# Patient Record
Sex: MALE | Race: AMERICAN INDIAN OR ALASKA NATIVE
[De-identification: names, ages, dates, MRNs, and addresses within clinical notes are randomized per-mention and may not be internally consistent; named-entity substitution may affect disease eponyms.]

---

## 2017-03-01 ENCOUNTER — HOSPITAL ENCOUNTER (INPATIENT)
Dept: HOSPITAL 5 - ED | Age: 57
LOS: 9 days | Discharge: HOME HEALTH | DRG: 286 | End: 2017-03-10
Attending: HOSPITALIST | Admitting: INTERNAL MEDICINE
Payer: MEDICARE

## 2017-03-01 DIAGNOSIS — Z96.653: ICD-10-CM

## 2017-03-01 DIAGNOSIS — R06.6: ICD-10-CM

## 2017-03-01 DIAGNOSIS — I47.2: Primary | ICD-10-CM

## 2017-03-01 DIAGNOSIS — I48.4: ICD-10-CM

## 2017-03-01 DIAGNOSIS — Z86.39: ICD-10-CM

## 2017-03-01 DIAGNOSIS — I25.10: ICD-10-CM

## 2017-03-01 DIAGNOSIS — I48.92: ICD-10-CM

## 2017-03-01 DIAGNOSIS — F17.210: ICD-10-CM

## 2017-03-01 DIAGNOSIS — R13.10: ICD-10-CM

## 2017-03-01 DIAGNOSIS — I42.0: ICD-10-CM

## 2017-03-01 DIAGNOSIS — I42.9: ICD-10-CM

## 2017-03-01 DIAGNOSIS — K21.9: ICD-10-CM

## 2017-03-01 DIAGNOSIS — G40.909: ICD-10-CM

## 2017-03-01 DIAGNOSIS — I10: ICD-10-CM

## 2017-03-01 DIAGNOSIS — I48.0: ICD-10-CM

## 2017-03-01 DIAGNOSIS — J45.909: ICD-10-CM

## 2017-03-01 DIAGNOSIS — I34.0: ICD-10-CM

## 2017-03-01 DIAGNOSIS — R57.0: ICD-10-CM

## 2017-03-01 LAB
ALBUMIN SERPL-MCNC: 4.2 G/DL (ref 3.9–5)
ALBUMIN/GLOB SERPL: 1.4 %
ALP SERPL-CCNC: 93 UNITS/L (ref 35–129)
ALT SERPL-CCNC: 22 UNITS/L (ref 7–56)
ANION GAP SERPL CALC-SCNC: 26 MMOL/L
APTT BLD: 28.9 SEC. (ref 24.2–36.6)
BASOPHILS NFR BLD AUTO: 0.7 % (ref 0–1.8)
BILIRUB SERPL-MCNC: 0.9 MG/DL (ref 0.1–1.2)
BUN SERPL-MCNC: 14 MG/DL (ref 9–20)
BUN/CREAT SERPL: 11.66 %
CALCIUM SERPL-MCNC: 8.6 MG/DL (ref 8.4–10.2)
CHLORIDE SERPL-SCNC: 100.1 MMOL/L (ref 98–107)
CK SERPL-CCNC: 393 UNITS/L (ref 55–170)
CO2 SERPL-SCNC: 21 MMOL/L (ref 22–30)
EOSINOPHIL NFR BLD AUTO: 0.4 % (ref 0–4.3)
GLUCOSE SERPL-MCNC: 92 MG/DL (ref 75–100)
HCT VFR BLD CALC: 38.2 % (ref 35.5–45.6)
HGB BLD-MCNC: 12.5 GM/DL (ref 11.8–15.2)
INR PPP: 1.04 (ref 0.87–1.13)
MCH RBC QN AUTO: 29 PG (ref 28–32)
MCHC RBC AUTO-ENTMCNC: 33 % (ref 32–34)
MCV RBC AUTO: 88 FL (ref 84–94)
PLATELET # BLD: 211 K/MM3 (ref 140–440)
POTASSIUM SERPL-SCNC: 4.1 MMOL/L (ref 3.6–5)
PROT SERPL-MCNC: 7.2 G/DL (ref 6.3–8.2)
RBC # BLD AUTO: 4.35 M/MM3 (ref 3.65–5.03)
SODIUM SERPL-SCNC: 143 MMOL/L (ref 137–145)
WBC # BLD AUTO: 7.4 K/MM3 (ref 4.5–11)

## 2017-03-01 PROCEDURE — 85027 COMPLETE CBC AUTOMATED: CPT

## 2017-03-01 PROCEDURE — 71010: CPT

## 2017-03-01 PROCEDURE — 93005 ELECTROCARDIOGRAM TRACING: CPT

## 2017-03-01 PROCEDURE — 96361 HYDRATE IV INFUSION ADD-ON: CPT

## 2017-03-01 PROCEDURE — 84484 ASSAY OF TROPONIN QUANT: CPT

## 2017-03-01 PROCEDURE — 83735 ASSAY OF MAGNESIUM: CPT

## 2017-03-01 PROCEDURE — 82553 CREATINE MB FRACTION: CPT

## 2017-03-01 PROCEDURE — C1892 INTRO/SHEATH,FIXED,PEEL-AWAY: HCPCS

## 2017-03-01 PROCEDURE — 36415 COLL VENOUS BLD VENIPUNCTURE: CPT

## 2017-03-01 PROCEDURE — 83880 ASSAY OF NATRIURETIC PEPTIDE: CPT

## 2017-03-01 PROCEDURE — 85025 COMPLETE CBC W/AUTO DIFF WBC: CPT

## 2017-03-01 PROCEDURE — 93306 TTE W/DOPPLER COMPLETE: CPT

## 2017-03-01 PROCEDURE — 80048 BASIC METABOLIC PNL TOTAL CA: CPT

## 2017-03-01 PROCEDURE — 93458 L HRT ARTERY/VENTRICLE ANGIO: CPT

## 2017-03-01 PROCEDURE — 93880 EXTRACRANIAL BILAT STUDY: CPT

## 2017-03-01 PROCEDURE — 88305 TISSUE EXAM BY PATHOLOGIST: CPT

## 2017-03-01 PROCEDURE — C1895 LEAD, AICD, ENDO DUAL COIL: HCPCS

## 2017-03-01 PROCEDURE — 33249 INSJ/RPLCMT DEFIB W/LEAD(S): CPT

## 2017-03-01 PROCEDURE — 85730 THROMBOPLASTIN TIME PARTIAL: CPT

## 2017-03-01 PROCEDURE — C1726 CATH, BAL DIL, NON-VASCULAR: HCPCS

## 2017-03-01 PROCEDURE — C1722 AICD, SINGLE CHAMBER: HCPCS

## 2017-03-01 PROCEDURE — 93010 ELECTROCARDIOGRAM REPORT: CPT

## 2017-03-01 PROCEDURE — C1894 INTRO/SHEATH, NON-LASER: HCPCS

## 2017-03-01 PROCEDURE — 83036 HEMOGLOBIN GLYCOSYLATED A1C: CPT

## 2017-03-01 PROCEDURE — 80053 COMPREHEN METABOLIC PANEL: CPT

## 2017-03-01 PROCEDURE — 85610 PROTHROMBIN TIME: CPT

## 2017-03-01 PROCEDURE — 96374 THER/PROPH/DIAG INJ IV PUSH: CPT

## 2017-03-01 PROCEDURE — 96375 TX/PRO/DX INJ NEW DRUG ADDON: CPT

## 2017-03-01 PROCEDURE — 82550 ASSAY OF CK (CPK): CPT

## 2017-03-01 RX ADMIN — APIXABAN SCH MG: 5 TABLET, FILM COATED ORAL at 23:00

## 2017-03-01 NOTE — ADMIT CRITERIA FORM
Admission Criteria Documentation: 





                            CARDIOLOGY GRG





Clinical Indications for Admission to Inpatient Care


    


                                                                               (

Place 'X' for any and all applicable criteria): 





Hospital admission is needed for appropriate care of the patient because of ANY 

ONE of the following (1): 





[ ] I.    Hemodynamic instability as indicated by ALL of the following (1)(2)(3)

(4)(5)


         [ ]a)   Vital signs or other findings not as expected for chronic 

patient condition or baseline


         [ ]b)   Instability indicated by ANY ONE of the following:


                  [ ]i)     Hypotension


                  [ ]ii)    Symptomatic Tachycardia unresponsive to treatment (

e.g., analgesia, fluids, sedation as indicated)


                  [ ]iii)   Inadequate perfusion indicated by ANY ONE of the 

following:


                            [ ] 1)   Lactic acidosis (> 2 mmol/L)


                            [ ] 2)   New abnormal capillary refill (> 3 seconds)


                            [ ] 3)   Reduced urine output


                            [ ] 4)   New altered mental status


                  [ ]iv)   Orthostatic vital sign changes unresponsive to 

treatment (e.g., fluids)              


                  [ ]v)    IV inotropic or vasopressor medication required to 

maintain adequate blood pressure or perfusion


[ ] II.  Severe heart failure as indicated by ANY ONE of the following(17)(18)


         [ ]a)  Respiratory distress        


         [ ]b)  Hypotension


         [ ]c)  Anasarca (refractory to outpatient therapy) 


         [ ]d)  Cardiac arrhythmias of immediate concern 


         [ ]e)  Myocardial ischemia


[ ] III. Cardiac arrhythmias or findings of immediate concern indicated by ANY 

ONE of the following (19)(20):


         [ ] a)  Heart rhythms that are inherently dangerous or unstable 

indicated by ANY ONE of the following (21)(22)(23):


                 [ ] i)    Resuscitated ventricular fibrillation or cardiac 

arrest


                 [ ] ii)   Ventricular escape rhythm


                 [ ] iii)  Sustained ventricular tachycardia (30 seconds or 

more of ventricular rhythm at greater than 100 beats per minute)


                 [ ] iv)  Nonsustained ventricular tachycardia and ANY ONE of 

the following:


                          [ ] 1)    Suspected cardiac ischemia as cause or 

consequence of ventricular tachycardia    


                          [ ] 2)    In setting of acute myocarditis         


         [ ] b)  Unstable cardiac conduction defects indicated by ANY ONE of 

the following(23)(24)(25)


                  [ ] i)    Type II second-degree atrioventricular block    


                  [ ]ii)    Third-degree atrioventricular block                


                  [ ]iii)    New-onset left bundle branch block with suspected 

myocardial ischemia


         [ ]c)   Any heart rhythm and ANY ONE of the following (21)(22)(26)(27) 

(28)


                  [ ] i)    Continuous long-term ECG monitoring needed (e.g., 

initiation of drug requiring monitoring for more than 24 hours)


                  [ ] ii)   Patient has automatic implanted cardioverter 

defibrillator that is repeatedly firing, malfunctioning, or in need of 

immediate 


                            adjustment of settings beyond the scope of 

ambulatory or observation care


        [ ]d)    Heart rhythms of concern due to ANY ONE of the following:


                  [ ] i)    Hypotension


                  [ ] ii)    Respiratory distress


                  [ ] iii)   Association with other significant symptoms (e.g., 

bradycardia with syncope or ongoing dizziness, supraventricular 


                            tachycardia with chest pain (14)(15)(17)


[ ] IV.   Monitoring for cardiac contusion beyond the scope of observation care 

needed [A](30)(31)(32)


[ ] V.    Surgical or device complication (e.g., valve replacement complication

, pacemaker dysfunction) (35)(41)(44)(45)(46)


[ ] VI.   Inpatient palliative care needed. [B](49) Also use Inpatient 

Palliative Care Criteria


[ ] VII.  Nonbacterial thrombotic (marantic) endocarditis (36)(43)(47)(48)


[ X] VIII. Cardiology condition, symptom, or finding for which emergency and 

observation care has failed or are not considered appropriate.


[ ] IX.   Acute valvular disease requiring inpatient as indicated by ANY ONE of 

the following (41)


          [ ]a)   Acute valvular regurgitation (42)


          [ ]b)   Noninfectious valvulitis (43)


          [ ]c)   Obstructive valve thrombosis 


          [ ]d)   Paravalvular leak


          [ ]e)   Other significant valvular disorder remaining after emergency 

or observation level of care (as appropriate)


[ ]X.    Pericardial disease requiring inpatient treatment as indicated by ANY 

ONE of the following (33)(34)(35)(36)(37)           


          [ ]a)   Suspected tamponade (38)(39)(40)


          [ ]b)   Hemopericardium


          [ ]c)   Other significant pericardial disorder remaining after 

emergency or observation level of care (as appropriate)


[ ] XI.  Cardiac ischemia beyond scope of emergency and observation care. 


[ ] XII. Hypertension requiring inpatient treatment as indicated by ANY ONE of 

the following (6)(7)(8)


         [ ]a)    SBP greater than 220 mm Hg or DBP greater than 120 mmHg 

despite treatment


         [ ]b)    SBP greater than 140 mm Hg or DBP greater than 100 mm Hg with 

evidence of acute end organ damage as indicated


                   by ANY ONE of the following


                   [ ] i)      Altered mental status


                   [ ] ii)     Acute renal failure as indicated by new onset of 

ANY ONE of the following (9)(10)(11)(12)(13)


                               [ ]1)  3-fold rise in serum creatinine from 

baseline


                               [ ]2)  Serum creatinine greater than 4 mg/dL (

354 micromoles/L) with acute rise greater than 0.5 mg/dL (44.2 micromoles/L)


                               [ ]3)  Reduction of more than 75% in estimated 

glomerular filtration rate from baseline 


                               [ ]4)  Estimated glomerular filtration rate less 

than 35 mL/min/1.73m2 (0.59 mL/sec/1.73m2) in child up to 18 years of age


                               [ ]5)  Cessation of urine output indicated by 

ALL of the following


                                       [ ]A.   Adequate volume status


                                       [ ]B.   Inadequate urine output as 

indicated by ANY ONE of the following       


                                                [ ]a.   Urine output less than 

0.3 mL/kg/hr for 24 hours


                                                [ ]b.   Anuria (urine output 

less than 0.1 mL/kg/hr) for 12 hours 


                  [ ] iii)      Aortic dissection


                  [ ] iv)      Myocardial Ischemia


                  [ ] v)       Left ventricular heart failure 


                  [ ]vi)       Retinal Hemorrhage


                  [ ]vii)      Other significant finding


          [ ]c)   Hypertension in child requiring inpatient treatment as 

indicated by ALL of the following(14)(15)(16)


                  [ ] i)        Outpatient treatment not effective, not 

available, or not appropriate               


                  [ ]ii)        SBP or DBP greater than 95th percentile for age


                  [ ]iii)       Evidence of acute end organ damage as indicated 

by ANY ONE of the following 


                               [ ]1)     Altered mental status


                               [ ]2)    Acute renal failure as indicated by new 

onset of ANY ONE of the following(9)(10)(11)(12)(13)


                                         [ ]A.    3-fold rise in serum 

creatinine from baseline


                                         [ ]B.    Serum creatinine greater than 

4 mg/dL (354 micromoles/L) with acute rise greater than 0.5 mg/dL (44.2 

micromoles/L)


                                         [ ]C.    Reduction of more than 75% in 

estimated glomerular filtration rate from baseline


                                         [ ]D.    Estimated glomerular 

filtration rate less than 35 mL/min/1.73m2 (0.59 mL/sec/1.73m2) in child up to 

18 years of age 


                                         [ ]E.    Cessation of urine output 

indicated by ALL of the following 


                                                   [ ]a.  Adequate volume status


                                                   [ ]b.  Inadequate urine 

output as indicated by ANY ONE of the following 


                                                           [ ]i)    Urine 

output less than 0.3 mL/kg/hr for 24 hours


                                                           [ ]ii)   Anuria (

urine output less than 0.1 mL/kg/hr) for 12 hours                              

  


                               [ ]3)  Severe headache


                               [ ]4)  Visual disturbance 


                               [ ]5)  Retinal hemorrhage


                               [ ]6)  Other significant finding


[ ]XIII.   Complications of transplanted heart indicated by ANY ONE of the 

following(61):


           [ ]a)  Acute graft rejection requiring inpatient management (eg, 

intravenous immunosuppression)(62)(63)


           [ ]b)  Acute graft heart failure indicated by ANY ONE of the 

following(64):


                   [ ]i)   Hemodynamic instability


                   [ ]ii)  Cardiac arrhythmias of immediate concern


                   [ ]iii) Pulmonary edema that is very severe (eg, mechanical 

ventilation needed,


                          imminent or likely, need for 100% oxygen to keep 

oxygen saturation above 90%)


                   [ ]iv) Pulmonary edema that is persistent as indicated by ALL

 of the following:


                          [ ]1)   New need for oxygen therapy to keep oxygen 

saturation above 90% (or increased FiO2 need from baseline)


                          [ ]2)   Has not improved sufficiently with emergency 

department or observation


                                   care IV diuretics or other heart failure 

treatments[E]


                   [ ]v)   Altered mental status that is severe or persistent


                   [ ]vi)   Increased creatinine (new on laboratory test) with 

reduction of more than 50% in


                            estimated glomerular filtration rate from baseline


                   [ ]vii)  Progressively (ongoing) rising creatinine (known 

from past laboratory test) with


                            reduction of more than 25% in estimated glomerular 

filtration rate from baseline


                   [ ]viii) Acute renal failure


                   [ ]ix)  Acute peripheral ischemia (eg, examination shows 

pulseless, cool, mottled, or cyanotic extremity)


                   [ ]x)   Pulmonary artery catheter monitoring needed


                   [ ]xi)  Other sign or symptom of heart failure requiring 

inpatient treatment (ie, too severe or


                            not responsive to outpatient and observation care 

treatment)


        [ ]c)    Infection requiring inpatient management (eg, Hemodynamic 

instability, need for intravenous antimicrobial treatment)(66)(67)(68)(69)(70)


        [ ]d)   Cardiac allograft vasculopathy requiring inpatient management (

eg evidence of cardiac ischemia)(71)


        [ ]e)   Other complication of transplanted heart (eg, stroke, severe 

pulmonary hypertension, severe valvular 


                 dysfunction) requiring inpatient management(72)








The original [facility] [employer] content created by [facility] [employer] has been revised. 


The portions of the content which have been revised are identified through the 

use of italic text or in bold, and [facility] 


has neither reviewed nor approved the modified material. All other unmodified 

content is copyright  [facility] [employer].





Please see references footnoted in the original [facility] [employer] edition 

2016





Admission Criteria Met: Yes

## 2017-03-01 NOTE — EMERGENCY DEPARTMENT REPORT
HPI





- General


Chief Complaint: Chest Pain


Time Seen by Provider: 03/01/17 17:49





- HPI


HPI: 





The patient is a 56-year-old male with a history of SVT, presents for 

evaluation of chest pain.  The patient reports similar onset of chest pain past 

one hour prior to arrival.  He states that his chest pain and midsternal in 

location, throbbing in quality, 10/10 in severity, constant since onset.  He 

shares a experienced an episode of syncope followed by palpitations and 

diaphoresis just prior to onset of chest pain.  The patient denies fever, cough

, dyspnea, hemoptysis, unilateral leg swelling, recent immobilization, history 

of DVT or PE, recent cancer.





ED Past Medical Hx





- Past Medical History


Previous Medical History?: Yes


Hx Hypertension: Yes


Hx Congestive Heart Failure: No


Hx Diabetes: No


Hx Asthma: No


Hx COPD: No





- Surgical History


Past Surgical History?: Yes


Additional Surgical History: JAW, KNEE, HAND





- Social History


Smoking Status: Current Every Day Smoker


Substance Use Type: Alcohol





- Medications


Home Medications: 


 Home Medications











 Medication  Instructions  Recorded  Confirmed  Last Taken  Type


 


Ibuprofen [Motrin 800 MG tab] 800 mg PO Q8HR PRN #30 tablet 05/12/16 08/14/16 2 

Days Ago Rx


 


Metoprolol [Lopressor] 0 mg PO BID 08/15/16 08/15/16 Unknown History


 


Apixaban [Eliquis] 5 mg PO Q12HR #14 tablet 08/19/16  Unknown Rx


 


Lisinopril [Zestril TAB] 5 mg PO QDAY #30 tablet 08/19/16  Unknown Rx


 


Metoprolol [Lopressor TAB] 75 mg PO Q8H #30 tablet 08/19/16  Unknown Rx


 


Pantoprazole [Protonix TAB] 0 mg PO QDAY #30 tablet. 08/19/16  Unknown Rx














ED Review of Systems


ROS: 


Stated complaint: CHEST PAIN


Other details as noted in HPI








Constitutional: denies: fever


ENT: denies: throat or neck pain


Respiratory: denies: cough, shortness of breath


Cardiovascular: reports chest pain


Endocrine: denies unexplained weight loss or gain


Gastrointestinal: denies: abdominal pain, nausea


Genitourinary: denies: dysuria


Musculoskeletal: denies: leg swelling


Skin: denies: rash


Neurological: denies: headache


Hematological/Lymphatic: denies: easy bleeding or easy bruising  


Psych: denies sadness or hopelessness








Physical Exam





- Physical Exam


Vital Signs: 


 Vital Signs











  03/01/17 03/01/17 03/01/17





  17:30 17:36 17:37


 


Pulse Rate 216 H 217 H 


 


Respiratory 22 24 17





Rate   


 


Blood Pressure   


 


O2 Sat by Pulse 100 100 100





Oximetry   














  03/01/17 03/01/17





  17:41 17:51


 


Pulse Rate 91 H 92 H


 


Respiratory 14 





Rate  


 


Blood Pressure 130/94 


 


O2 Sat by Pulse 100 





Oximetry  











Physical Exam: 








General: well-nourished, well-developed, no acute distress


Head: Normocephalic, atraumatic


Eyes: normal sclera


ENT: Mucous membranes are pale and dry


Neck: trachea midline, neck supple, No neck stiffness, no cervical adenopathy


Respiratory: Breath sounds equal bilaterally, no wheezing, rales, or rhonchi


Cardio: S1 and S2 present, no murmurs, rubs, gallops, capillary refill is 

delayed 


Abdomen: Normoactive bowel sounds, soft abdomen, no rigidity, no guarding or 

rebound tenderness


Musc: No pitting edema


Skin: patient diaphoretic


Neuro: Alert and oriented 3, no facial drooping, normal speech


Psych: Normal affect








ED Course


 Vital Signs











  03/01/17 03/01/17 03/01/17





  17:30 17:36 17:37


 


Pulse Rate 216 H 217 H 


 


Respiratory 22 24 17





Rate   


 


Blood Pressure   


 


O2 Sat by Pulse 100 100 100





Oximetry   














  03/01/17 03/01/17





  17:41 17:51


 


Pulse Rate 91 H 92 H


 


Respiratory 14 





Rate  


 


Blood Pressure 130/94 


 


O2 Sat by Pulse 100 





Oximetry  














ED Medical Decision Making





- Lab Data


Result diagrams: 


 03/01/17 17:30





 03/01/17 17:30





- Medical Decision Making





The patient was seen and examined by myself.  The patient is placed on a 

cardiac monitor and continuous pulse ox. On initial evaluation, the patient was 

found to be in no distress. Evaluation orders were placed.  Initial EKG 

exhibits wide complex QRS tachycardia, HR in 200s, consistent with polymorphic 

ventricular tachycardia.  The patient's blood pressure is unable to be obtained 

and the patient meets criteria for unstable ventricular tachycardia.  The 

patient is given IV Versed. The patient is cardioverted with 200 J.  A repeat 

EKG is obtained and now exhibits normal sinus rhythm.  Chest x-ray is 

unremarkable.  Lab results are grossly unrevealing. The on-call hospitalist 

service was contacted.  They agreed to admit the patient for further treatment 

and close monitoring.  The ED admit order was placed.  The patient was admitted 

in guarded condition.





Critical care attestation.: 


If time is entered above; I have spent that time in minutes in the direct care 

of this critically ill patient, excluding procedure time.








ED Disposition


Clinical Impression: 


 Ventricular tachycardia, monomorphic, Cardiogenic shock, Chest pain in adult





Disposition: OP ADMITTED AS IP TO THIS HOSP


Is pt being admited?: Yes


Does the pt Need Aspirin: Yes


Condition: Serious


Instructions:  Chest Pain (ED)


Time of Disposition: 17:57

## 2017-03-02 LAB
ALBUMIN SERPL-MCNC: 3.8 G/DL (ref 3.9–5)
ALBUMIN/GLOB SERPL: 1.7 %
ALP SERPL-CCNC: 78 UNITS/L (ref 35–129)
ALT SERPL-CCNC: 18 UNITS/L (ref 7–56)
ANION GAP SERPL CALC-SCNC: 20 MMOL/L
BASOPHILS NFR BLD AUTO: 0.9 % (ref 0–1.8)
BILIRUB SERPL-MCNC: 0.8 MG/DL (ref 0.1–1.2)
BUN SERPL-MCNC: 14 MG/DL (ref 9–20)
BUN/CREAT SERPL: 14 %
CALCIUM SERPL-MCNC: 8 MG/DL (ref 8.4–10.2)
CHLORIDE SERPL-SCNC: 102.9 MMOL/L (ref 98–107)
CK SERPL-CCNC: 343 UNITS/L (ref 55–170)
CO2 SERPL-SCNC: 26 MMOL/L (ref 22–30)
EOSINOPHIL NFR BLD AUTO: 1 % (ref 0–4.3)
GLUCOSE SERPL-MCNC: 88 MG/DL (ref 75–100)
HCT VFR BLD CALC: 34.7 % (ref 35.5–45.6)
HGB BLD-MCNC: 11.2 GM/DL (ref 11.8–15.2)
MCH RBC QN AUTO: 29 PG (ref 28–32)
MCHC RBC AUTO-ENTMCNC: 32 % (ref 32–34)
MCV RBC AUTO: 90 FL (ref 84–94)
PLATELET # BLD: 156 K/MM3 (ref 140–440)
POTASSIUM SERPL-SCNC: 4.5 MMOL/L (ref 3.6–5)
PROT SERPL-MCNC: 6.1 G/DL (ref 6.3–8.2)
RBC # BLD AUTO: 3.83 M/MM3 (ref 3.65–5.03)
SODIUM SERPL-SCNC: 144 MMOL/L (ref 137–145)
WBC # BLD AUTO: 4.8 K/MM3 (ref 4.5–11)

## 2017-03-02 PROCEDURE — 5A2204Z RESTORATION OF CARDIAC RHYTHM, SINGLE: ICD-10-PCS | Performed by: INTERNAL MEDICINE

## 2017-03-02 RX ADMIN — METOPROLOL TARTRATE SCH MG: 50 TABLET, FILM COATED ORAL at 00:00

## 2017-03-02 RX ADMIN — ACETAMINOPHEN PRN MG: 325 TABLET ORAL at 00:45

## 2017-03-02 RX ADMIN — ZOLPIDEM TARTRATE PRN MG: 5 TABLET ORAL at 21:04

## 2017-03-02 RX ADMIN — DEXTROSE AND SODIUM CHLORIDE SCH MLS/HR: 5; .9 INJECTION, SOLUTION INTRAVENOUS at 17:18

## 2017-03-02 RX ADMIN — HYDROMORPHONE HYDROCHLORIDE PRN MG: 1 INJECTION, SOLUTION INTRAMUSCULAR; INTRAVENOUS; SUBCUTANEOUS at 21:04

## 2017-03-02 RX ADMIN — OXYCODONE AND ACETAMINOPHEN PRN TAB: 5; 325 TABLET ORAL at 23:35

## 2017-03-02 RX ADMIN — METOPROLOL TARTRATE SCH MG: 50 TABLET, FILM COATED ORAL at 23:35

## 2017-03-02 RX ADMIN — PANTOPRAZOLE SODIUM SCH MG: 40 TABLET, DELAYED RELEASE ORAL at 10:12

## 2017-03-02 RX ADMIN — DEXTROSE AND SODIUM CHLORIDE SCH MLS/HR: 5; .9 INJECTION, SOLUTION INTRAVENOUS at 02:03

## 2017-03-02 RX ADMIN — IBUPROFEN PRN MG: 800 TABLET, FILM COATED ORAL at 17:17

## 2017-03-02 RX ADMIN — OXYCODONE AND ACETAMINOPHEN PRN TAB: 5; 325 TABLET ORAL at 08:12

## 2017-03-02 RX ADMIN — LISINOPRIL SCH MG: 5 TABLET ORAL at 10:12

## 2017-03-02 RX ADMIN — APIXABAN SCH MG: 5 TABLET, FILM COATED ORAL at 10:12

## 2017-03-02 RX ADMIN — METOPROLOL TARTRATE SCH MG: 50 TABLET, FILM COATED ORAL at 08:12

## 2017-03-02 RX ADMIN — AMIODARONE HYDROCHLORIDE SCH MG: 200 TABLET ORAL at 21:03

## 2017-03-02 RX ADMIN — METOPROLOL TARTRATE SCH MG: 50 TABLET, FILM COATED ORAL at 17:17

## 2017-03-02 RX ADMIN — ZOLPIDEM TARTRATE PRN MG: 5 TABLET ORAL at 01:30

## 2017-03-02 RX ADMIN — ONDANSETRON PRN MG: 2 INJECTION INTRAMUSCULAR; INTRAVENOUS at 21:04

## 2017-03-02 NOTE — PROGRESS NOTE
Assessment and Plan


Assessment and plan: 


1. Wide complex tachycardia- PAF with dilated CMP -cardioverted with 200J;  

cardioversion; cotn eliquis; no ischemia by MPI 8/2016; EF 15-20% on echo 8/2016

; for ECHO; cardiology consult appreciated; for Lt heart cath in the a.m; EP 

consult with Dr. Kiara arrieta 


2. Hypertension- cotn metoprolol


3. DVT prophylaxis- on eliquis





History


Interval history: 


F/u wide complex tachycardia' syncope chest pain


PAtient seen at the bedside; was cardioverted with 200J in the ER; c/p chest 

pain   





Hospitalist Physical





- Constitutional


Vitals: 


 











Temp Pulse Resp BP Pulse Ox


 


 98.1 F   76   18   147/87   95 


 


 03/02/17 09:45  03/02/17 09:45  03/02/17 09:45  03/02/17 09:45  03/02/17 09:45











General appearance: Present: no acute distress, well-nourished





- EENT


Eyes: Present: PERRL.  Absent: scleral icterus, conjunctival injection


ENT: hearing intact, clear oral mucosa





- Neck


Neck: Present: supple, normal ROM.  Absent: enlarged thyroid, masses or JVD





- Respiratory


Respiratory effort: normal


Respiratory: negative: diminished, rales, rhonchi, wheezing





- Cardiovascular


Rhythm: regular


Heart Sounds: Present: S1 & S2.  Absent: gallop





- Extremities


Extremities: no ischemia, pulses intact, pulses symmetrical, No edema


Peripheral Pulses: within normal limits





- Abdominal


General gastrointestinal: soft, non-tender, non-distended





- Integumentary


Integumentary: Present: clear





- Psychiatric


Psychiatric: appropriate mood/affect, intact judgment & insight





- Neurologic


Neurologic: CNII-XII intact





Results





- Labs


CBC & Chem 7: 


 03/02/17 05:21





 03/02/17 05:21


Labs: 


 Laboratory Last Values











WBC  4.8 K/mm3 (4.5-11.0)   03/02/17  05:21    


 


RBC  3.83 M/mm3 (3.65-5.03)   03/02/17  05:21    


 


Hgb  11.2 gm/dl (11.8-15.2)  L  03/02/17  05:21    


 


Hct  34.7 % (35.5-45.6)  L  03/02/17  05:21    


 


MCV  90 fl (84-94)   03/02/17  05:21    


 


MCH  29 pg (28-32)   03/02/17  05:21    


 


MCHC  32 % (32-34)   03/02/17  05:21    


 


RDW  16.6 % (13.2-15.2)  H  03/02/17  05:21    


 


Plt Count  156 K/mm3 (140-440)   03/02/17  05:21    


 


Lymph % (Auto)  22.8 % (13.4-35.0)   03/02/17  05:21    


 


Mono % (Auto)  8.4 % (0.0-7.3)  H  03/02/17  05:21    


 


Eos % (Auto)  1.0 % (0.0-4.3)   03/02/17  05:21    


 


Baso % (Auto)  0.9 % (0.0-1.8)   03/02/17  05:21    


 


Lymph #  1.1 K/mm3 (1.2-5.4)  L  03/02/17  05:21    


 


Mono #  0.4 K/mm3 (0.0-0.8)   03/02/17  05:21    


 


Eos #  0.0 K/mm3 (0.0-0.4)   03/02/17  05:21    


 


Baso #  0.0 K/mm3 (0.0-0.1)   03/02/17  05:21    


 


Seg Neutrophils %  66.9 % (40.0-70.0)   03/02/17  05:21    


 


Seg Neutrophils #  3.2 K/mm3 (1.8-7.7)   03/02/17  05:21    


 


PT  13.5 Sec. (12.2-14.9)   03/01/17  17:30    


 


INR  1.04  (0.87-1.13)   03/01/17  17:30    


 


APTT  28.9 Sec. (24.2-36.6)   03/01/17  17:30    


 


Sodium  144 mmol/L (137-145)   03/02/17  05:21    


 


Potassium  4.5 mmol/L (3.6-5.0)   03/02/17  05:21    


 


Chloride  102.9 mmol/L ()   03/02/17  05:21    


 


Carbon Dioxide  26 mmol/L (22-30)   03/02/17  05:21    


 


Anion Gap  20 mmol/L  03/02/17  05:21    


 


BUN  14 mg/dL (9-20)   03/02/17  05:21    


 


Creatinine  1.0 mg/dL (0.8-1.5)   03/02/17  05:21    


 


Estimated GFR  > 60 ml/min  03/02/17  05:21    


 


BUN/Creatinine Ratio  14.00 %  03/02/17  05:21    


 


Glucose  88 mg/dL ()   03/02/17  05:21    


 


Hemoglobin A1c  5.6 % (4-6)   03/01/17  22:35    


 


Calcium  8.0 mg/dL (8.4-10.2)  L  03/02/17  05:21    


 


Magnesium  1.7 mg/dL (1.7-2.3)   03/01/17  17:30    


 


Total Bilirubin  0.8 mg/dL (0.1-1.2)   03/02/17  05:21    


 


AST  31 units/L (5-40)   03/02/17  05:21    


 


ALT  18 units/L (7-56)   03/02/17  05:21    


 


Alkaline Phosphatase  78 units/L ()   03/02/17  05:21    


 


Total Creatine Kinase  343 units/L ()  H  03/02/17  05:21    


 


CK-MB (CK-2)  4.7 ng/mL (0.0-4.0)  H  03/02/17  05:21    


 


CK-MB (CK-2) Rel Index  1.3  (0-4)   03/02/17  05:21    


 


Troponin T  < 0.010 ng/mL (0.00-0.029)   03/02/17  05:21    


 


NT-Pro-B Natriuret Pep  308.9 pg/mL (0-900)   03/01/17  17:30    


 


Total Protein  6.1 g/dL (6.3-8.2)  L  03/02/17  05:21    


 


Albumin  3.8 g/dL (3.9-5)  L  03/02/17  05:21    


 


Albumin/Globulin Ratio  1.7 %  03/02/17  05:21

## 2017-03-02 NOTE — CONSULTATION
<ANNA GOMEZ - Last Filed: 03/02/17 14:17>





History of Present Illness


Consult date: 03/02/17


Consult reason: other (Ventricular tachycardia)


History of present illness: 





This is a 56yr old male who reports helping a neighbor when he developed chest 

pain, palpitations, diaphoresis and dizziness. EMS was called and noted a wide 

complex tachycardia on initial ECG. He was given 12mg of adenosine and brought 

to this hospital. Upon arrival patient remained in a wide complex tachycardia 

with heart rate in the 200s. Patient was considered unstable and was 

cardioverted with 200J. An ECG post cardioversion shows a normal sinus rhythm.





Patient reports a cardiac history of dilated cardiomyopathy and paroxysmal 

atrial flutter. He reports taking eliquis for anticoagulation. Patient unable 

to articulate if he has a history of coronary disease but does recall 

undergoing a cardiac cath several years ago at a hospital in [place]. His latest 

cardiac workup was done at this hospital within the last year. He had a 

persantine thallium stress test that demonstrated a normal myocardial 

perfusion. No ischemia. An echocardiogram reported an ejection fraction 15-20%. 

Patient reports he is followed by a cardiologist [facility]. 





Medications and Allergies


 Allergies











Allergy/AdvReac Type Severity Reaction Status Date / Time


 


No Known Allergies Allergy   Unverified 05/12/16 18:30











 Home Medications











 Medication  Instructions  Recorded  Confirmed  Last Taken  Type


 


Ibuprofen [Motrin 800 MG tab] 800 mg PO Q8HR PRN #30 tablet 05/12/16 08/14/16 2 

Days Ago Rx


 


Metoprolol [Lopressor] 0 mg PO BID 08/15/16 08/15/16 Unknown History


 


Apixaban [Eliquis] 5 mg PO Q12HR #14 tablet 08/19/16  Unknown Rx


 


Lisinopril [Zestril TAB] 5 mg PO QDAY #30 tablet 08/19/16  Unknown Rx


 


Metoprolol [Lopressor TAB] 75 mg PO Q8H #30 tablet 08/19/16  Unknown Rx


 


Pantoprazole [Protonix TAB] 0 mg PO QDAY #30 tablet.dr 08/19/16  Unknown Rx











Active Meds: 


Active Medications





Acetaminophen (Tylenol)  650 mg PO Q4H PRN


   PRN Reason: Pain MILD(1-3)/Fever >100.5/HA


   Last Admin: 03/02/17 00:45 Dose:  650 mg


Apixaban (Eliquis)  5 mg PO Q12HR [facility]


   Last Admin: 03/02/17 10:12 Dose:  5 mg


Bisacodyl (Dulcolax)  10 mg SD QDAY PRN


   PRN Reason: Constipation unrelieved by MOM


Enoxaparin Sodium (Lovenox)  40 mg SUB-Q QDAY [facility]


   Last Admin: 03/02/17 10:13 Dose:  40 mg


Hydromorphone HCl (Dilaudid)  0.5 mg IV Q3H PRN


   PRN Reason: Pain , Severe (7-10)


Dextrose/Sodium Chloride (D5ns)  1,000 mls @ 75 mls/hr IV AS DIRECT [facility]


   Last Admin: 03/02/17 02:03 Dose:  75 mls/hr


Ibuprofen (Motrin)  800 mg PO Q8H PRN


   PRN Reason: Pain


Lisinopril (Zestril)  5 mg PO QDAY [facility]


   Last Admin: 03/02/17 10:12 Dose:  5 mg


Magnesium Hydroxide (Milk Of Magnesia)  30 ml PO Q4H PRN


   PRN Reason: Constipation


Metoprolol Tartrate (Lopressor)  75 mg PO Q8H [facility]


   Last Admin: 03/02/17 08:12 Dose:  75 mg


Ondansetron HCl (Zofran)  4 mg IV Q8H PRN


   PRN Reason: N/V unrelieved by Reglan


Oxycodone/Acetaminophen (Percocet 5/325)  1 tab PO Q6H PRN


   PRN Reason: Pain, Moderate (4-6)


   Last Admin: 03/02/17 08:12 Dose:  1 tab


Pantoprazole Sodium (Protonix)  40 mg PO QDAY [facility]


   Last Admin: 03/02/17 10:12 Dose:  40 mg


Sodium Chloride (Sodium Chloride Flush Syringe 10 Ml)  10 ml IV PRN PRN


   PRN Reason: LINE FLUSH


Zolpidem Tartrate (Ambien)  5 mg PO QHS PRN


   PRN Reason: Insomnia


   Last Admin: 03/02/17 01:30 Dose:  5 mg











Physical Examination


 Vital Signs











Pulse Resp Pulse Ox


 


 216 H  22   100 


 


 03/01/17 17:30  03/01/17 17:30  03/01/17 17:30











General appearance: no acute distress


HEENT: Positive: PERRL


Neck: Positive: trachea midline


Cardiac: Positive: Reg Rate and Rhythm


Lungs: Positive: Decreased Breath Sounds





Results





 03/02/17 05:21





 03/02/17 05:21


 Cardiac Enzymes











  03/01/17 03/02/17 03/02/17 Range/Units





  22:35 05:21 05:21 


 


AST   31   (5-40)  units/L


 


CK-MB (CK-2)  5.6 H   4.7 H  (0.0-4.0)  ng/mL








 CBC











  03/02/17 Range/Units





  05:21 


 


WBC  4.8  (4.5-11.0)  K/mm3


 


RBC  3.83  (3.65-5.03)  M/mm3


 


Hgb  11.2 L  (11.8-15.2)  gm/dl


 


Hct  34.7 L  (35.5-45.6)  %


 


Plt Count  156  (140-440)  K/mm3


 


Lymph #  1.1 L  (1.2-5.4)  K/mm3


 


Mono #  0.4  (0.0-0.8)  K/mm3


 


Eos #  0.0  (0.0-0.4)  K/mm3


 


Baso #  0.0  (0.0-0.1)  K/mm3








 Comprehensive Metabolic Panel











  03/02/17 Range/Units





  05:21 


 


Sodium  144  (137-145)  mmol/L


 


Potassium  4.5  (3.6-5.0)  mmol/L


 


Chloride  102.9  ()  mmol/L


 


Carbon Dioxide  26  (22-30)  mmol/L


 


BUN  14  (9-20)  mg/dL


 


Creatinine  1.0  (0.8-1.5)  mg/dL


 


Glucose  88  ()  mg/dL


 


Calcium  8.0 L  (8.4-10.2)  mg/dL


 


AST  31  (5-40)  units/L


 


ALT  18  (7-56)  units/L


 


Alkaline Phosphatase  78  ()  units/L


 


Total Protein  6.1 L  (6.3-8.2)  g/dL


 


Albumin  3.8 L  (3.9-5)  g/dL














EKG interpretations





- Telemetry


EKG Rhythm: Sinus Rhythm





Assessment and Plan





Wide complex tachycardia


   terminated by 200J cardioversion


Hx of paroxysmal atrial flutter


   on eliquis for anticoagulation


Hx of dilated cardiomyopathy


   no ischemia by MPI 8/2016


   EF 15-20% on echo 8/2016


Hypertension


Hx of Seizure disorder





Plan:


Echocardiogram for LVEF assessment.


Continue metoprolol.


Will proceed with a left cardiac cath tomorrow morning. 


EP consultation with Dr Craig, for AICD evaluation.





<ADELINE CRAIG - Last Filed: 03/02/17 17:23>





Medications and Allergies


Active Meds: 


Active Medications





Acetaminophen (Tylenol)  650 mg PO Q4H PRN


   PRN Reason: Pain MILD(1-3)/Fever >100.5/HA


   Last Admin: 03/02/17 00:45 Dose:  650 mg


Apixaban (Eliquis)  5 mg PO Q12HR [facility]


   Last Admin: 03/02/17 10:12 Dose:  5 mg


Bisacodyl (Dulcolax)  10 mg SD QDAY PRN


   PRN Reason: Constipation unrelieved by MOM


Hydromorphone HCl (Dilaudid)  0.5 mg IV Q3H PRN


   PRN Reason: Pain , Severe (7-10)


Dextrose/Sodium Chloride (D5ns)  1,000 mls @ 75 mls/hr IV AS DIRECT [facility]


   Last Admin: 03/02/17 02:03 Dose:  75 mls/hr


Ibuprofen (Motrin)  800 mg PO Q8H PRN


   PRN Reason: Pain


Lisinopril (Zestril)  5 mg PO QDAY [facility]


   Last Admin: 03/02/17 10:12 Dose:  5 mg


Magnesium Hydroxide (Milk Of Magnesia)  30 ml PO Q4H PRN


   PRN Reason: Constipation


Metoprolol Tartrate (Lopressor)  75 mg PO Q8H [facility]


   Last Admin: 03/02/17 08:12 Dose:  75 mg


Ondansetron HCl (Zofran)  4 mg IV Q8H PRN


   PRN Reason: N/V unrelieved by Reglan


Oxycodone/Acetaminophen (Percocet 5/325)  1 tab PO Q6H PRN


   PRN Reason: Pain, Moderate (4-6)


   Last Admin: 03/02/17 08:12 Dose:  1 tab


Pantoprazole Sodium (Protonix)  40 mg PO QDAY [facility]


   Last Admin: 03/02/17 10:12 Dose:  40 mg


Sodium Chloride (Sodium Chloride Flush Syringe 10 Ml)  10 ml IV PRN PRN


   PRN Reason: LINE FLUSH


Zolpidem Tartrate (Ambien)  5 mg PO QHS PRN


   PRN Reason: Insomnia


   Last Admin: 03/02/17 01:30 Dose:  5 mg











Physical Examination


 Vital Signs











Pulse Resp Pulse Ox


 


 216 H  22   100 


 


 03/01/17 17:30  03/01/17 17:30  03/01/17 17:30














Results





 03/02/17 05:21





 03/02/17 05:21


 Cardiac Enzymes











  03/01/17 03/02/17 03/02/17 Range/Units





  22:35 05:21 05:21 


 


AST   31   (5-40)  units/L


 


CK-MB (CK-2)  5.6 H   4.7 H  (0.0-4.0)  ng/mL








 CBC











  03/02/17 Range/Units





  05:21 


 


WBC  4.8  (4.5-11.0)  K/mm3


 


RBC  3.83  (3.65-5.03)  M/mm3


 


Hgb  11.2 L  (11.8-15.2)  gm/dl


 


Hct  34.7 L  (35.5-45.6)  %


 


Plt Count  156  (140-440)  K/mm3


 


Lymph #  1.1 L  (1.2-5.4)  K/mm3


 


Mono #  0.4  (0.0-0.8)  K/mm3


 


Eos #  0.0  (0.0-0.4)  K/mm3


 


Baso #  0.0  (0.0-0.1)  K/mm3








 Comprehensive Metabolic Panel











  03/02/17 Range/Units





  05:21 


 


Sodium  144  (137-145)  mmol/L


 


Potassium  4.5  (3.6-5.0)  mmol/L


 


Chloride  102.9  ()  mmol/L


 


Carbon Dioxide  26  (22-30)  mmol/L


 


BUN  14  (9-20)  mg/dL


 


Creatinine  1.0  (0.8-1.5)  mg/dL


 


Glucose  88  ()  mg/dL


 


Calcium  8.0 L  (8.4-10.2)  mg/dL


 


AST  31  (5-40)  units/L


 


ALT  18  (7-56)  units/L


 


Alkaline Phosphatase  78  ()  units/L


 


Total Protein  6.1 L  (6.3-8.2)  g/dL


 


Albumin  3.8 L  (3.9-5)  g/dL














Assessment and Plan





Cardiology/Electrophysiology Service





Pt examined and evaluated and chart reviewed.  Agree with documentation by Ms. Gomez.





Pt with h/o dilated cardiomyopathy with last documented EF of 15-20%.  He was 

noted to be in atypical atrial flutter during last hospitalization 6 months ago 

and was placed on eliquis for anticoagulation in addition to standard medical 

therapy for CHF and cardiomyopathy.


He presented to hospital with palpitations, syncope and was noted to be in 

sustained wide complex tachycardia at 218 bpm.  His 12 lead ECG in [facility] is 

suggestive of VT.  He was hemodynamically unstable in the ED and cardioverted 

to sinus rhythm.  He currently feels well.





Assessment:


1.  Episode of hemodynamically unstable Ventricular Tachycardia.


2.  H/O atypical atrial flutter:  Anticoagulated with eliquis


3.  H/O Dilated Cardiomyopathy with EF 15-20%.  Currently on medical therapy.


4.  H/O Lung and thyroid masses noted on chest CT during last hospitalization.


5.  H/O Esophageal Stricture.


6.  Hypertension


7.  H/O Seizure disorder.





Recommend:


1.  Hold eliquis for now.


2.  Start amiodarone


3.  Will plan for left heart cath to exclude significant CAD - will need to 

hold eliquis 48 hours prior to procedure and will plan for monday morning.


4.  If no significant CAD which requires revascularization - he will need ICD 

of life-vest for secondary prevention.  Tentatively, will plan for ICD on 

tuesday.


5.  Meantime, he will need repeat evaluation for pulmonary mass - repeat chest 

CT and consider pulmonary consult.


6.  Otherwise, continue current medical therapy.





Adeline Craig MD

## 2017-03-02 NOTE — XRAY REPORT
AP CHEST : 03/01/17 17:30:00







CLINICAL: Chest pain.



COMPARISON:08/14/16



FINDINGS: Normal heart and pulmonary vessels. The lungs are normally 

expanded and clear.  The bones and soft tissues are unremarkable.



IMPRESSION: Normal chest.

## 2017-03-03 LAB
ANION GAP SERPL CALC-SCNC: 16 MMOL/L
BUN SERPL-MCNC: 18 MG/DL (ref 9–20)
BUN/CREAT SERPL: 18 %
CALCIUM SERPL-MCNC: 8.3 MG/DL (ref 8.4–10.2)
CHLORIDE SERPL-SCNC: 103 MMOL/L (ref 98–107)
CO2 SERPL-SCNC: 25 MMOL/L (ref 22–30)
GLUCOSE SERPL-MCNC: 107 MG/DL (ref 75–100)
INR PPP: 1.12 (ref 0.87–1.13)
POTASSIUM SERPL-SCNC: 4.3 MMOL/L (ref 3.6–5)
SODIUM SERPL-SCNC: 140 MMOL/L (ref 137–145)

## 2017-03-03 RX ADMIN — OXYCODONE AND ACETAMINOPHEN PRN TAB: 5; 325 TABLET ORAL at 15:47

## 2017-03-03 RX ADMIN — METOPROLOL TARTRATE SCH MG: 50 TABLET, FILM COATED ORAL at 09:55

## 2017-03-03 RX ADMIN — METOPROLOL TARTRATE SCH MG: 50 TABLET, FILM COATED ORAL at 15:48

## 2017-03-03 RX ADMIN — DEXTROSE AND SODIUM CHLORIDE SCH MLS/HR: 5; .9 INJECTION, SOLUTION INTRAVENOUS at 06:07

## 2017-03-03 RX ADMIN — LISINOPRIL SCH MG: 5 TABLET ORAL at 09:53

## 2017-03-03 RX ADMIN — AMIODARONE HYDROCHLORIDE SCH MG: 200 TABLET ORAL at 09:55

## 2017-03-03 RX ADMIN — OXYCODONE AND ACETAMINOPHEN PRN TAB: 5; 325 TABLET ORAL at 22:51

## 2017-03-03 RX ADMIN — MAGNESIUM HYDROXIDE PRN ML: 400 SUSPENSION ORAL at 21:21

## 2017-03-03 RX ADMIN — MAGNESIUM HYDROXIDE PRN ML: 400 SUSPENSION ORAL at 18:28

## 2017-03-03 RX ADMIN — IBUPROFEN PRN MG: 800 TABLET, FILM COATED ORAL at 21:16

## 2017-03-03 RX ADMIN — ONDANSETRON PRN MG: 2 INJECTION INTRAMUSCULAR; INTRAVENOUS at 15:49

## 2017-03-03 RX ADMIN — ZOLPIDEM TARTRATE PRN MG: 5 TABLET ORAL at 21:19

## 2017-03-03 RX ADMIN — OXYCODONE AND ACETAMINOPHEN PRN TAB: 5; 325 TABLET ORAL at 06:06

## 2017-03-03 RX ADMIN — AMIODARONE HYDROCHLORIDE SCH MG: 200 TABLET ORAL at 21:21

## 2017-03-03 RX ADMIN — DEXTROSE AND SODIUM CHLORIDE SCH MLS/HR: 5; .9 INJECTION, SOLUTION INTRAVENOUS at 20:04

## 2017-03-03 RX ADMIN — PANTOPRAZOLE SODIUM SCH MG: 40 TABLET, DELAYED RELEASE ORAL at 09:55

## 2017-03-03 RX ADMIN — ONDANSETRON PRN MG: 2 INJECTION INTRAMUSCULAR; INTRAVENOUS at 06:07

## 2017-03-03 NOTE — HISTORY AND PHYSICAL REPORT
CHIEF COMPLAINT:

1.  Passed out for 2 minutes.

2.  Ventricular tachycardia as per EMS. 



HISTORY OF PRESENT ILLNESS:  A 56-year-old -American male with history of

SVT and atrial fibrillation, apparently passed out while at home with friends,

was diaphoretic, became alert and oriented in a couple of minutes.  While en

route, the EMS noted the patient had V-tach and altered sensorium.  In the ER,

the patient had a V-tach and was converted back to sinus rhythm after 200 joules

of cardioversion.  The patient remembers the passing out episode.  The patient

also remembers the V-tach and the cardioversion shock.  Wants a workup to be

done for the V-tach and the syncope.  No other complaints.



PAST MEDICAL HISTORY:  Significant for hypertension.



PAST SURGICAL HISTORY:  _____ knee surgery and hand surgery.



SOCIAL HISTORY:  Smokes about a pack a day.



CURRENT MEDICATIONS:  Metoprolol ______ mg at bedtime, lisinopril 5 mg daily,

Protonix 40 mg daily, Eliquis 5 mg q. 12.



REVIEW OF SYSTEMS:  Significant for syncope, chest pain, and palpitations. 

Otherwise, review of systems is essentially negative.  A 14-point review of

systems is done.



PHYSICAL EXAMINATION:

GENERAL:  Middle aged male, cooperative during examination.

VITAL SIGNS:  Pulse was 217, come down to 80, respiratory rate is 22, sats are

100%.

HEENT:  Unremarkable.  Pupils equal and reactive.

NECK:  Supple, no lymphadenopathy, no thyromegaly.

LUNGS:  Clear to auscultation and percussion.  Good air entry.

CARDIOVASCULAR:  S1, S2 heard.  No gallop, no murmur, no rub.  Apical impulse in

left fifth intercostal space and midclavicular line.

ABDOMEN:  Soft and benign.  No hepatosplenomegaly.  No guarding, no rigidity. 

Hernial orifices are normal.

EXTREMITIES:  Good pedal pulses.  No pedal edema.

CENTRAL NERVOUS SYSTEM:  Alert and oriented x 4, nonfocal exam.

SKIN:  Normal.



LABORATORY DATA:  White count is 7400, hemoglobin is 12.5, hematocrit is 38.2,

platelet count is 211.  Bicarbonate is 21, BUN and creatinine is 14 and 1.2.  CK

is 393, CK-MB is 5.6.  One EKG shows V-tach, post-cardioversion EKG shows normal

sinus rhythm.



ASSESSMENT AND PLAN:

1.  Ventricular tachycardia, cardioverted, back to normal sinus rhythm.  We will

defer to Cardiology to start new medications, new antiarrhythmics. 

2.  Syncope.  Syncope workup initiated.

3.  Atrial fibrillation.  The patient on Eliquis 5 mg p.o. q.12.

4.  Hypertension.  Continue lisinopril and metoprolol.

5.  Gastroesophageal reflux disease.  Continue Protonix 40 mg daily.

6.  Deep venous thrombosis prophylaxis, Lovenox 40 mg subcutaneous daily.





DD: 03/02/2017 23:27

DT: 03/03/2017 00:07

[facility]# [id number]  [id number]

RADHA/NTS

## 2017-03-03 NOTE — PROGRESS NOTE
Assessment and Plan


Assessment and plan: 


1. Wide complex tachycardia- with dilated CMP -cardioverted with 200J in the ER

;  eliquis- held for cardiac cath and ICD placement on Monday and Tuesday; no 

ischemia by MPI 8/2016; EF 15-20% on echo 8/2016; cardiology consult appreciated

; for Lt heart cath on Monday;  EP consult with Dr. Craig - DOMENIC arrieta 


2. Hypertension- cotn metoprolol


3. PAF- eliquis held for procedure  


4. DVT prophylaxis- eliquis held for procedure; heparin for DVT prophylaxis 





History


Interval history: 


F/u wide complex tachycardia' syncope chest pain


Patient seen at the bedside; complains of difficulty swallow; gives h/o 

esophageal dilatation in the past  





Hospitalist Physical





- Constitutional


Vitals: 


 











Temp Pulse Resp BP Pulse Ox


 


 98.3 F   63   18   147/99   94 


 


 03/03/17 08:00  03/03/17 10:18  03/03/17 08:00  03/03/17 08:00  03/03/17 08:00











General appearance: Present: no acute distress, well-nourished





- EENT


Eyes: Present: PERRL, EOM intact.  Absent: scleral icterus, conjunctival 

injection


ENT: hearing intact, clear oral mucosa, no oropharyngeal erythema, no poor 

dentition





- Neck


Neck: Present: supple, normal ROM.  Absent: enlarged thyroid, masses or JVD





- Respiratory


Respiratory effort: normal


Respiratory: negative: diminished, rales, rhonchi, wheezing





- Cardiovascular


Rhythm: regular


Heart Sounds: Present: S1 & S2.  Absent: gallop





- Extremities


Extremities: no ischemia, pulses intact, pulses symmetrical, No edema


Peripheral Pulses: within normal limits





- Abdominal


General gastrointestinal: soft, non-tender, non-distended, normal bowel sounds





- Integumentary


Integumentary: Present: clear





- Psychiatric


Psychiatric: appropriate mood/affect, intact judgment & insight, cooperative





- Neurologic


Neurologic: CNII-XII intact





Results





- Labs


CBC & Chem 7: 


 03/02/17 05:21





 03/03/17 06:46


Labs: 


 Laboratory Last Values











WBC  4.8 K/mm3 (4.5-11.0)   03/02/17  05:21    


 


RBC  3.83 M/mm3 (3.65-5.03)   03/02/17  05:21    


 


Hgb  11.2 gm/dl (11.8-15.2)  L  03/02/17  05:21    


 


Hct  34.7 % (35.5-45.6)  L  03/02/17  05:21    


 


MCV  90 fl (84-94)   03/02/17  05:21    


 


MCH  29 pg (28-32)   03/02/17  05:21    


 


MCHC  32 % (32-34)   03/02/17  05:21    


 


RDW  16.6 % (13.2-15.2)  H  03/02/17  05:21    


 


Plt Count  156 K/mm3 (140-440)   03/02/17  05:21    


 


Lymph % (Auto)  22.8 % (13.4-35.0)   03/02/17  05:21    


 


Mono % (Auto)  8.4 % (0.0-7.3)  H  03/02/17  05:21    


 


Eos % (Auto)  1.0 % (0.0-4.3)   03/02/17  05:21    


 


Baso % (Auto)  0.9 % (0.0-1.8)   03/02/17  05:21    


 


Lymph #  1.1 K/mm3 (1.2-5.4)  L  03/02/17  05:21    


 


Mono #  0.4 K/mm3 (0.0-0.8)   03/02/17  05:21    


 


Eos #  0.0 K/mm3 (0.0-0.4)   03/02/17  05:21    


 


Baso #  0.0 K/mm3 (0.0-0.1)   03/02/17  05:21    


 


Seg Neutrophils %  66.9 % (40.0-70.0)   03/02/17  05:21    


 


Seg Neutrophils #  3.2 K/mm3 (1.8-7.7)   03/02/17  05:21    


 


PT  14.3 Sec. (12.2-14.9)   03/03/17  06:46    


 


INR  1.12  (0.87-1.13)   03/03/17  06:46    


 


APTT  28.9 Sec. (24.2-36.6)   03/01/17  17:30    


 


Sodium  140 mmol/L (137-145)   03/03/17  06:46    


 


Potassium  4.3 mmol/L (3.6-5.0)   03/03/17  06:46    


 


Chloride  103.0 mmol/L ()   03/03/17  06:46    


 


Carbon Dioxide  25 mmol/L (22-30)   03/03/17  06:46    


 


Anion Gap  16 mmol/L  03/03/17  06:46    


 


BUN  18 mg/dL (9-20)   03/03/17  06:46    


 


Creatinine  1.0 mg/dL (0.8-1.5)   03/03/17  06:46    


 


Estimated GFR  > 60 ml/min  03/03/17  06:46    


 


BUN/Creatinine Ratio  18.00 %  03/03/17  06:46    


 


Glucose  107 mg/dL ()  H  03/03/17  06:46    


 


Hemoglobin A1c  5.6 % (4-6)   03/01/17  22:35    


 


Calcium  8.3 mg/dL (8.4-10.2)  L  03/03/17  06:46    


 


Magnesium  1.7 mg/dL (1.7-2.3)   03/01/17  17:30    


 


Total Bilirubin  0.8 mg/dL (0.1-1.2)   03/02/17  05:21    


 


AST  31 units/L (5-40)   03/02/17  05:21    


 


ALT  18 units/L (7-56)   03/02/17  05:21    


 


Alkaline Phosphatase  78 units/L ()   03/02/17  05:21    


 


Total Creatine Kinase  343 units/L ()  H  03/02/17  05:21    


 


CK-MB (CK-2)  4.7 ng/mL (0.0-4.0)  H  03/02/17  05:21    


 


CK-MB (CK-2) Rel Index  1.3  (0-4)   03/02/17  05:21    


 


Troponin T  < 0.010 ng/mL (0.00-0.029)   03/02/17  05:21    


 


NT-Pro-B Natriuret Pep  308.9 pg/mL (0-900)   03/01/17  17:30    


 


Total Protein  6.1 g/dL (6.3-8.2)  L  03/02/17  05:21    


 


Albumin  3.8 g/dL (3.9-5)  L  03/02/17  05:21    


 


Albumin/Globulin Ratio  1.7 %  03/02/17  05:21    





echo-ef 25%; cmp
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
## 2017-03-03 NOTE — PROGRESS NOTE
Assessment and Plan





Unstable Ventricular tachycardia


   terminated by cardioversion


Hx of paroxysmal atrial flutter


   on eliquis for anticoagulation


Hx of dilated cardiomyopathy


   no ischemia by MPI 8/2016


   EF 15-20% on echo 8/2016


Hypertension


Hx of Seizure disorder


Hx of Lung and thyroid masses noted on chest CT during last hospitalization.


Hx of Esophageal Stricture.





Recommendations:


Continue amiodarone for suppression.


Will plan for left heart cath to exclude significant CAD - will need to hold 

eliquis 48 hours prior to procedure and will plan for monday morning.


If no significant CAD which requires revascularization - he will need ICD of 

life-vest for secondary prevention.  Tentatively, will plan for ICD on tuesday.


Evaluation for pulmonary mass - repeat chest CT and consider pulmonary consult.








Subjective


Date of service: 03/03/17


Interval history: 





Patient reports he is feeling better. No events on telemetry overnight.





Objective


 Vital Signs











  Temp Pulse Pulse Pulse Pulse Resp Resp


 


 03/03/17 08:00  98.3 F    64   18 


 


 03/03/17 06:55       18 


 


 03/03/17 06:06       20 


 


 03/03/17 05:27  98.1 F    64   18 


 


 03/03/17 00:35       20 


 


 03/02/17 23:41        18


 


 03/02/17 23:40  98 F    82   18 


 


 03/02/17 23:35   82     20 


 


 03/02/17 21:34       20 


 


 03/02/17 21:18  98.9 F     74  20 


 


 03/02/17 21:14       20 


 


 03/02/17 21:04       20 


 


 03/02/17 20:00    76    20 


 


 03/02/17 19:20   72     


 


 03/02/17 17:00  98.8 F     74  18 


 


 03/02/17 15:40  98.8 F     74  18 














  BP BP Pulse Ox


 


 03/03/17 08:00   147/99  94


 


 03/03/17 06:55   


 


 03/03/17 06:06   


 


 03/03/17 05:27   137/92  98


 


 03/03/17 00:35   


 


 03/02/17 23:41   


 


 03/02/17 23:40   170/100  93


 


 03/02/17 23:35  170/100  


 


 03/02/17 21:34   


 


 03/02/17 21:18   144/99  97


 


 03/02/17 21:14   


 


 03/02/17 21:04   


 


 03/02/17 20:00    98


 


 03/02/17 19:20   


 


 03/02/17 17:00   158/95  97


 


 03/02/17 15:40   158/95  97














- Physical Examination


General: No Apparent Distress


HEENT: Positive: PERRL


Neck: Positive: trachea midline


Cardiac: Positive: Reg Rate and Rhythm


Lungs: Positive: Decreased Breath Sounds





- Labs and Meds


 Coagulation











  03/03/17 Range/Units





  06:46 


 


PT  14.3  (12.2-14.9)  Sec.


 


INR  1.12  (0.87-1.13)  








 Comprehensive Metabolic Panel











  03/03/17 Range/Units





  06:46 


 


Sodium  140  (137-145)  mmol/L


 


Potassium  4.3  (3.6-5.0)  mmol/L


 


Chloride  103.0  ()  mmol/L


 


Carbon Dioxide  25  (22-30)  mmol/L


 


BUN  18  (9-20)  mg/dL


 


Creatinine  1.0  (0.8-1.5)  mg/dL


 


Glucose  107 H  ()  mg/dL


 


Calcium  8.3 L  (8.4-10.2)  mg/dL

## 2017-03-04 RX ADMIN — LISINOPRIL SCH MG: 5 TABLET ORAL at 09:08

## 2017-03-04 RX ADMIN — ONDANSETRON PRN MG: 2 INJECTION INTRAMUSCULAR; INTRAVENOUS at 09:05

## 2017-03-04 RX ADMIN — DEXTROSE AND SODIUM CHLORIDE SCH MLS/HR: 5; .9 INJECTION, SOLUTION INTRAVENOUS at 09:09

## 2017-03-04 RX ADMIN — PANTOPRAZOLE SODIUM SCH MG: 40 TABLET, DELAYED RELEASE ORAL at 09:08

## 2017-03-04 RX ADMIN — METOPROLOL TARTRATE SCH MG: 50 TABLET, FILM COATED ORAL at 00:32

## 2017-03-04 RX ADMIN — METOPROLOL TARTRATE SCH MG: 50 TABLET, FILM COATED ORAL at 16:33

## 2017-03-04 RX ADMIN — HEPARIN SODIUM SCH: 5000 INJECTION, SOLUTION INTRAVENOUS; SUBCUTANEOUS at 21:09

## 2017-03-04 RX ADMIN — HEPARIN SODIUM SCH UNIT: 5000 INJECTION, SOLUTION INTRAVENOUS; SUBCUTANEOUS at 09:07

## 2017-03-04 RX ADMIN — ZOLPIDEM TARTRATE PRN MG: 5 TABLET ORAL at 23:05

## 2017-03-04 RX ADMIN — HYDROMORPHONE HYDROCHLORIDE PRN MG: 1 INJECTION, SOLUTION INTRAMUSCULAR; INTRAVENOUS; SUBCUTANEOUS at 14:47

## 2017-03-04 RX ADMIN — AMIODARONE HYDROCHLORIDE SCH MG: 200 TABLET ORAL at 09:07

## 2017-03-04 RX ADMIN — AMIODARONE HYDROCHLORIDE SCH MG: 200 TABLET ORAL at 21:07

## 2017-03-04 RX ADMIN — METOPROLOL TARTRATE SCH MG: 50 TABLET, FILM COATED ORAL at 09:06

## 2017-03-04 RX ADMIN — BUTALBITAL, ACETAMINOPHEN, AND CAFFEINE PRN TAB: 50; 325; 40 TABLET ORAL at 21:07

## 2017-03-04 RX ADMIN — METOPROLOL TARTRATE SCH MG: 50 TABLET, FILM COATED ORAL at 23:11

## 2017-03-04 RX ADMIN — OXYCODONE AND ACETAMINOPHEN PRN TAB: 5; 325 TABLET ORAL at 16:37

## 2017-03-04 NOTE — PROGRESS NOTE
Assessment and Plan





- Patient Problems


(1) CAD (coronary artery disease)


Current Visit: Yes   Status: Acute   


Qualifiers: 


   Coronary Disease-Associated Artery/Lesion type: C   Native vs. transplanted 

heart: N   Associated angina: A 


Plan to address problem: 


SUSPECTED,,,CATH MONDAY


N/V NOTED,,GI ON THE CASE


MASS NOTED ON PRIOR W/UP








(2) Ventricular tachycardia, monomorphic


Current Visit: Yes   Status: Acute   





(3) Atrial flutter


Current Visit: No   Status: Acute   


Qualifiers: 


   Atrial flutter type: A 





(4) Dilated cardiomyopathy


Current Visit: No   Status: Acute   





(5) Esophageal dysphagia


Current Visit: No   Status: Acute   





(6) Hypertension


Current Visit: No   Status: Acute   


Qualifiers: 


   Hypertension type: H 





Subjective


Date of service: 03/04/17


Interval history: 





C\O PAIN,,N/V





Objective


 Vital Signs











  Temp Pulse Pulse Pulse Pulse Resp Resp


 


 03/04/17 10:00   68  68    20  20


 


 03/04/17 07:50  98.5 F    68   20 


 


 03/04/17 05:05  98.2 F    65   20 


 


 03/04/17 00:52     69   


 


 03/04/17 00:49   70     


 


 03/04/17 00:48  98.4 F     69  20 


 


 03/04/17 00:32   69     


 


 03/03/17 21:40  100.2 F H     68  20 


 


 03/03/17 15:48   69     














  Resp BP BP Pulse Ox


 


 03/04/17 10:00  20    97


 


 03/04/17 07:50    158/105  97


 


 03/04/17 05:05    143/96  91


 


 03/04/17 00:52    


 


 03/04/17 00:49    


 


 03/04/17 00:48    147/100  92


 


 03/04/17 00:32   140/100  


 


 03/03/17 21:40    158/95  91


 


 03/03/17 15:48   138/99  














- Physical Examination


General: Other (APPEARS UNCOMFORTABLE)


HEENT: Positive: PERRL


Neck: Positive: trachea midline


Cardiac: Positive: Reg Rate and Rhythm


Lungs: Positive: clear to auscultation


Extremities: Present: edema (NO)
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
## 2017-03-05 RX ADMIN — METOPROLOL TARTRATE SCH MG: 50 TABLET, FILM COATED ORAL at 08:38

## 2017-03-05 RX ADMIN — LISINOPRIL SCH MG: 5 TABLET ORAL at 09:17

## 2017-03-05 RX ADMIN — OXYCODONE AND ACETAMINOPHEN PRN TAB: 5; 325 TABLET ORAL at 08:39

## 2017-03-05 RX ADMIN — OXYCODONE AND ACETAMINOPHEN PRN TAB: 5; 325 TABLET ORAL at 03:31

## 2017-03-05 RX ADMIN — METOPROLOL TARTRATE SCH MG: 50 TABLET, FILM COATED ORAL at 23:33

## 2017-03-05 RX ADMIN — ONDANSETRON PRN MG: 2 INJECTION INTRAMUSCULAR; INTRAVENOUS at 07:41

## 2017-03-05 RX ADMIN — PANTOPRAZOLE SODIUM SCH MG: 40 TABLET, DELAYED RELEASE ORAL at 09:18

## 2017-03-05 RX ADMIN — ZOLPIDEM TARTRATE PRN MG: 5 TABLET ORAL at 23:37

## 2017-03-05 RX ADMIN — OXYCODONE AND ACETAMINOPHEN PRN TAB: 5; 325 TABLET ORAL at 14:36

## 2017-03-05 RX ADMIN — DEXTROSE AND SODIUM CHLORIDE SCH MLS/HR: 5; .9 INJECTION, SOLUTION INTRAVENOUS at 12:13

## 2017-03-05 RX ADMIN — AMIODARONE HYDROCHLORIDE SCH MG: 200 TABLET ORAL at 09:18

## 2017-03-05 RX ADMIN — HEPARIN SODIUM SCH: 5000 INJECTION, SOLUTION INTRAVENOUS; SUBCUTANEOUS at 10:00

## 2017-03-05 RX ADMIN — BUTALBITAL, ACETAMINOPHEN, AND CAFFEINE PRN TAB: 50; 325; 40 TABLET ORAL at 22:11

## 2017-03-05 RX ADMIN — METOPROLOL TARTRATE SCH MG: 50 TABLET, FILM COATED ORAL at 16:47

## 2017-03-05 RX ADMIN — HEPARIN SODIUM SCH: 5000 INJECTION, SOLUTION INTRAVENOUS; SUBCUTANEOUS at 22:12

## 2017-03-05 RX ADMIN — AMIODARONE HYDROCHLORIDE SCH MG: 200 TABLET ORAL at 22:11
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
## 2017-03-05 NOTE — PROGRESS NOTE
Assessment and Plan





- Patient Problems


(1) CAD (coronary artery disease)


Current Visit: Yes   Status: Acute   


Qualifiers: 


   Coronary Disease-Associated Artery/Lesion type: C   Native vs. transplanted 

heart: N   Associated angina: A 


Plan to address problem: 


CAD SUSPECTED,,,CATH MONDAY


N/V NOTED,,GI ON THE CASE


MASS NOTED ON PRIOR W/UP,,HA'S ARE CHRONIC


RHYTHM APPEARS STABLE ON CURRENT Tx








(2) Ventricular tachycardia, monomorphic


Current Visit: Yes   Status: Acute   





(3) Atrial flutter


Current Visit: No   Status: Acute   


Qualifiers: 


   Atrial flutter type: A 





(4) Dilated cardiomyopathy


Current Visit: No   Status: Acute   





(5) Esophageal dysphagia


Current Visit: No   Status: Acute   





(6) Hypertension


Current Visit: No   Status: Acute   


Qualifiers: 


   Hypertension type: H 





Subjective


Date of service: 03/05/17


Interval history: 


CHRONIC HA's,,CONTINUED DYSPHAGIA,,NO CP








Objective


 Vital Signs











  Temp Pulse Pulse Pulse Resp Resp Resp


 


 03/05/17 09:17       


 


 03/05/17 08:38   64     


 


 03/05/17 08:23   63     


 


 03/05/17 06:10  99.2 F    63  20  


 


 03/05/17 00:51  98.4 F    64  20  


 


 03/04/17 23:11   62     


 


 03/04/17 22:00   61    20  


 


 03/04/17 21:41  98.7 F    62  20  


 


 03/04/17 16:37      20  


 


 03/04/17 15:55  98.6 F    60  18  


 


 03/04/17 14:47      20  


 


 03/04/17 10:00   68  68   20  20  20














  BP BP Pulse Ox


 


 03/05/17 09:17  179/92  


 


 03/05/17 08:38   


 


 03/05/17 08:23   


 


 03/05/17 06:10   156/103  92


 


 03/05/17 00:51   144/90  93


 


 03/04/17 23:11  156/107  


 


 03/04/17 22:00   


 


 03/04/17 21:41   161/101  100


 


 03/04/17 16:37   


 


 03/04/17 15:55   161/106 


 


 03/04/17 14:47   


 


 03/04/17 10:00    97














- Physical Examination


General: Other (APPEARS UNCOMFORTABLE)


HEENT: Positive: PERRL, Other (HOARSE)


Neck: Positive: trachea midline


Cardiac: Positive: Reg Rate and Rhythm


Lungs: Positive: clear to auscultation


Abdomen: Positive: Other (NONTENDER)


Extremities: Present: edema (NO)

## 2017-03-06 LAB
ANION GAP SERPL CALC-SCNC: 16 MMOL/L
APTT BLD: 31.8 SEC. (ref 24.2–36.6)
BUN SERPL-MCNC: 18 MG/DL (ref 9–20)
BUN/CREAT SERPL: 22.5 %
CALCIUM SERPL-MCNC: 8.3 MG/DL (ref 8.4–10.2)
CHLORIDE SERPL-SCNC: 97.5 MMOL/L (ref 98–107)
CO2 SERPL-SCNC: 23 MMOL/L (ref 22–30)
GLUCOSE SERPL-MCNC: 107 MG/DL (ref 75–100)
HCT VFR BLD CALC: 33.6 % (ref 35.5–45.6)
HGB BLD-MCNC: 10.9 GM/DL (ref 11.8–15.2)
INR PPP: 1.18 (ref 0.87–1.13)
MCH RBC QN AUTO: 29 PG (ref 28–32)
MCHC RBC AUTO-ENTMCNC: 32 % (ref 32–34)
MCV RBC AUTO: 90 FL (ref 84–94)
PLATELET # BLD: 126 K/MM3 (ref 140–440)
POTASSIUM SERPL-SCNC: 3.9 MMOL/L (ref 3.6–5)
RBC # BLD AUTO: 3.72 M/MM3 (ref 3.65–5.03)
SODIUM SERPL-SCNC: 133 MMOL/L (ref 137–145)
WBC # BLD AUTO: 5.9 K/MM3 (ref 4.5–11)

## 2017-03-06 PROCEDURE — B2111ZZ FLUOROSCOPY OF MULTIPLE CORONARY ARTERIES USING LOW OSMOLAR CONTRAST: ICD-10-PCS | Performed by: INTERNAL MEDICINE

## 2017-03-06 PROCEDURE — B2151ZZ FLUOROSCOPY OF LEFT HEART USING LOW OSMOLAR CONTRAST: ICD-10-PCS | Performed by: INTERNAL MEDICINE

## 2017-03-06 PROCEDURE — 4A023N7 MEASUREMENT OF CARDIAC SAMPLING AND PRESSURE, LEFT HEART, PERCUTANEOUS APPROACH: ICD-10-PCS | Performed by: INTERNAL MEDICINE

## 2017-03-06 RX ADMIN — HEPARIN SODIUM SCH UNIT: 5000 INJECTION, SOLUTION INTRAVENOUS; SUBCUTANEOUS at 21:48

## 2017-03-06 RX ADMIN — METOPROLOL TARTRATE SCH MG: 50 TABLET, FILM COATED ORAL at 16:51

## 2017-03-06 RX ADMIN — OXYCODONE AND ACETAMINOPHEN PRN TAB: 5; 325 TABLET ORAL at 21:49

## 2017-03-06 RX ADMIN — AMIODARONE HYDROCHLORIDE SCH MG: 200 TABLET ORAL at 21:49

## 2017-03-06 RX ADMIN — METOPROLOL TARTRATE SCH: 50 TABLET, FILM COATED ORAL at 08:00

## 2017-03-06 RX ADMIN — OXYCODONE AND ACETAMINOPHEN PRN TAB: 5; 325 TABLET ORAL at 10:27

## 2017-03-06 RX ADMIN — FENTANYL CITRATE ONE MCG: 50 INJECTION, SOLUTION INTRAMUSCULAR; INTRAVENOUS at 09:01

## 2017-03-06 RX ADMIN — FENTANYL CITRATE ONE MCG: 50 INJECTION, SOLUTION INTRAMUSCULAR; INTRAVENOUS at 08:55

## 2017-03-06 RX ADMIN — HEPARIN SODIUM SCH: 5000 INJECTION, SOLUTION INTRAVENOUS; SUBCUTANEOUS at 22:05

## 2017-03-06 RX ADMIN — PANTOPRAZOLE SODIUM SCH MG: 40 TABLET, DELAYED RELEASE ORAL at 10:27

## 2017-03-06 RX ADMIN — OXYCODONE AND ACETAMINOPHEN PRN TAB: 5; 325 TABLET ORAL at 04:04

## 2017-03-06 RX ADMIN — LISINOPRIL SCH MG: 5 TABLET ORAL at 10:27

## 2017-03-06 RX ADMIN — GUAIFENESIN AND CODEINE PHOSPHATE PRN ML: 100; 10 SOLUTION ORAL at 21:50

## 2017-03-06 RX ADMIN — AMIODARONE HYDROCHLORIDE SCH MG: 200 TABLET ORAL at 10:27

## 2017-03-06 RX ADMIN — ZOLPIDEM TARTRATE PRN MG: 5 TABLET ORAL at 21:55

## 2017-03-06 RX ADMIN — HEPARIN SODIUM SCH: 5000 INJECTION, SOLUTION INTRAVENOUS; SUBCUTANEOUS at 10:28

## 2017-03-06 RX ADMIN — OXYCODONE AND ACETAMINOPHEN PRN TAB: 5; 325 TABLET ORAL at 16:51

## 2017-03-06 RX ADMIN — DEXTROSE AND SODIUM CHLORIDE SCH MLS/HR: 5; .9 INJECTION, SOLUTION INTRAVENOUS at 21:58

## 2017-03-06 RX ADMIN — DEXTROSE AND SODIUM CHLORIDE SCH MLS/HR: 5; .9 INJECTION, SOLUTION INTRAVENOUS at 10:28
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
## 2017-03-06 NOTE — CARDIAC CATHERIZATION REPORT
LEFT HEART CATHETERIZATION 



INDICATION FOR PROCEDURE:  Sustained ventricular tachycardia, cardiomyopathy.



ORDERING PHYSICIAN:  Inderjit Craig MD 



PROCEDURES PERFORMED:

1.  Selective left and right coronary angiography.

2.  Left ventriculography.



DESCRIPTION OF PROCEDURE:  After obtaining written consent, the patient was

draped using sterile technique.  A 2% lidocaine was injected into the right

wrist.  A 5-[place] vascular sheath was inserted into the right radial artery.  A

5-[place] JL3.5 catheter was used to selectively engage the left coronary artery.

 A 5-[place] JR4 catheter was used to selectively engage the right coronary

artery.  A 5-[place] JR4 catheter was used to hand inject the left

ventriculogram.  No complications occurred during the procedure.



Hemostasis was achieved at the end of the procedure using manual pressure.



SPECIMEN REMOVED:  None.



ESTIMATED BLOOD LOSS:  Minimal.



FINDINGS:

HEMODYNAMICS:

1.  Aortic pressure was 152/93.

2.  Left ventricular systolic pressure 155 mmHg.

3.  Left ventricular end diastolic pressure 28 mmHg.



CARDIAC STRUCTURES:  The left ventricle is severely reduced and systolic

function with ejection fraction estimated between 25-30%.  There is severe

global left ventricular hypokinesis.



CORONARY ANATOMY:

1.  This is a right dominant circulation.

2.  The left main is angiographically normal.

3.  The left main is a short vessel.

4.  The left anterior descending artery is angiographically normal.

5.  The left circumflex artery is angiographically normal.

6.  The right coronary artery is a dominant vessel and is angiographically

normal.



IMPRESSION:

1.  Angiographically normal coronary arteries.

2.  Severe global left ventricular hypokinesis with an ejection fraction

estimated between 25-30%.

3.  LVEDP measured at 28 mmHg.



RECOMMENDATIONS:  Proceed with an AICD insertion tomorrow for sustained

ventricular tachycardia in the setting of nonischemic cardiomyopathy.





DD: 03/06/2017 09:10

DT: 03/06/2017 09:59

JOB# [id number]  [id number]

RONNY/CLEMENT

## 2017-03-06 NOTE — PROGRESS NOTE
Assessment and Plan





Assessment:





1.  Episode of hemodynamically unstable Ventricular Tachycardia.


2.  H/O atypical atrial flutter:  Anticoagulated with eliquis as outpatient (

currently on hold)


3.  H/O Dilated Cardiomyopathy with EF 15-20%.  Currently on medical therapy.


   normal coronaries by C 


4.  H/O Lung and thyroid masses noted on chest CT during last hospitalization.


5.  H/O Esophageal Stricture.


6.  Hypertension


7.  H/O Seizure disorder.





Recommendations:





AICD insertion tomorrow


Continue day 5/10 amiodarone loading








Subjective


Date of service: 03/06/17


Principal diagnosis: VT


Interval history: 


Patient underwent a LHC today revealing no coronary artery disease - no 

complications








Objective


 Vital Signs











  Temp Pulse Pulse Pulse Pulse Resp BP


 


 03/06/17 00:00  98.6 F   61    20 


 


 03/05/17 23:33   61      153/106


 


 03/05/17 22:00   65     


 


 03/05/17 20:37  98.4 F   63    18 


 


 03/05/17 16:47   62      175/95


 


 03/05/17 16:00  98.5 F    62   20 


 


 03/05/17 12:13  98.1 F     61  16 


 


 03/05/17 10:00   63     


 


 03/05/17 09:17        179/92














  BP BP Pulse Ox


 


 03/06/17 00:00  153/106   97


 


 03/05/17 23:33   


 


 03/05/17 22:00    98


 


 03/05/17 20:37  154/102   96


 


 03/05/17 16:47   


 


 03/05/17 16:00   170/105  96


 


 03/05/17 12:13  164/98   95


 


 03/05/17 10:00   


 


 03/05/17 09:17   














- Physical Examination


General: Other (APPEARS UNCOMFORTABLE)


HEENT: Positive: PERRL, Other (HOARSE)


Neck: Positive: trachea midline


Cardiac: Positive: Reg Rate and Rhythm


Lungs: Positive: Normal Exam


Abdomen: Positive: Soft, Other (NONTENDER)


Extremities: Present: edema (NO)





- Labs and Meds


 Coagulation











  03/06/17 Range/Units





  05:19 


 


PT  14.9  (12.2-14.9)  Sec.


 


INR  1.18 H  (0.87-1.13)  


 


APTT  31.8  (24.2-36.6)  Sec.








 CBC











  03/06/17 Range/Units





  05:19 


 


WBC  5.9  (4.5-11.0)  K/mm3


 


RBC  3.72  (3.65-5.03)  M/mm3


 


Hgb  10.9 L  (11.8-15.2)  gm/dl


 


Hct  33.6 L  (35.5-45.6)  %


 


Plt Count  126 L  (140-440)  K/mm3








 Comprehensive Metabolic Panel











  03/06/17 Range/Units





  05:19 


 


Sodium  133 L  (137-145)  mmol/L


 


Potassium  3.9  (3.6-5.0)  mmol/L


 


Chloride  97.5 L  ()  mmol/L


 


Carbon Dioxide  23  (22-30)  mmol/L


 


BUN  18  (9-20)  mg/dL


 


Creatinine  0.8  (0.8-1.5)  mg/dL


 


Glucose  107 H  ()  mg/dL


 


Calcium  8.3 L  (8.4-10.2)  mg/dL

## 2017-03-07 LAB
ANION GAP SERPL CALC-SCNC: 22 MMOL/L
BUN SERPL-MCNC: 14 MG/DL (ref 9–20)
BUN/CREAT SERPL: 17.5 %
CALCIUM SERPL-MCNC: 8.1 MG/DL (ref 8.4–10.2)
CHLORIDE SERPL-SCNC: 100.6 MMOL/L (ref 98–107)
CO2 SERPL-SCNC: 20 MMOL/L (ref 22–30)
GLUCOSE SERPL-MCNC: 96 MG/DL (ref 75–100)
INR PPP: 1.08 (ref 0.87–1.13)
POTASSIUM SERPL-SCNC: 3.8 MMOL/L (ref 3.6–5)
SODIUM SERPL-SCNC: 139 MMOL/L (ref 137–145)

## 2017-03-07 RX ADMIN — ZOLPIDEM TARTRATE PRN MG: 5 TABLET ORAL at 23:27

## 2017-03-07 RX ADMIN — PANTOPRAZOLE SODIUM SCH MG: 40 TABLET, DELAYED RELEASE ORAL at 11:07

## 2017-03-07 RX ADMIN — CEFAZOLIN ONE MLS: 330 INJECTION, POWDER, FOR SOLUTION INTRAMUSCULAR; INTRAVENOUS at 14:10

## 2017-03-07 RX ADMIN — METOPROLOL TARTRATE SCH MG: 50 TABLET, FILM COATED ORAL at 00:14

## 2017-03-07 RX ADMIN — OXYCODONE AND ACETAMINOPHEN PRN TAB: 5; 325 TABLET ORAL at 06:34

## 2017-03-07 RX ADMIN — MIDAZOLAM ONE MG: 1 INJECTION INTRAMUSCULAR; INTRAVENOUS at 14:44

## 2017-03-07 RX ADMIN — SODIUM CHLORIDE ONE MLS: 0.9 IRRIGANT IRRIGATION at 13:34

## 2017-03-07 RX ADMIN — CEFAZOLIN ONE MLS: 330 INJECTION, POWDER, FOR SOLUTION INTRAMUSCULAR; INTRAVENOUS at 13:34

## 2017-03-07 RX ADMIN — AMIODARONE HYDROCHLORIDE SCH MG: 200 TABLET ORAL at 21:21

## 2017-03-07 RX ADMIN — LISINOPRIL SCH MG: 5 TABLET ORAL at 11:08

## 2017-03-07 RX ADMIN — FENTANYL CITRATE ONE MCG: 50 INJECTION, SOLUTION INTRAMUSCULAR; INTRAVENOUS at 14:13

## 2017-03-07 RX ADMIN — BUTALBITAL, ACETAMINOPHEN, AND CAFFEINE PRN TAB: 50; 325; 40 TABLET ORAL at 09:09

## 2017-03-07 RX ADMIN — FENTANYL CITRATE ONE MCG: 50 INJECTION, SOLUTION INTRAMUSCULAR; INTRAVENOUS at 14:37

## 2017-03-07 RX ADMIN — LIDOCAINE HYDROCHLORIDE ONE ML: 20 INJECTION, SOLUTION INFILTRATION; PERINEURAL at 13:36

## 2017-03-07 RX ADMIN — METOPROLOL TARTRATE SCH MG: 50 TABLET, FILM COATED ORAL at 11:11

## 2017-03-07 RX ADMIN — MIDAZOLAM ONE MG: 1 INJECTION INTRAMUSCULAR; INTRAVENOUS at 13:36

## 2017-03-07 RX ADMIN — SODIUM CHLORIDE ONE MLS: 0.9 IRRIGANT IRRIGATION at 14:20

## 2017-03-07 RX ADMIN — MIDAZOLAM ONE MG: 1 INJECTION INTRAMUSCULAR; INTRAVENOUS at 14:36

## 2017-03-07 RX ADMIN — AMIODARONE HYDROCHLORIDE SCH MG: 200 TABLET ORAL at 11:07

## 2017-03-07 RX ADMIN — OXYCODONE AND ACETAMINOPHEN PRN TAB: 5; 325 TABLET ORAL at 23:26

## 2017-03-07 RX ADMIN — METOPROLOL TARTRATE SCH MG: 50 TABLET, FILM COATED ORAL at 23:25

## 2017-03-07 RX ADMIN — DEXTROSE AND SODIUM CHLORIDE SCH MLS/HR: 5; .9 INJECTION, SOLUTION INTRAVENOUS at 13:30

## 2017-03-07 RX ADMIN — FENTANYL CITRATE ONE MCG: 50 INJECTION, SOLUTION INTRAMUSCULAR; INTRAVENOUS at 13:36

## 2017-03-07 RX ADMIN — BUPIVACAINE HYDROCHLORIDE ONE ML: 5 INJECTION, SOLUTION EPIDURAL; INTRACAUDAL at 13:37

## 2017-03-07 RX ADMIN — LIDOCAINE HYDROCHLORIDE ONE ML: 20 INJECTION, SOLUTION INFILTRATION; PERINEURAL at 14:21

## 2017-03-07 RX ADMIN — MIDAZOLAM ONE MG: 1 INJECTION INTRAMUSCULAR; INTRAVENOUS at 14:13

## 2017-03-07 RX ADMIN — HEPARIN SODIUM SCH: 5000 INJECTION, SOLUTION INTRAVENOUS; SUBCUTANEOUS at 11:08

## 2017-03-07 RX ADMIN — BUPIVACAINE HYDROCHLORIDE ONE ML: 5 INJECTION, SOLUTION EPIDURAL; INTRACAUDAL at 14:21

## 2017-03-07 RX ADMIN — OXYCODONE AND ACETAMINOPHEN PRN TAB: 5; 325 TABLET ORAL at 19:19

## 2017-03-07 RX ADMIN — GUAIFENESIN AND CODEINE PHOSPHATE PRN ML: 100; 10 SOLUTION ORAL at 09:05

## 2017-03-07 RX ADMIN — METOPROLOL TARTRATE SCH: 50 TABLET, FILM COATED ORAL at 00:16

## 2017-03-07 RX ADMIN — CEFAZOLIN SCH MLS/HR: 330 INJECTION, POWDER, FOR SOLUTION INTRAMUSCULAR; INTRAVENOUS at 21:25

## 2017-03-07 RX ADMIN — GUAIFENESIN AND CODEINE PHOSPHATE PRN ML: 100; 10 SOLUTION ORAL at 21:21

## 2017-03-07 NOTE — VASCULAR LAB REPORT
CAROTID DUPLEX STUDY:



RIGHT        PSVEDV

CCA PROX:8021

CCA DIST:8027

ICA PROX:[id number]

ICA MID:[id number]

ICA DIST:8037

ECA:         7716

VERT:              57              19  



LEFT         PSVEDV

CCA PROX:7718

CCA DIST:7821

ICA PROX:[id number]

ICA MID:[id number]

ICA DIST:8729

ECA:               6613

VERT:              68        25





REASON FOR EXAM: Carotid artery stenosis/syncope.

     

COMMENTS ON THE RIGHT:

Doppler frequency analysis is consistent with 16 to 49 percent diameter 

reduction of the internal carotid artery.  Minimal amount of plaque is 

seen.  The common carotid artery is patent. The external carotid artery 

is patent.  The vertebral artery has antegrade flow.



COMMENTS ON THE LEFT:

Doppler frequency analysis is consistent with 16 to 49 percent diameter 

reduction of the internal carotid artery.  Minimal amount of plaque is 

seen.  The common carotid artery is patent. The external carotid artery 

is patent.  The vertebral artery has antegrade flow.



IMPRESSION:

Less than 50% diameter reduction in the internal carotid arteries 

bilaterally.



Consider repeat carotid artery duplex in 12 months.
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
## 2017-03-07 NOTE — PROGRESS NOTE
Assessment and Plan





Unstable Ventricular tachycardia


   terminated by cardioversion


Hx of paroxysmal atrial flutter


   on eliquis as an outpatient, currently on hold


Hx of dilated cardiomyopathy


   no ischemia by MPI 8/2016


   EF 15-20% on echo 8/2016


   normal coronaries on [facility] this admission


Hypertension


Hx of Seizure disorder


Hx of Lung and thyroid masses noted on chest CT during last hospitalization.


Hx of Esophageal Stricture.





Recommendations:


Continue amiodarone for suppression.


For AICD insertion today.





Subjective


Date of service: 03/07/17


Principal diagnosis: VT


Interval history: 





No events overnight. Awaits AICD placement today.





Objective


 Vital Signs











  Temp Pulse Pulse Pulse Resp BP BP


 


 03/07/17 09:09      22  


 


 03/07/17 08:44  98.1 F    65  20  


 


 03/07/17 06:33       168/103 


 


 03/07/17 04:45  98.5 F   53 L   18   165/103


 


 03/07/17 00:35  98.3 F   58 L   20   161/96


 


 03/07/17 00:16   57 L     


 


 03/06/17 22:45   57 L     


 


 03/06/17 20:30  98.3 F   58 L   18   148/88


 


 03/06/17 16:51       151/89 


 


 03/06/17 16:49  98.2 F   65   16   151/89


 


 03/06/17 10:27       156/92 


 


 03/06/17 10:00  97.9 F  65  62   14   149/88














  BP Pulse Ox


 


 03/07/17 09:09  


 


 03/07/17 08:44  152/88  93


 


 03/07/17 06:33  


 


 03/07/17 04:45   95


 


 03/07/17 00:35   100


 


 03/07/17 00:16  


 


 03/06/17 22:45  


 


 03/06/17 20:30   98


 


 03/06/17 16:51  


 


 03/06/17 16:49   97


 


 03/06/17 10:27  


 


 03/06/17 10:00   95














- Physical Examination


General: No Apparent Distress


HEENT: Positive: PERRL


Neck: Positive: trachea midline


Cardiac: Positive: Reg Rate and Rhythm


Lungs: Positive: Decreased Breath Sounds


Neuro: Positive: Grossly Intact


Extremities: Absent: edema





- Labs and Meds


 Coagulation











  03/07/17 Range/Units





  07:00 


 


PT  13.9  (12.2-14.9)  Sec.


 


INR  1.08  (0.87-1.13)  








 Comprehensive Metabolic Panel











  03/07/17 Range/Units





  07:00 


 


Sodium  139  (137-145)  mmol/L


 


Potassium  3.8  (3.6-5.0)  mmol/L


 


Chloride  100.6  ()  mmol/L


 


Carbon Dioxide  20 L  (22-30)  mmol/L


 


BUN  14  (9-20)  mg/dL


 


Creatinine  0.8  (0.8-1.5)  mg/dL


 


Glucose  96  ()  mg/dL


 


Calcium  8.1 L  (8.4-10.2)  mg/dL

## 2017-03-07 NOTE — EVENT NOTE
Date: 03/07/17





Pt underwent ICD implant without apparent complications.  If otherwise stable, 

OK to DC tomorrow from cardiac perspective.  At discharge:


1.  Continue to hold eliquis for now (can be restarted as outpateint).


2.  Decrease amiodarone to 200 mg once a day


3.  Prescribe keflex 500 tid x 3 days and percocet 5/325 q 6 hours prn pain (10 

tabs with no refills)


4.  Otherwise, continue current therapy.


5.  F/U with me within 1-2 weeks.





Inderjit Craig MD

## 2017-03-07 NOTE — XRAY REPORT
PORTABLE CHEST



INDICATION: Postop pacemaker.



COMPARISON: 3/1/2017



FINDINGS: Portable, frontal chest radiograph demonstrates poorer 

inspiration with mild exaggerated cardiomediastinal silhouette and 

crowded/increased lung markings with slight right lung haziness, 

possibly atelectatic versus layering fluid. Minimal fluid or thickening 

along the right minor fissure also now noted.  Left sided pacemaker 

with single ventricular lead.  Stable bones.



CONCLUSION: Slight congestion possible radiographically versus 

technical.  Please correlate.



Thank you for the opportunity to participate in this patient's care.

## 2017-03-08 RX ADMIN — OXYCODONE AND ACETAMINOPHEN PRN TAB: 5; 325 TABLET ORAL at 05:34

## 2017-03-08 RX ADMIN — OXYCODONE AND ACETAMINOPHEN PRN TAB: 5; 325 TABLET ORAL at 17:52

## 2017-03-08 RX ADMIN — CEFAZOLIN SCH MLS/HR: 330 INJECTION, POWDER, FOR SOLUTION INTRAMUSCULAR; INTRAVENOUS at 05:29

## 2017-03-08 RX ADMIN — OXYCODONE AND ACETAMINOPHEN PRN TAB: 5; 325 TABLET ORAL at 11:15

## 2017-03-08 RX ADMIN — GUAIFENESIN AND CODEINE PHOSPHATE PRN ML: 100; 10 SOLUTION ORAL at 23:54

## 2017-03-08 RX ADMIN — AMIODARONE HYDROCHLORIDE SCH MG: 200 TABLET ORAL at 21:27

## 2017-03-08 RX ADMIN — METOPROLOL SUCCINATE SCH: 100 TABLET, EXTENDED RELEASE ORAL at 15:10

## 2017-03-08 RX ADMIN — HYDROMORPHONE HYDROCHLORIDE PRN MG: 1 INJECTION, SOLUTION INTRAMUSCULAR; INTRAVENOUS; SUBCUTANEOUS at 20:06

## 2017-03-08 RX ADMIN — LISINOPRIL SCH MG: 20 TABLET ORAL at 12:53

## 2017-03-08 RX ADMIN — METOPROLOL TARTRATE SCH MG: 50 TABLET, FILM COATED ORAL at 09:47

## 2017-03-08 RX ADMIN — DEXTROSE AND SODIUM CHLORIDE SCH MLS/HR: 5; .9 INJECTION, SOLUTION INTRAVENOUS at 17:52

## 2017-03-08 RX ADMIN — ONDANSETRON PRN MG: 2 INJECTION INTRAMUSCULAR; INTRAVENOUS at 20:09

## 2017-03-08 RX ADMIN — SPIRONOLACTONE SCH MG: 25 TABLET ORAL at 12:53

## 2017-03-08 RX ADMIN — DEXTROSE AND SODIUM CHLORIDE SCH MLS/HR: 5; .9 INJECTION, SOLUTION INTRAVENOUS at 05:30

## 2017-03-08 RX ADMIN — LISINOPRIL SCH MG: 5 TABLET ORAL at 09:42

## 2017-03-08 RX ADMIN — BUTALBITAL, ACETAMINOPHEN, AND CAFFEINE PRN TAB: 50; 325; 40 TABLET ORAL at 23:54

## 2017-03-08 RX ADMIN — AMIODARONE HYDROCHLORIDE SCH MG: 200 TABLET ORAL at 09:42

## 2017-03-08 RX ADMIN — ZOLPIDEM TARTRATE PRN MG: 5 TABLET ORAL at 23:54

## 2017-03-08 RX ADMIN — PANTOPRAZOLE SODIUM SCH MG: 40 TABLET, DELAYED RELEASE ORAL at 09:43

## 2017-03-08 RX ADMIN — ACETAMINOPHEN PRN MG: 325 TABLET ORAL at 09:43

## 2017-03-08 NOTE — PROGRESS NOTE
Assessment and Plan





Unstable Ventricular tachycardia


   terminated by cardioversion


   s/p single chamber AICD implant (Biotronik)


Hx of paroxysmal atrial flutter


   on eliquis as an outpatient, currently on hold


Hx of dilated cardiomyopathy


   no ischemia by MPI 8/2016


   EF 15-20% on echo 8/2016


   normal coronaries on [facility] this admission


Hypertension


Hx of Seizure disorder


Hx of Lung and thyroid masses noted on chest CT during last hospitalization.


Hx of Esophageal Stricture.





Recommendations:


OK to discharge from cardiac perspective.  


At discharge:


Continue to hold eliquis for now (can be restarted as outpateint).


Decrease amiodarone to 200 mg once a day


Prescribe keflex 500 tid x 3 days and percocet 5/325 q 6 hours prn pain (10 

tabs with no refills)


F/U with Dr Craig March 21st at 1030a once discharged.





Subjective


Date of service: 03/08/17


Principal diagnosis: VT


Interval history: 





s/p AICD insertion 3/7. No drainage or edema noted. Normal function on 

interrogation this morning.





Objective


 Vital Signs











  Temp Pulse Pulse Pulse Resp BP BP


 


 03/08/17 08:45  98.4 F    62  20   184/109


 


 03/08/17 05:52  98.6 F   59 L   20  181/101 


 


 03/08/17 01:31  98.8 F   56 L   18  153/94 


 


 03/07/17 23:25   65     


 


 03/07/17 22:00   68     


 


 03/07/17 21:29  98.1 F   61   18  157/90 


 


 03/07/17 19:19      20  


 


 03/07/17 16:15  98.9 F    53 L  20   158/95


 


 03/07/17 11:11   67     


 


 03/07/17 11:08   62     


 


 03/07/17 10:00   67     


 


 03/07/17 09:09      22  














  Pulse Ox


 


 03/08/17 08:45  95


 


 03/08/17 05:52  96


 


 03/08/17 01:31  96


 


 03/07/17 23:25 


 


 03/07/17 22:00 


 


 03/07/17 21:29  98


 


 03/07/17 19:19 


 


 03/07/17 16:15  96


 


 03/07/17 11:11 


 


 03/07/17 11:08 


 


 03/07/17 10:00  97


 


 03/07/17 09:09 














- Physical Examination


General: No Apparent Distress


HEENT: Positive: PERRL


Neck: Positive: trachea midline


Neuro: Positive: Grossly Intact


Abdomen: Positive: Soft, Other (NONTENDER)


Extremities: Absent: edema
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
## 2017-03-09 LAB
HCT VFR BLD CALC: 33.5 % (ref 35.5–45.6)
HGB BLD-MCNC: 11.1 GM/DL (ref 11.8–15.2)
INR PPP: 1.14 (ref 0.87–1.13)
MCH RBC QN AUTO: 29 PG (ref 28–32)
MCHC RBC AUTO-ENTMCNC: 33 % (ref 32–34)
MCV RBC AUTO: 89 FL (ref 84–94)
PLATELET # BLD: 157 K/MM3 (ref 140–440)
RBC # BLD AUTO: 3.77 M/MM3 (ref 3.65–5.03)
WBC # BLD AUTO: 5 K/MM3 (ref 4.5–11)

## 2017-03-09 PROCEDURE — 0DB28ZX EXCISION OF MIDDLE ESOPHAGUS, VIA NATURAL OR ARTIFICIAL OPENING ENDOSCOPIC, DIAGNOSTIC: ICD-10-PCS | Performed by: HOSPITALIST

## 2017-03-09 PROCEDURE — 0D738ZZ DILATION OF LOWER ESOPHAGUS, VIA NATURAL OR ARTIFICIAL OPENING ENDOSCOPIC: ICD-10-PCS | Performed by: HOSPITALIST

## 2017-03-09 RX ADMIN — HYDROMORPHONE HYDROCHLORIDE PRN MG: 1 INJECTION, SOLUTION INTRAMUSCULAR; INTRAVENOUS; SUBCUTANEOUS at 21:44

## 2017-03-09 RX ADMIN — HYDROMORPHONE HYDROCHLORIDE PRN MG: 1 INJECTION, SOLUTION INTRAMUSCULAR; INTRAVENOUS; SUBCUTANEOUS at 10:16

## 2017-03-09 RX ADMIN — AMIODARONE HYDROCHLORIDE SCH MG: 200 TABLET ORAL at 21:45

## 2017-03-09 RX ADMIN — FUROSEMIDE SCH MG: 20 TABLET ORAL at 10:18

## 2017-03-09 RX ADMIN — METOPROLOL SUCCINATE SCH MG: 100 TABLET, EXTENDED RELEASE ORAL at 10:18

## 2017-03-09 RX ADMIN — BUTALBITAL, ACETAMINOPHEN, AND CAFFEINE PRN TAB: 50; 325; 40 TABLET ORAL at 21:45

## 2017-03-09 RX ADMIN — PANTOPRAZOLE SODIUM SCH MG: 40 TABLET, DELAYED RELEASE ORAL at 10:18

## 2017-03-09 RX ADMIN — SPIRONOLACTONE SCH MG: 25 TABLET ORAL at 10:19

## 2017-03-09 RX ADMIN — ZOLPIDEM TARTRATE PRN MG: 5 TABLET ORAL at 21:45

## 2017-03-09 RX ADMIN — AMIODARONE HYDROCHLORIDE SCH MG: 200 TABLET ORAL at 10:20

## 2017-03-09 RX ADMIN — LISINOPRIL SCH MG: 20 TABLET ORAL at 10:19

## 2017-03-09 RX ADMIN — GUAIFENESIN AND CODEINE PHOSPHATE PRN ML: 100; 10 SOLUTION ORAL at 21:46

## 2017-03-09 RX ADMIN — DEXTROSE AND SODIUM CHLORIDE SCH MLS/HR: 5; .9 INJECTION, SOLUTION INTRAVENOUS at 10:32

## 2017-03-09 NOTE — ANESTHESIA CONSULTATION
Anesthesia Consult and Med Hx


Date of service: 03/09/17





- Airway


Anesthetic Teeth Evaluation: Good


ROM Head & Neck: Adequate


Mental/Hyoid Distance: Adequate


Mallampati Class: Class II


Intubation Access Assessment: Probably Good





- Pulmonary Exam


CTA: Yes





- Cardiac Exam


Cardiac Exam: RRR





- Pre-Operative Health Status


ASA Pre-Surgery Classification: ASA4


Proposed Anesthetic Plan: MAC





- Pulmonary


Hx Smoking: No


Hx Asthma: Yes


COPD: No


Hx Pneumonia: No





- Cardiovascular System


Hx Hypertension: Yes (nonischemic cardiomyopathy, EF 15-20%)


Hx Coronary Artery Disease: No


Hx Cardia Arrhythmia: Yes (paroxysmal AFIB)


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


Hx Valvular Heart Disease: Yes (mild to mod MR)





- Central Nervous System


Hx Seizures: Yes (last months ago per patient, not on meds)


CVA: No





- Endocrine


Hx Renal Disease: No


Hx End Stage Renal Disease: No


Hx Insulin Dependent Diabetes: No


Hx Thyroid Disease: Yes (thyroid mass on CT)

## 2017-03-09 NOTE — POST OPERATIVE NOTE
Pre-op diagnosis: dysphagia


Post-op diagnosis: other (mis esophageal raised plaque lesion, no significant 

esophageal stricture visualized)


Findings: 


Esophagus: raised plaque lesion in mid esophagus of unclear etiology.  biopsies 

obtained.  No obvious significant stricture, balloon dilatation was performed 

in the lower third of the esophagus/GE junction to 18-19mm.  





Stomach and duodenum appeared normal





Procedure: 


EGD with balloon dilatation





Anesthesia: MAC


Surgeon: LINDA PAK


Estimated blood loss: minimal


Pathology: list (mid esophageal lesion biopsy)


Specimen disposition: to lab


Condition: stable


Disposition: floor

## 2017-03-09 NOTE — OPERATIVE REPORT
PROCEDURE:  EGD.



PREOPERATIVE DIAGNOSIS:  Dysphagia.



POSTOPERATIVE DIAGNOSES:  Minimally raised plaque appearing lesion in mid-
esophagus of unclear

etiology, otherwise no obvious etiology of dysphagia seen.  Empiric balloon 
dilatation

performed up to 19 mm.



ANESTHESIA:  Monitored anesthesia care.



ESTIMATED BLOOD LOSS:  Minimal.



COMPLICATIONS:  No immediate complications.



DESCRIPTION OF PROCEDURE:  After consent was obtained, the patient was placed in

left lateral decubitus position.  The standard upper Fujinon scope was advanced

with direct vision through the mouth into the second portion of duodenum without

difficulty.  The patient tolerated the procedure well.  The views of the mucosa

were good.



FINDINGS:  

There is a minimally raised plaque lesion in the mid esophagus (approximately 5-
7 mm)

of unclear etiology.  Biopsies were obtained for histology.  The Z line was

irregular at 44 cm.  No obvious stricture or stenosis, empiric balloon 
dilatation was performed in the lower third of the esophagus/GE junction (18-19 
mm balloon)



There were small amounts of retained liquid within the stomach that was 
suctioned; otherwise, the stomach appeared normal.



Duodenum appeared normal.  



IMPRESSION:

1.  Mid esophageal lesion as described above of unclear etiology/significance.  
Biopsies were obtained.

2.  No obvious significant stricture or obstruction seen, empiric balloon 
dilatation was performed as above.



RECOMMENDATIONS:

1.  Followup pathology.

2.  Continue PPI daily before breakfast given reflux symptoms.

3.  Follow up in GI clinic in 2 weeks.  If dysphagia has not improved, consider 
manometry.  May need repeat EGD as well if pathology of esophageal lesion is non
-diagnostic. 





DD: 03/09/2017 13:14

DT: 03/09/2017 13:48

[facility]# [id number]  [id number]

ANK[employer]
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
## 2017-03-09 NOTE — ANESTHESIA DAY OF SURGERY
Anesthesia Day of Surgery





- Day of Surgery


Patient Examined: Yes


Patient H&P Reviewed: Yes


Patient is NPO: Yes


Beta Blockers: Yes


Cardiac Clearance: Yes

## 2017-03-10 VITALS — SYSTOLIC BLOOD PRESSURE: 140 MMHG | DIASTOLIC BLOOD PRESSURE: 84 MMHG

## 2017-03-10 RX ADMIN — HYDROMORPHONE HYDROCHLORIDE PRN MG: 1 INJECTION, SOLUTION INTRAMUSCULAR; INTRAVENOUS; SUBCUTANEOUS at 04:10

## 2017-03-10 RX ADMIN — METOPROLOL SUCCINATE SCH MG: 100 TABLET, EXTENDED RELEASE ORAL at 09:53

## 2017-03-10 RX ADMIN — METOPROLOL TARTRATE SCH: 50 TABLET, FILM COATED ORAL at 07:46

## 2017-03-10 RX ADMIN — LISINOPRIL SCH MG: 20 TABLET ORAL at 09:53

## 2017-03-10 RX ADMIN — PANTOPRAZOLE SODIUM SCH MG: 40 TABLET, DELAYED RELEASE ORAL at 09:53

## 2017-03-10 RX ADMIN — AMIODARONE HYDROCHLORIDE SCH MG: 200 TABLET ORAL at 09:54

## 2017-03-10 RX ADMIN — FUROSEMIDE SCH MG: 20 TABLET ORAL at 09:53

## 2017-03-10 RX ADMIN — DEXTROSE AND SODIUM CHLORIDE SCH MLS/HR: 5; .9 INJECTION, SOLUTION INTRAVENOUS at 04:11

## 2017-03-10 RX ADMIN — HYDROMORPHONE HYDROCHLORIDE PRN MG: 1 INJECTION, SOLUTION INTRAMUSCULAR; INTRAVENOUS; SUBCUTANEOUS at 09:55

## 2017-03-10 RX ADMIN — SPIRONOLACTONE SCH MG: 25 TABLET ORAL at 09:54

## 2017-03-10 NOTE — DISCHARGE SUMMARY
Providers





- Providers


Date of Admission: 


03/01/17 22:16





Date of discharge: 03/10/17


Attending physician: 


CHINA MILLER





 





03/01/17


Consult to Cardiac Rehabilitation [CONS] Routine 


   Reason For Exam: Phase I





03/03/17 13:22


Consult to Physician [CONS] Routine 


   Consulting Provider: UNRULY CRUMP


   Reason For Exam: dysphagia ?h/o esophageal stricture


   Place consult to:: Heidi anne


   Notified:: office


   Phone number called:: [phone]


   Was contact made?: Yes


   If yes, spoke with:: pat


   Time called:: 14:08





03/06/17 09:18


Consult to Cardiac Rehabilitation [CONS] Routine 


   Reason For Exam: Cardiac Rehab Evaluation





03/07/17 15:24


Consult to Cardiac Rehabilitation [CONS] Routine 


   Reason For Exam: Cardiac Rehab Evaluation











Primary care physician: 


PRIMARY CARE MD








Hospitalization


Condition: Serious


Disposition: DC/TX HOME UNDER HOME HEALTH


Time spent for discharge: 35 min





Core Measure Documentation





- Palliative Care


Palliative Care/ Comfort Measures: Not Applicable





- Core Measures


Any of the following diagnoses?: heart failure





- Heart Failure Discharge Requirements


ACE/ARB for LVSD if EF <40%: Yes


Beta blocker at discharge: Yes





Exam





- Constitutional


Vitals: 


 











Temp Pulse Resp BP Pulse Ox


 


 98.8 F   56 L  18   140/84   95 


 


 03/10/17 09:08  03/10/17 09:08  03/10/17 09:08  03/10/17 09:08  03/10/17 09:08














Plan


Activity: advance as tolerated, fall precautions


Diet: low cholesterol, low salt


Follow up with: 


[facility] [Provider Group] - 7 Days


PRIMARY CARE,MD [Primary Care Provider] - 7 Days


ADELINE COFFEY MD [Staff Physician] - 14 Days


LINDA PAK MD [Staff Physician] - 14 Days


Prescriptions: 


Amiodarone [Cordarone 200 MG TAB] 200 mg PO BID #60 tablet


Furosemide [Lasix TAB] 20 mg PO QDAY #30 tablet


Lisinopril [Zestril TAB] 20 mg PO QDAY #30 tablet


Metoprolol Xl [Metoprolol SUCCINATE ER TAB] 100 mg PO QDAY #30 tablet


oxyCODONE /ACETAMINOPHEN [Percocet 5/325 mg] 1 tab PO Q6H PRN #10 tablet


 PRN Reason: Pain, Moderate (4-6)


Pantoprazole [Protonix TAB] 20 mg PO QDAY #30 tablet.


Spironolactone [Aldactone] 25 mg PO QDAY #30 tablet

## 2017-08-22 ENCOUNTER — HOSPITAL ENCOUNTER (EMERGENCY)
Dept: HOSPITAL 5 - ED | Age: 57
Discharge: LEFT BEFORE DISCHARGE | End: 2017-08-22
Payer: MEDICARE

## 2017-08-22 VITALS — SYSTOLIC BLOOD PRESSURE: 128 MMHG | DIASTOLIC BLOOD PRESSURE: 95 MMHG

## 2017-08-22 DIAGNOSIS — R07.9: Primary | ICD-10-CM

## 2017-08-22 DIAGNOSIS — M54.9: ICD-10-CM

## 2017-08-22 DIAGNOSIS — Z53.21: ICD-10-CM

## 2017-08-22 LAB
ANION GAP SERPL CALC-SCNC: 26 MMOL/L
APTT BLD: 30.9 SEC. (ref 24.2–36.6)
BASOPHILS NFR BLD AUTO: 1 % (ref 0–1.8)
BUN SERPL-MCNC: 25 MG/DL (ref 9–20)
BUN/CREAT SERPL: 31.25 %
CALCIUM SERPL-MCNC: 9.1 MG/DL (ref 8.4–10.2)
CHLORIDE SERPL-SCNC: 102.7 MMOL/L (ref 98–107)
CO2 SERPL-SCNC: 21 MMOL/L (ref 22–30)
EOSINOPHIL NFR BLD AUTO: 1.9 % (ref 0–4.3)
GLUCOSE SERPL-MCNC: 97 MG/DL (ref 75–100)
HCT VFR BLD CALC: 39 % (ref 35.5–45.6)
HGB BLD-MCNC: 13 GM/DL (ref 11.8–15.2)
INR PPP: 0.95 (ref 0.87–1.13)
MCH RBC QN AUTO: 30 PG (ref 28–32)
MCHC RBC AUTO-ENTMCNC: 33 % (ref 32–34)
MCV RBC AUTO: 91 FL (ref 84–94)
PLATELET # BLD: 134 K/MM3 (ref 140–440)
POTASSIUM SERPL-SCNC: 4.1 MMOL/L (ref 3.6–5)
RBC # BLD AUTO: 4.3 M/MM3 (ref 3.65–5.03)
SODIUM SERPL-SCNC: 146 MMOL/L (ref 137–145)
WBC # BLD AUTO: 4 K/MM3 (ref 4.5–11)

## 2017-08-22 PROCEDURE — 84484 ASSAY OF TROPONIN QUANT: CPT

## 2017-08-22 PROCEDURE — 93010 ELECTROCARDIOGRAM REPORT: CPT

## 2017-08-22 PROCEDURE — 85610 PROTHROMBIN TIME: CPT

## 2017-08-22 PROCEDURE — 85730 THROMBOPLASTIN TIME PARTIAL: CPT

## 2017-08-22 PROCEDURE — 93005 ELECTROCARDIOGRAM TRACING: CPT

## 2017-08-22 PROCEDURE — 80048 BASIC METABOLIC PNL TOTAL CA: CPT

## 2017-08-22 PROCEDURE — 36415 COLL VENOUS BLD VENIPUNCTURE: CPT

## 2017-08-22 PROCEDURE — 85025 COMPLETE CBC W/AUTO DIFF WBC: CPT

## 2018-05-13 ENCOUNTER — HOSPITAL ENCOUNTER (EMERGENCY)
Dept: HOSPITAL 5 - ED | Age: 58
Discharge: LEFT BEFORE DISCHARGE | End: 2018-05-13
Payer: MEDICARE

## 2018-05-13 VITALS — SYSTOLIC BLOOD PRESSURE: 115 MMHG | DIASTOLIC BLOOD PRESSURE: 67 MMHG

## 2018-05-13 DIAGNOSIS — Z95.810: ICD-10-CM

## 2018-05-13 DIAGNOSIS — M19.90: ICD-10-CM

## 2018-05-13 DIAGNOSIS — R07.89: Primary | ICD-10-CM

## 2018-05-13 DIAGNOSIS — I10: ICD-10-CM

## 2018-05-13 DIAGNOSIS — J45.909: ICD-10-CM

## 2018-05-13 LAB
ALBUMIN SERPL-MCNC: 4.1 G/DL (ref 3.9–5)
ALT SERPL-CCNC: 15 UNITS/L (ref 7–56)
BASOPHILS # (AUTO): 0.1 K/MM3 (ref 0–0.1)
BASOPHILS NFR BLD AUTO: 1.3 % (ref 0–1.8)
BUN SERPL-MCNC: 26 MG/DL (ref 9–20)
BUN/CREAT SERPL: 26 %
CALCIUM SERPL-MCNC: 8.8 MG/DL (ref 8.4–10.2)
EOSINOPHIL # BLD AUTO: 0 K/MM3 (ref 0–0.4)
EOSINOPHIL NFR BLD AUTO: 0.4 % (ref 0–4.3)
HCT VFR BLD CALC: 37.9 % (ref 35.5–45.6)
HEMOLYSIS INDEX: 7
HGB BLD-MCNC: 12.8 GM/DL (ref 11.8–15.2)
LYMPHOCYTES # BLD AUTO: 1.3 K/MM3 (ref 1.2–5.4)
LYMPHOCYTES NFR BLD AUTO: 19.8 % (ref 13.4–35)
MCH RBC QN AUTO: 29 PG (ref 28–32)
MCHC RBC AUTO-ENTMCNC: 34 % (ref 32–34)
MCV RBC AUTO: 86 FL (ref 84–94)
MONOCYTES # (AUTO): 0.4 K/MM3 (ref 0–0.8)
MONOCYTES % (AUTO): 5.4 % (ref 0–7.3)
PLATELET # BLD: 194 K/MM3 (ref 140–440)
RBC # BLD AUTO: 4.38 M/MM3 (ref 3.65–5.03)

## 2018-05-13 PROCEDURE — 84484 ASSAY OF TROPONIN QUANT: CPT

## 2018-05-13 PROCEDURE — 93010 ELECTROCARDIOGRAM REPORT: CPT

## 2018-05-13 PROCEDURE — 71045 X-RAY EXAM CHEST 1 VIEW: CPT

## 2018-05-13 PROCEDURE — 36415 COLL VENOUS BLD VENIPUNCTURE: CPT

## 2018-05-13 PROCEDURE — 93005 ELECTROCARDIOGRAM TRACING: CPT

## 2018-05-13 PROCEDURE — 80053 COMPREHEN METABOLIC PANEL: CPT

## 2018-05-13 PROCEDURE — 85025 COMPLETE CBC W/AUTO DIFF WBC: CPT

## 2018-05-13 NOTE — XRAY REPORT
FINAL REPORT



EXAM:  XR CHEST 1V AP



HISTORY:  Chest Pain 



COMPARISON:  CT angiogram of the chest performed [id number]



TECHNIQUE:  Two frontal views of the chest



FINDINGS:  

Left-sided defibrillator is unchanged in position.



The cardiomediastinal silhouette is normal in appearance.



The lungs are clear without focal consolidation. No pleural

effusion or pneumothorax.



No acute bony or soft tissue.



IMPRESSION:  

No acute cardiopulmonary disease.
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
## 2019-11-03 ENCOUNTER — HOSPITAL ENCOUNTER (INPATIENT)
Dept: HOSPITAL 5 - ED | Age: 59
LOS: 4 days | Discharge: HOME | DRG: 378 | End: 2019-11-07
Attending: INTERNAL MEDICINE | Admitting: INTERNAL MEDICINE
Payer: MEDICARE

## 2019-11-03 DIAGNOSIS — I48.0: ICD-10-CM

## 2019-11-03 DIAGNOSIS — Z79.899: ICD-10-CM

## 2019-11-03 DIAGNOSIS — I50.9: ICD-10-CM

## 2019-11-03 DIAGNOSIS — M19.90: ICD-10-CM

## 2019-11-03 DIAGNOSIS — I42.9: ICD-10-CM

## 2019-11-03 DIAGNOSIS — K29.70: ICD-10-CM

## 2019-11-03 DIAGNOSIS — E87.6: ICD-10-CM

## 2019-11-03 DIAGNOSIS — I11.0: ICD-10-CM

## 2019-11-03 DIAGNOSIS — Z79.82: ICD-10-CM

## 2019-11-03 DIAGNOSIS — K44.9: ICD-10-CM

## 2019-11-03 DIAGNOSIS — K21.9: ICD-10-CM

## 2019-11-03 DIAGNOSIS — D62: ICD-10-CM

## 2019-11-03 DIAGNOSIS — K27.4: Primary | ICD-10-CM

## 2019-11-03 DIAGNOSIS — G43.909: ICD-10-CM

## 2019-11-03 DIAGNOSIS — E83.42: ICD-10-CM

## 2019-11-03 DIAGNOSIS — I47.1: ICD-10-CM

## 2019-11-03 DIAGNOSIS — J45.909: ICD-10-CM

## 2019-11-03 DIAGNOSIS — Z95.810: ICD-10-CM

## 2019-11-03 LAB
ALBUMIN SERPL-MCNC: 4.9 G/DL (ref 3.9–5)
ALT SERPL-CCNC: 95 UNITS/L (ref 7–56)
BASOPHILS # (AUTO): 0 K/MM3 (ref 0–0.1)
BASOPHILS NFR BLD AUTO: 0.4 % (ref 0–1.8)
BUN SERPL-MCNC: 33 MG/DL (ref 9–20)
BUN/CREAT SERPL: 22 %
CALCIUM SERPL-MCNC: 9 MG/DL (ref 8.4–10.2)
EOSINOPHIL # BLD AUTO: 0 K/MM3 (ref 0–0.4)
EOSINOPHIL NFR BLD AUTO: 0 % (ref 0–4.3)
HCT VFR BLD CALC: 34.1 % (ref 35.5–45.6)
HEMOLYSIS INDEX: 20
HGB BLD-MCNC: 11.5 GM/DL (ref 11.8–15.2)
LYMPHOCYTES # BLD AUTO: 0.8 K/MM3 (ref 1.2–5.4)
LYMPHOCYTES NFR BLD AUTO: 14 % (ref 13.4–35)
MCHC RBC AUTO-ENTMCNC: 34 % (ref 32–34)
MCV RBC AUTO: 88 FL (ref 84–94)
MONOCYTES # (AUTO): 0.6 K/MM3 (ref 0–0.8)
MONOCYTES % (AUTO): 10.5 % (ref 0–7.3)
PLATELET # BLD: 103 K/MM3 (ref 140–440)
RBC # BLD AUTO: 3.88 M/MM3 (ref 3.65–5.03)

## 2019-11-03 PROCEDURE — 83735 ASSAY OF MAGNESIUM: CPT

## 2019-11-03 PROCEDURE — 93005 ELECTROCARDIOGRAM TRACING: CPT

## 2019-11-03 PROCEDURE — 80061 LIPID PANEL: CPT

## 2019-11-03 PROCEDURE — 85610 PROTHROMBIN TIME: CPT

## 2019-11-03 PROCEDURE — 96374 THER/PROPH/DIAG INJ IV PUSH: CPT

## 2019-11-03 PROCEDURE — 85025 COMPLETE CBC W/AUTO DIFF WBC: CPT

## 2019-11-03 PROCEDURE — 86850 RBC ANTIBODY SCREEN: CPT

## 2019-11-03 PROCEDURE — 86900 BLOOD TYPING SEROLOGIC ABO: CPT

## 2019-11-03 PROCEDURE — 36415 COLL VENOUS BLD VENIPUNCTURE: CPT

## 2019-11-03 PROCEDURE — 85027 COMPLETE CBC AUTOMATED: CPT

## 2019-11-03 PROCEDURE — 81001 URINALYSIS AUTO W/SCOPE: CPT

## 2019-11-03 PROCEDURE — 84443 ASSAY THYROID STIM HORMONE: CPT

## 2019-11-03 PROCEDURE — 80048 BASIC METABOLIC PNL TOTAL CA: CPT

## 2019-11-03 PROCEDURE — 85018 HEMOGLOBIN: CPT

## 2019-11-03 PROCEDURE — 85730 THROMBOPLASTIN TIME PARTIAL: CPT

## 2019-11-03 PROCEDURE — 86901 BLOOD TYPING SEROLOGIC RH(D): CPT

## 2019-11-03 PROCEDURE — 85014 HEMATOCRIT: CPT

## 2019-11-03 PROCEDURE — C9113 INJ PANTOPRAZOLE SODIUM, VIA: HCPCS

## 2019-11-03 PROCEDURE — 83690 ASSAY OF LIPASE: CPT

## 2019-11-03 PROCEDURE — 93010 ELECTROCARDIOGRAM REPORT: CPT

## 2019-11-03 PROCEDURE — 82271 OCCULT BLOOD OTHER SOURCES: CPT

## 2019-11-03 PROCEDURE — 80053 COMPREHEN METABOLIC PANEL: CPT

## 2019-11-03 NOTE — EMERGENCY DEPARTMENT REPORT
HPI





- General


Chief Complaint: Nausea/Vomiting/Diarrhea


Time Seen by Provider: 11/03/19 20:57





- HPI


HPI: 





Room 26








The patient is a 59-year-old male presenting with a chief complaint of 

hematemesis.  The patient states for the past 2-3 days she's been vomiting 

blood.  The patient states it initially began as bright red blood but today it 

appeared like coffee grounds.  Patient states he's also had melena for the past 

2-3 days.  Patient complains lower abdominal pain.  Patient denies NSAID abuse. 

Patient also complains of a headache








Location: [See above]


Duration: [See above]


Quality: [See above]


Severity: [See above]


Timing: [See above]


Context: [See above]


Modifying factors: [See above]


Associated signs and symptoms: [see above]








ED Past Medical Hx





- Past Medical History


Previous Medical History?: Yes


Hx Hypertension: Yes (nonischemic cardiomyopathy, EF 15-20%)


Hx Arthritis: Yes


Hx Seizures: Yes (last months ago per patient, not on meds)


Hx Asthma: Yes


Additional medical history: VTACH, Defibrillator, Head Trauma, Concussions





- Surgical History


Past Surgical History?: Yes


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


Additional Surgical History: JAW, KNEE, HAND





- Family History


Family history: no significant





- Social History


Smoking Status: Never Smoker


Substance Use Type: None





- Medications


Home Medications: 


                                Home Medications











 Medication  Instructions  Recorded  Confirmed  Last Taken  Type


 


Gabapentin PO QHS 12/22/18 12/22/18 History


 


Metoprolol Xl [Metoprolol 100 mg PO BID 12/22/18 12/22/18 12/21/18 History





SUCCINATE ER TAB]     


 


NIFEdipine [Nifedipine ER] 60 mg PO 12/22/18 12/21/18 History


 


traZODone [Desyrel] 50 mg PO QHS 12/22/18 12/22/18 12/21/18 History


 


Amiodarone HCl [Amiodarone 100  mg PO DAILY 11/04/19 11/04/19 Unknown 

History





TAB]     


 


Apixaban [Eliquis] 5 mg PO DAILY 11/04/19 11/04/19 Unknown History


 


Aspirin [Adult Aspirin] 81 mg PO DAILY 11/04/19 11/04/19 Unknown History


 


Calcium Carb/Vit D3/Minerals 1 each PO DAILY 11/04/19 11/04/19 Unknown History





[Caltrate Plus]     


 


Cyanocobalamin [Vitamin B-12] 1,000 mcg PO DAILY 11/04/19 11/04/19 Unknown 

History


 


Isosorb Dinit/Hydralazine [Bidil 20 mg PO BID 11/04/19 11/04/19 Unknown History





20/37.5MG]     


 


Lisinopril [Zestril TAB] 10 mg PO QDAY 11/04/19 11/04/19 Unknown History


 


Prochlorperazine [Compazine] 10 mg PO Q6HR 11/04/19 11/04/19 Unknown History


 


Thiamine [Vitamin B-1] 100 mg PO QDAY 11/04/19 11/04/19 Unknown History














ED Review of Systems


ROS: 


Stated complaint: FLU LIKE SYMPTOMS


Other details as noted in HPI





Constitutional: no symptoms reported


Eyes: denies: eye pain


ENT: denies: throat pain


Respiratory: no symptoms reported


Cardiovascular: denies: chest pain


Endocrine: no symptoms reported


Gastrointestinal: abdominal pain, nausea, vomiting, hematemesis, melena


Genitourinary: denies: dysuria


Musculoskeletal: denies: back pain


Neurological: headache





Physical Exam





- Physical Exam


Vital Signs: 


                                   Vital Signs











  11/03/19





  20:46


 


Temperature 98.8 F


 


Pulse Rate 73


 


Respiratory 18





Rate 


 


Blood Pressure 173/85


 


O2 Sat by Pulse 98





Oximetry 











Physical Exam: 





GENERAL: The patient is well-developed well-nourished male lying on stretcher 

appearing to be in mild discomfort. []


HEENT: Normocephalic.  Atraumatic.  Extraocular motions are intact.  Patient has

 moist mucous membranes.


NECK: Supple.  Trachea midline


CHEST/LUNGS: Clear to auscultation.  There is no respiratory distress noted.


HEART/CARDIOVASCULAR: Regular.  There is no tachycardia.  There is no gallop rub

 or murmur.


ABDOMEN: Abdomen is soft, with diffuse mild discomfort to palpation.  No 

rebound.  Patient has normal bowel sounds.  There is no abdominal distention.


SKIN: There is no rash.  There is no edema.  There is no diaphoresis.


NEURO: The patient is awake, alert, and oriented.  The patient is cooperative.  

The patient has normal speech


MUSCULOSKELETAL:  There is no evidence of acute injury.


RECTAL: Guaiac Positive, dark stool











ED Course


                                   Vital Signs











  11/03/19





  20:46


 


Temperature 98.8 F


 


Pulse Rate 73


 


Respiratory 18





Rate 


 


Blood Pressure 173/85


 


O2 Sat by Pulse 98





Oximetry 














- Consultations


Consultation #1: 





11/04/19 01:00


GI paged


11/04/19 02:09


Case discussed with Dr. Campo- keep patient nothing by mouth.  Recommend 

Protonix drip





ED Medical Decision Making





- Lab Data


Result diagrams: 


                                 11/03/19 21:12





                                 11/03/19 21:12





                                Laboratory Tests











  11/03/19 11/03/19 11/03/19





  00:00 21:12 21:12


 


WBC   5.5 


 


RBC   3.88 


 


Hgb   11.5 L 


 


Hct   34.1 L 


 


MCV   88 


 


MCH   30 


 


MCHC   34 


 


RDW   16.6 H 


 


Plt Count   103 L 


 


Lymph % (Auto)   14.0 


 


Mono % (Auto)   10.5 H 


 


Eos % (Auto)   0.0 


 


Baso % (Auto)   0.4 


 


Lymph #   0.8 L 


 


Mono #   0.6 


 


Eos #   0.0 


 


Baso #   0.0 


 


Seg Neutrophils %   75.1 H 


 


Seg Neutrophils #   4.2 


 


PT   


 


INR   


 


APTT   


 


Sodium    132 L


 


Potassium    3.1 L


 


Chloride    73.3 L


 


Carbon Dioxide    23


 


Anion Gap    39


 


BUN    33 H


 


Creatinine    1.5


 


Estimated GFR    58


 


BUN/Creatinine Ratio    22


 


Glucose    97


 


Calcium    9.0


 


Total Bilirubin    0.90


 


AST    167 H


 


ALT    95 H


 


Alkaline Phosphatase    81


 


Total Protein    7.7


 


Albumin    4.9


 


Albumin/Globulin Ratio    1.8


 


Lipase    25


 


Blood Type  O POSITIVE  














  11/04/19





  00:00


 


WBC 


 


RBC 


 


Hgb 


 


Hct 


 


MCV 


 


MCH 


 


MCHC 


 


RDW 


 


Plt Count 


 


Lymph % (Auto) 


 


Mono % (Auto) 


 


Eos % (Auto) 


 


Baso % (Auto) 


 


Lymph # 


 


Mono # 


 


Eos # 


 


Baso # 


 


Seg Neutrophils % 


 


Seg Neutrophils # 


 


PT  12.8


 


INR  0.97


 


APTT  27.0


 


Sodium 


 


Potassium 


 


Chloride 


 


Carbon Dioxide 


 


Anion Gap 


 


BUN 


 


Creatinine 


 


Estimated GFR 


 


BUN/Creatinine Ratio 


 


Glucose 


 


Calcium 


 


Total Bilirubin 


 


AST 


 


ALT 


 


Alkaline Phosphatase 


 


Total Protein 


 


Albumin 


 


Albumin/Globulin Ratio 


 


Lipase 


 


Blood Type 














- EKG Data


-: EKG Interpreted by Me


Rate: tachycardia (120 bpm)





- EKG Data


When compared to previous EKG there are: previous EKG unavailable


Interpretation: other (patient fibrillation with a rapid ventricular response)





- Differential Diagnosis


GI bleed


Critical care attestation.: 


If time is entered above; I have spent that time in minutes in the direct care 

of this critically ill patient, excluding procedure time.








ED Disposition


Clinical Impression: 


 Upper GI bleed, Abdominal pain, Atrial fibrillation with rapid ventricular 

response





Disposition: DC-09 OP ADMIT IP TO THIS HOSP


Is pt being admited?: Yes


Does the pt Need Aspirin: No


Condition: Fair


Referrals: 


PRIMARY CARE,MD [Primary Care Provider] - 3-5 Days


Time of Disposition: 02:11 (hospitalist paged (Dr Sorensen))

## 2019-11-03 NOTE — EMERGENCY DEPARTMENT REPORT
Blank Doc





- Documentation


Documentation: 





59-year-old male that presents with dizziness, n/v, and headache.





This initial assessment/diagnostic orders/clinical plan/treatment(s) is/are 

subject to change based on patient's health status, clinical progression and re-

assessment by fellow clinical providers in the ED.  Further treatment and workup

at subsequent clinical providers discretion.  Patient/guardians urged not to 

elope from the ED as their condition may be serious if not clinically assessed 

and managed.  Initial orders include:


1- Patient sent to [facility] for further evaluation and treatment


2- labs


3- EKG


4- UA

## 2019-11-04 LAB
APTT BLD: 27 SEC. (ref 24.2–36.6)
BILIRUB UR QL STRIP: (no result)
BLOOD UR QL VISUAL: (no result)
HCT VFR BLD CALC: 33.3 % (ref 35.5–45.6)
HGB BLD-MCNC: 10.7 GM/DL (ref 11.8–15.2)
HYALINE CASTS #/AREA URNS LPF: 1 /LPF
INR PPP: 0.97 (ref 0.87–1.13)
MUCOUS THREADS #/AREA URNS HPF: (no result) /HPF
PH UR STRIP: 6 [PH] (ref 5–7)
RBC #/AREA URNS HPF: 1 /HPF (ref 0–6)
UROBILINOGEN UR-MCNC: < 2 MG/DL (ref ?–2)
WBC #/AREA URNS HPF: 1 /HPF (ref 0–6)

## 2019-11-04 PROCEDURE — 0W3P8ZZ CONTROL BLEEDING IN GASTROINTESTINAL TRACT, VIA NATURAL OR ARTIFICIAL OPENING ENDOSCOPIC: ICD-10-PCS | Performed by: INTERNAL MEDICINE

## 2019-11-04 RX ADMIN — ONDANSETRON PRN MG: 2 INJECTION INTRAMUSCULAR; INTRAVENOUS at 22:17

## 2019-11-04 RX ADMIN — MORPHINE SULFATE PRN MG: 2 INJECTION, SOLUTION INTRAMUSCULAR; INTRAVENOUS at 22:17

## 2019-11-04 RX ADMIN — Medication SCH ML: at 22:18

## 2019-11-04 RX ADMIN — POTASSIUM CHLORIDE SCH MLS/HR: 10 INJECTION, SOLUTION INTRAVENOUS at 07:35

## 2019-11-04 RX ADMIN — ONDANSETRON PRN MG: 2 INJECTION INTRAMUSCULAR; INTRAVENOUS at 15:39

## 2019-11-04 RX ADMIN — MORPHINE SULFATE PRN MG: 2 INJECTION, SOLUTION INTRAMUSCULAR; INTRAVENOUS at 18:11

## 2019-11-04 RX ADMIN — Medication SCH ML: at 12:43

## 2019-11-04 RX ADMIN — ACETAMINOPHEN PRN MG: 325 TABLET ORAL at 12:42

## 2019-11-04 RX ADMIN — ONDANSETRON PRN MG: 2 INJECTION INTRAMUSCULAR; INTRAVENOUS at 06:30

## 2019-11-04 NOTE — ANESTHESIA CONSULTATION
Anesthesia Consult and Med Hx


Date of service: 11/04/19





- Airway


ROM Head & Neck: Adequate


Mental/Hyoid Distance: Adequate


Mallampati Class: Class II


Intubation Access Assessment: Probably Good





- Pre-Operative Health Status


ASA Pre-Surgery Classification: ASA3, Emergency


Proposed Anesthetic Plan: MAC





- Pulmonary


Hx Smoking: No


Hx Asthma: Yes


COPD: No


Hx Pneumonia: No (Lung mass?)





- Cardiovascular System


Hx Hypertension: Yes (nonischemic cardiomyopathy, EF 15-20%)


Hx Coronary Artery Disease: No


Hx Cardia Arrhythmia: Yes (paroxysmal AFIB)


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


Hx Valvular Heart Disease: Yes (mild to mod MR)





- Central Nervous System


Hx Seizures: Yes (last months ago per patient, not on meds)


CVA: No


Hx Psychiatric Problems: No





- Gastrointestinal


Hx Ulcer: Yes (GI bleed)


Hx Gastroesophageal Reflux Disease: Yes (nausea, vomiting)





- Endocrine


Hx Renal Disease: No


Hx End Stage Renal Disease: No


Hx Insulin Dependent Diabetes: No


Hx Thyroid Disease: Yes (thyroid mass on CT)





- Other Systems


Hx Cancer: No

## 2019-11-04 NOTE — HISTORY AND PHYSICAL REPORT
History of Present Illness


Date of examination: 11/04/19


Date of admission: 


11/04/19 06:12





Chief complaint: 





Coffee-ground emesis and bloody stool


History of present illness: 





Patient is a 59-year-old male with known history of hypertension congestive 

heart failure cardiac arrhythmia presenting to the emergency room today 

complaining of having coffee-ground emesis and also bloody stool for the past 2 

days.


Patient indicates that he was vomiting blood draw 3 days ago and later became 

coffee-ground later today he has also been having some bloody stool.  He has had

associated abdominal pain.  He denies any fever no chills no chest pain or 

shortness of breath.  He however indicates that he has been having a migraine 

headaches lately but cannot recall the names of the medication he uses for his 

migraine.  He denies any use of NSAIDs.





Upon arriving in the emergency room, his work-up in the emergency room reveals a

Hemoccult positive stool, hemoglobin was stable at 11 and his EKG reveals atrial

fibrillation.





Past History


Past Medical History: atrial fib, heart failure, hypertension


Past Surgical History: Other (Knee surgery, wrist surgery, jaw surgery, history 

of pacemaker placement.)


Social history: no significant social history





Medications and Allergies


                                    Allergies











Allergy/AdvReac Type Severity Reaction Status Date / Time


 


No Known Allergies Allergy   Verified 03/27/17 02:41











                                Home Medications











 Medication  Instructions  Recorded  Confirmed  Last Taken  Type


 


Gabapentin 600 mg PO QHS 12/22/18 11/04/19 12/22/18 History


 


Metoprolol Xl [Metoprolol 100 mg PO BID 12/22/18 11/04/19 12/21/18 History





SUCCINATE ER TAB]     


 


NIFEdipine [Nifedipine ER] 60 mg PO DAILY 12/22/18 11/04/19 12/21/18 History


 


traZODone [Desyrel] 50 mg PO QHS 12/22/18 11/04/19 12/21/18 History


 


Amiodarone HCl [Amiodarone 100  mg PO DAILY 11/04/19 11/04/19 Unknown 

History





TAB]     


 


Apixaban [Eliquis] 5 mg PO DAILY 11/04/19 11/04/19 Unknown History


 


Aspirin [Adult Aspirin] 81 mg PO DAILY 11/04/19 11/04/19 Unknown History


 


Calcium Carb/Vit D3/Minerals 1 each PO DAILY 11/04/19 11/04/19 Unknown History





[Caltrate Plus]     


 


Cyanocobalamin [Vitamin B-12] 1,000 mcg PO DAILY 11/04/19 11/04/19 Unknown 

History


 


Isosorb Dinit/Hydralazine [Bidil 20 mg PO BID 11/04/19 11/04/19 Unknown History





20/37.5MG]     


 


Lisinopril [Zestril TAB] 10 mg PO QDAY 11/04/19 11/04/19 Unknown History


 


Prochlorperazine [Compazine] 10 mg PO Q6HR 11/04/19 11/04/19 Unknown History


 


Thiamine [Vitamin B-1] 100 mg PO QDAY 11/04/19 11/04/19 Unknown History











Active Meds: 


Active Medications





Acetaminophen (Tylenol)  650 mg PO Q4H PRN


   PRN Reason: Pain MILD(1-3)/Fever >100.5/HA


Pantoprazole Sodium 80 mg/ (Sodium Chloride)  100 mls @ 10 mls/hr IV AS DIRECT 

JORDANA


   Last Admin: 11/04/19 02:58 Dose:  8 mg/hr, 10 mls/hr


   Documented by: 


Sodium Chloride (Nacl 0.9% 1000 Ml)  1,000 mls @ 75 mls/hr IV ONCE ONE


   Stop: 11/04/19 17:19


   Last Admin: 11/04/19 04:03 Dose:  75 mls/hr


   Documented by: 


Ondansetron HCl (Zofran)  4 mg IV Q8H PRN


   PRN Reason: Nausea And Vomiting


Sodium Chloride (Sodium Chloride Flush Syringe 10 Ml)  10 ml IV BID JORDANA


Sodium Chloride (Sodium Chloride Flush Syringe 10 Ml)  10 ml IV PRN PRN


   PRN Reason: LINE FLUSH











Review of Systems


Cardiovascular: palpitations


Gastrointestinal: abdominal pain, nausea, vomiting, coffee ground emesis, melena


Rectal: bleeding





Exam





- Constitutional


Vitals: 


                                        











Temp Pulse Resp BP Pulse Ox


 


 98.6 F   94 H  11 L  118/82   94 


 


 11/03/19 23:53  11/04/19 05:00  11/04/19 05:00  11/04/19 05:00  11/04/19 05:00














- EENT


Eyes: Present: PERRL, EOM intact


ENT: hearing intact, clear oral mucosa, dentition normal





- Neck


Neck: Present: supple, normal ROM





- Respiratory


Respiratory effort: normal


Respiratory: bilateral: CTA





- Cardiovascular


Rhythm: irregularly irregular


Heart Sounds: Present: S1 & S2





- Extremities


Extremities: no ischemia, No edema


Peripheral Pulses: within normal limits





- Abdominal


General gastrointestinal: Present: soft, tender (Tenderness in the epigastric 

region.), non-distended





- Integumentary


Integumentary: Present: clear, warm, dry





- Musculoskeletal


Musculoskeletal: strength equal bilaterally





- Psychiatric


Psychiatric: appropriate mood/affect, intact judgment & insight





- Neurologic


Neurologic: CNII-XII intact, moves all extremities





Results





- Labs


CBC & Chem 7: 


                                 11/03/19 21:12





                                 11/03/19 21:12


Labs: 


                              Abnormal lab results











  11/03/19 11/03/19 Range/Units





  21:12 21:12 


 


Hgb  11.5 L   (11.8-15.2)  gm/dl


 


Hct  34.1 L   (35.5-45.6)  %


 


RDW  16.6 H   (13.2-15.2)  %


 


Plt Count  103 L   (140-440)  K/mm3


 


Mono % (Auto)  10.5 H   (0.0-7.3)  %


 


Lymph #  0.8 L   (1.2-5.4)  K/mm3


 


Seg Neutrophils %  75.1 H   (40.0-70.0)  %


 


Sodium   132 L  (137-145)  mmol/L


 


Potassium   3.1 L  (3.6-5.0)  mmol/L


 


Chloride   73.3 L  ()  mmol/L


 


BUN   33 H  (9-20)  mg/dL


 


AST   167 H  (5-40)  units/L


 


ALT   95 H  (7-56)  units/L














Assessment and Plan





- Patient Problems


(1) Upper GI bleed


Current Visit: Yes   Status: Acute   


Plan to address problem: 


Patient admitted onto the medical floor.  Has been made n.p.o.  He has been 

started on Protonix drip.


Place a consult to gastroenterology for evaluation of further recommendation.


We will also monitor hemoglobin and hematocrit.


Patient placed on gentle IV hydration in view of his history of CHF.








(2) Atrial fibrillation with rapid ventricular response


Current Visit: Yes   Status: Acute   


Plan to address problem: 


We will monitor patient on telemetry.  Who placed on IV Cardizem if needed.

## 2019-11-04 NOTE — GASTROENTEROLOGY CONSULTATION
History of Present Illness





- Reason for Consult


Consult date: 11/04/19


Hematemesis


Requesting physician: MEET SINGH





- History of Present Illness








Patient is a 59-year-old male with PMH CHF, cardiac arrhythmia, not on 

anticoagulation presenting to the emergency room today complaining of having 

coffee-ground emesis with retching and abd pain


Abd pain is sharp, moderate to severe, lower abd, constant, worse with pressure 

and better with nothing, associated with hematemesis and retching and coffee 

ground emesis, duration 1 week, getting worse


He denies any use of NSAIDs.





Found to be hemocccult positive and in afib





home meds updated and reconciled and reviewed 





Past History


Past Medical History: atrial fib, heart failure, hypertension


Past Surgical History: Other (Knee surgery, wrist surgery, jaw surgery, history 

of pacemaker placement.)


Social history: no significant social history





Medications and Allergies


                                    Allergies











Allergy/AdvReac Type Severity Reaction Status Date / Time


 


No Known Allergies Allergy   Verified 03/27/17 02:41











                                Home Medications











 Medication  Instructions  Recorded  Confirmed  Last Taken  Type


 


Gabapentin 600 mg PO QHS 12/22/18 11/04/19 12/22/18 History


 


Metoprolol Xl [Metoprolol 100 mg PO BID 12/22/18 11/04/19 12/21/18 History





SUCCINATE ER TAB]     


 


NIFEdipine [Nifedipine ER] 60 mg PO DAILY 12/22/18 11/04/19 12/21/18 History


 


traZODone [Desyrel] 50 mg PO QHS 12/22/18 11/04/19 12/21/18 History


 


Amiodarone HCl [Amiodarone 100  mg PO DAILY 11/04/19 11/04/19 Unknown 

History





TAB]     


 


Apixaban [Eliquis] 5 mg PO DAILY 11/04/19 11/04/19 Unknown History


 


Aspirin [Adult Aspirin] 81 mg PO DAILY 11/04/19 11/04/19 Unknown History


 


Calcium Carb/Vit D3/Minerals 1 each PO DAILY 11/04/19 11/04/19 Unknown History





[Caltrate Plus]     


 


Cyanocobalamin [Vitamin B-12] 1,000 mcg PO DAILY 11/04/19 11/04/19 Unknown 

History


 


Isosorb Dinit/Hydralazine [Bidil 20 mg PO BID 11/04/19 11/04/19 Unknown History





20/37.5MG]     


 


Lisinopril [Zestril TAB] 10 mg PO QDAY 11/04/19 11/04/19 Unknown History


 


Prochlorperazine [Compazine] 10 mg PO Q6HR 11/04/19 11/04/19 Unknown History


 


Thiamine [Vitamin B-1] 100 mg PO QDAY 11/04/19 11/04/19 Unknown History











Active Meds: 


Active Medications





Acetaminophen (Tylenol)  650 mg PO Q4H PRN


   PRN Reason: Pain MILD(1-3)/Fever >100.5/HA


Pantoprazole Sodium 80 mg/ (Sodium Chloride)  100 mls @ 10 mls/hr IV AS DIRECT 

JORDANA


   Last Admin: 11/04/19 02:58 Dose:  8 mg/hr, 10 mls/hr


   Documented by: 


Sodium Chloride (Nacl 0.9% 1000 Ml)  1,000 mls @ 75 mls/hr IV ONCE ONE


   Stop: 11/04/19 17:19


   Last Admin: 11/04/19 04:03 Dose:  75 mls/hr


   Documented by: 


Potassium Chloride (Kcl 10meq/100ml)  10 meq in 100 mls @ 100 mls/hr IV Q1H JORDANA


   Stop: 11/04/19 08:59


   Last Admin: 11/04/19 07:35 Dose:  100 mls/hr


   Documented by: 


Sodium Chloride (Nacl 0.9% 1000 Ml)  1,000 mls @ 50 mls/hr IV AS DIRECT JORDANA


Ondansetron HCl (Zofran)  4 mg IV Q8H PRN


   PRN Reason: Nausea And Vomiting


   Last Admin: 11/04/19 06:30 Dose:  4 mg


   Documented by: 


Sodium Chloride (Sodium Chloride Flush Syringe 10 Ml)  10 ml IV BID JORDANA


Sodium Chloride (Sodium Chloride Flush Syringe 10 Ml)  10 ml IV PRN PRN


   PRN Reason: LINE FLUSH











Review of Systems





- Review of Systems


Constitutional: fatigue (10 systems reviewed and negative except as above in the

HPI)





Exam





- Constitutional


Vital Signs: 


                                        











Temp Pulse Resp BP Pulse Ox


 


 98.6 F   89   20   143/93   92 


 


 11/03/19 23:53  11/04/19 07:30  11/04/19 07:35  11/04/19 07:30  11/04/19 07:30











General appearance: no acute distress





- EENT


Eyes: EOM intact


ENT: hearing intact





- Neck


Neck: supple





- Respiratory


Respiratory effort: normal





- Cardiovascular


Rhythm: irregularly irregular





- Gastrointestinal


General gastrointestinal: Present: soft, tender





- Integumentary


Integumentary: Present: warm, dry





- Neurologic


Neurological: alert and oriented x3





- Psychiatric


Psychiatric: appropriate mood/affect





- Labs


CBC & Chem 7: 


                                 11/03/19 21:12





                                 11/03/19 21:12


Lab Results: 


                         Laboratory Results - last 24 hr











  11/03/19 11/03/19 11/03/19





  00:00 01:31 EST 21:12


 


WBC    5.5


 


RBC    3.88


 


Hgb    11.5 L


 


Hct    34.1 L


 


MCV    88


 


MCH    30


 


MCHC    34


 


RDW    16.6 H


 


Plt Count    103 L


 


Lymph % (Auto)    14.0


 


Mono % (Auto)    10.5 H


 


Eos % (Auto)    0.0


 


Baso % (Auto)    0.4


 


Lymph #    0.8 L


 


Mono #    0.6


 


Eos #    0.0


 


Baso #    0.0


 


Seg Neutrophils %    75.1 H


 


Seg Neutrophils #    4.2


 


PT   


 


INR   


 


APTT   


 


Sodium   


 


Potassium   


 


Chloride   


 


Carbon Dioxide   


 


Anion Gap   


 


BUN   


 


Creatinine   


 


Estimated GFR   


 


BUN/Creatinine Ratio   


 


Glucose   


 


Calcium   


 


Total Bilirubin   


 


AST   


 


ALT   


 


Alkaline Phosphatase   


 


Total Protein   


 


Albumin   


 


Albumin/Globulin Ratio   


 


Lipase   


 


Urine Color   Yellow 


 


Urine Turbidity   Clear 


 


Urine pH   6.0 


 


Ur Specific Gravity   1.023 


 


Urine Protein   100 mg/dl 


 


Urine Glucose (UA)   Neg 


 


Urine Ketones   80 


 


Urine Blood   Mod 


 


Urine Nitrite   Neg 


 


Urine Bilirubin   Neg 


 


Urine Urobilinogen   < 2.0 


 


Ur Leukocyte Esterase   Neg 


 


Urine WBC (Auto)   1.0 


 


Urine RBC (Auto)   1.0 


 


U Epithel Cells (Auto)   < 1.0 


 


Hyaline Casts   1 


 


Urine Mucus   Few 


 


Blood Type  O POSITIVE  


 


Antibody Screen  Negative  














  11/03/19 11/04/19





  21:12 00:00


 


WBC  


 


RBC  


 


Hgb  


 


Hct  


 


MCV  


 


MCH  


 


MCHC  


 


RDW  


 


Plt Count  


 


Lymph % (Auto)  


 


Mono % (Auto)  


 


Eos % (Auto)  


 


Baso % (Auto)  


 


Lymph #  


 


Mono #  


 


Eos #  


 


Baso #  


 


Seg Neutrophils %  


 


Seg Neutrophils #  


 


PT   12.8


 


INR   0.97


 


APTT   27.0


 


Sodium  132 L 


 


Potassium  3.1 L 


 


Chloride  73.3 L 


 


Carbon Dioxide  23 


 


Anion Gap  39 


 


BUN  33 H 


 


Creatinine  1.5 


 


Estimated GFR  58 


 


BUN/Creatinine Ratio  22 


 


Glucose  97 


 


Calcium  9.0 


 


Total Bilirubin  0.90 


 


AST  167 H 


 


ALT  95 H 


 


Alkaline Phosphatase  81 


 


Total Protein  7.7 


 


Albumin  4.9 


 


Albumin/Globulin Ratio  1.8 


 


Lipase  25 


 


Urine Color  


 


Urine Turbidity  


 


Urine pH  


 


Ur Specific Gravity  


 


Urine Protein  


 


Urine Glucose (UA)  


 


Urine Ketones  


 


Urine Blood  


 


Urine Nitrite  


 


Urine Bilirubin  


 


Urine Urobilinogen  


 


Ur Leukocyte Esterase  


 


Urine WBC (Auto)  


 


Urine RBC (Auto)  


 


U Epithel Cells (Auto)  


 


Hyaline Casts  


 


Urine Mucus  


 


Blood Type  


 


Antibody Screen  














Assessment and Plan





Ddx includes PUD, Are Faith, AVM, severe hemorrhagic gastritis, etc.


Plan for EGD, in meantime continue PPI and NPO status.








- Patient Problems


(1) Abdominal pain


Current Visit: Yes   Status: Acute   





(2) Upper GI bleed


Current Visit: Yes   Status: Acute

## 2019-11-04 NOTE — OPERATIVE REPORT
Operative Report


Operative Report: 





DOS: 11/4/19


 


SURGEON: Milton Campo MD


 


EGD with hemostasis REPORT


 


PREOPERATIVE DIAGNOSIS and POSTOPERATIVE DIAGNOSIS: Upper GI bleed


 


ESTIMATED BLOOD LOSS: 10cc


 


DESCRIPTION OF PROCEDURE: A high-resolution EGD scope was passed through the 

oropharynx, esophagus, stomach, and second portion of duodenum. The scope was 

carefully withdrawn. Retroflexion was performed in the stomach. At the end of 

the procedure, the scope was cleaned using normal technique. Vital signs 

monitored continuously throughout.


 


SEDATION: Provided by [facility].


 


COMPLICATIONS: None.


 


FINDINGS: 


* Normal D2 of the duodenum


* Mild duodenitis of the duodenal bulb


* Moderate gastritis with erythema of the gastric antrum and body; given 

  bleeding biopsies not obtained


* GE junction about 40cm from the incisors


* 2cm Hiatal hernia


* Likely long segment Olivarez's esophagus, C9M10, with some localized areas of 

  nodularity.  Biopsies not obtained due to bleeding


* Visible Suture just proximal to the GE junction


* Blood clot with active oozing of blood just proximal to the GE junction; area 

  was washed and suctioned and just appeared to be esophagitis.  Clip deployed 

  with significant decrease in bleeding but still with small amount of oozing; 

  therefore dilute epinephrine was used to inject around the site of bleeding, 

  1cc lateral to the clip in 2 spots; with the second injection the bleeding 

  from the original site stopped, but the injection site began to bleed and so a

  second clip was deployed and complete hemostasis was attained


 


RECOMMENDATIONS: 


* Complete 48 hours PPI drip then BID PPI


* Avoid solid food for 7 days


* Patient will REQUIRE repeat EGD in the near future for Olivarez's biopsies


* Avoid NSAIDs and blood thinners

## 2019-11-05 LAB
BASOPHILS # (AUTO): (no result) K/MM3 (ref 0–0.1)
BASOPHILS # (AUTO): 0 K/MM3 (ref 0–0.1)
BASOPHILS NFR BLD AUTO: (no result) % (ref 0–1.8)
BASOPHILS NFR BLD AUTO: 0.7 % (ref 0–1.8)
BUN SERPL-MCNC: 19 MG/DL (ref 9–20)
BUN/CREAT SERPL: 19 %
CALCIUM SERPL-MCNC: 8.8 MG/DL (ref 8.4–10.2)
EOSINOPHIL # BLD AUTO: (no result) K/MM3 (ref 0–0.4)
EOSINOPHIL # BLD AUTO: 0 K/MM3 (ref 0–0.4)
EOSINOPHIL NFR BLD AUTO: (no result) % (ref 0–4.3)
EOSINOPHIL NFR BLD AUTO: 0.5 % (ref 0–4.3)
HCT VFR BLD CALC: (no result) % (ref 35.5–45.6)
HCT VFR BLD CALC: 30.2 % (ref 35.5–45.6)
HCT VFR BLD CALC: 32.7 % (ref 35.5–45.6)
HEMOLYSIS INDEX: 24
HGB BLD-MCNC: (no result) GM/DL (ref 11.8–15.2)
HGB BLD-MCNC: 10 GM/DL (ref 11.8–15.2)
HGB BLD-MCNC: 11 GM/DL (ref 11.8–15.2)
INR PPP: 1.01 (ref 0.87–1.13)
LYMPHOCYTES # BLD AUTO: (no result) K/MM3 (ref 1.2–5.4)
LYMPHOCYTES # BLD AUTO: 0.7 K/MM3 (ref 1.2–5.4)
LYMPHOCYTES NFR BLD AUTO: (no result) % (ref 13.4–35)
LYMPHOCYTES NFR BLD AUTO: 21.8 % (ref 13.4–35)
MCHC RBC AUTO-ENTMCNC: (no result) % (ref 32–34)
MCHC RBC AUTO-ENTMCNC: 33 % (ref 32–34)
MCV RBC AUTO: (no result) FL (ref 84–94)
MCV RBC AUTO: 89 FL (ref 84–94)
MONOCYTES # (AUTO): (no result) K/MM3 (ref 0–0.8)
MONOCYTES # (AUTO): 0.4 K/MM3 (ref 0–0.8)
MONOCYTES % (AUTO): (no result) % (ref 0–7.3)
MONOCYTES % (AUTO): 11.1 % (ref 0–7.3)
PLATELET # BLD: (no result) K/MM3 (ref 140–440)
PLATELET # BLD: 75 K/MM3 (ref 140–440)
RBC # BLD AUTO: (no result) M/MM3 (ref 3.65–5.03)
RBC # BLD AUTO: 3.4 M/MM3 (ref 3.65–5.03)

## 2019-11-05 RX ADMIN — MORPHINE SULFATE PRN MG: 2 INJECTION, SOLUTION INTRAMUSCULAR; INTRAVENOUS at 18:06

## 2019-11-05 RX ADMIN — MORPHINE SULFATE PRN MG: 2 INJECTION, SOLUTION INTRAMUSCULAR; INTRAVENOUS at 11:20

## 2019-11-05 RX ADMIN — Medication SCH ML: at 21:39

## 2019-11-05 RX ADMIN — MORPHINE SULFATE PRN MG: 2 INJECTION, SOLUTION INTRAMUSCULAR; INTRAVENOUS at 02:35

## 2019-11-05 RX ADMIN — Medication SCH ML: at 11:23

## 2019-11-05 RX ADMIN — AMIODARONE HYDROCHLORIDE SCH MG: 200 TABLET ORAL at 17:41

## 2019-11-05 RX ADMIN — METOPROLOL SUCCINATE SCH MG: 100 TABLET, EXTENDED RELEASE ORAL at 21:39

## 2019-11-05 RX ADMIN — MORPHINE SULFATE PRN MG: 2 INJECTION, SOLUTION INTRAMUSCULAR; INTRAVENOUS at 23:18

## 2019-11-05 NOTE — GASTROENTEROLOGY PROGRESS NOTE
<ZBIGNIEW HURLEY - Last Filed: 11/05/19 10:15>





Assessment and Plan


1.UGIB





-H/H 10.0/30.2-slight drop


-continue to monitor H/H and transfuse as needed


-s/p EGD yesterday that showed: 


* Normal D2 of the duodenum


* Mild duodenitis of the duodenal bulb


* Moderate gastritis with erythema of the gastric antrum and body; given 

  bleeding biopsies not obtained


* GE junction about 40cm from the incisors


* 2cm Hiatal hernia


* Likely long segment Olivarez's esophagus, C9M10, with some localized areas of 

  nodularity.  Biopsies not obtained due to bleeding


* Visible Suture just proximal to the GE junction


* Blood clot with active oozing of blood just proximal to the GE junction; area 

  was washed and suctioned and just appeared to be esophagitis.  Clip deployed 

  with significant decrease in bleeding but still with small amount of oozing; 

  therefore dilute epinephrine was used to inject around the site of bleeding, 

  1cc lateral to the clip in 2 spots; with the second injection the bleeding 

  from the original site stopped, but the injection site began to bleed and so a

  second clip was deployed and complete hemostasis was attained





-clinically, patient is stable with no active signs of bleeding overnigh or this

am. Denies abd pain or N/V.


-okay to start on full liquid diet


-will repeat H/H this afternoon, if stable okay to advance to soft diet (would 

not advance further x 7 days)


-continue protonix drip today then transition to BID tomorrow if no further 

bleeding


-avoid NSAIDs or blood thinning medications


-continue supportive care


-patient will need repeat EGD in the near future as outpatient for Olivarez's 

biopsies


-will follow


 








Subjective


Date of service: 11/05/19


Principal diagnosis: UGIB


Interval history: 


No acute distress or active signs of bleeding overnight or this am per pt.








Objective





- Constitutional


Vitals: 


                                        











Temp Pulse Resp BP Pulse Ox


 


 98.8 F   100 H  18   146/92   96 


 


 11/05/19 07:14  11/05/19 07:14  11/05/19 07:14  11/05/19 07:14  11/05/19 07:14











General appearance: no acute distress





- EENT


Eyes: PERRL, EOM intact


ENT: hearing intact





- Respiratory


Respiratory effort: normal





- Cardiovascular


Rhythm: other (tachycardia)





- Gastrointestinal


General gastrointestinal: Present: soft, non-tender, non-distended, normal bowel

sounds





- Neurologic


Neurological: alert and oriented x3





- Labs


CBC & Chem 7: 


                                 11/05/19 07:55





                                 11/05/19 04:18


Labs: 


                         Laboratory Results - last 24 hr











  11/04/19 11/05/19 11/05/19





  12:05 04:18 04:18


 


WBC   TNR 


 


RBC   TNR 


 


Hgb  10.7 L  TNR 


 


Hct  33.3 L  TNR 


 


MCV   TNR 


 


MCH   TNR 


 


MCHC   TNR 


 


RDW   TNR 


 


Plt Count   TNR 


 


Lymph % (Auto)   TNR 


 


Mono % (Auto)   TNR 


 


Eos % (Auto)   TNR 


 


Baso % (Auto)   TNR 


 


Lymph #   TNR 


 


Mono #   TNR 


 


Eos #   TNR 


 


Baso #   TNR 


 


Add Manual Diff   TNR 


 


Seg Neutrophils %   TNR 


 


Seg Neutrophils #   TNR 


 


PT    13.2


 


INR    1.01


 


Sodium   


 


Potassium   


 


Chloride   


 


Carbon Dioxide   


 


Anion Gap   


 


BUN   


 


Creatinine   


 


Estimated GFR   


 


BUN/Creatinine Ratio   


 


Glucose   


 


Calcium   














  11/05/19 11/05/19





  04:18 07:55


 


WBC   3.4 L


 


RBC   3.40 L


 


Hgb   10.0 L


 


Hct   30.2 L


 


MCV   89


 


MCH   30


 


MCHC   33


 


RDW   16.5 H


 


Plt Count   75 L


 


Lymph % (Auto)   21.8


 


Mono % (Auto)   11.1 H


 


Eos % (Auto)   0.5


 


Baso % (Auto)   0.7


 


Lymph #   0.7 L


 


Mono #   0.4


 


Eos #   0.0


 


Baso #   0.0


 


Add Manual Diff  


 


Seg Neutrophils %   65.9


 


Seg Neutrophils #   2.2


 


PT  


 


INR  


 


Sodium  134 L 


 


Potassium  3.0 L 


 


Chloride  83.1 L 


 


Carbon Dioxide  32 H D 


 


Anion Gap  22 


 


BUN  19 


 


Creatinine  1.0 


 


Estimated GFR  > 60 


 


BUN/Creatinine Ratio  19 


 


Glucose  89 


 


Calcium  8.8 














<MIKA LEON - Last Filed: 11/05/19 21:47>





Assessment and Plan





Patient seen and examined.  I have reviewed the advanced practitioner's 

evaluation, assessment, and plan, and agree with them.  I note the following 

additions:


No overt bleeding, monitor H/H to ensure no declines, and advance diet to soft 

mechanical (do not advance beyond that for another 6 days)


Complete another 24 hours PPI drip then can DC home on BID drip and OPD f/u with

me in 2 weeks





- Patient Problems


(1) Abdominal pain


Current Visit: Yes   Status: Acute   





(2) Upper GI bleed


Current Visit: Yes   Status: Acute   





Objective





- Constitutional


Vitals: 


                                        











Temp Pulse Resp BP Pulse Ox


 


 99.2 F   96 H  18   150/103   90 


 


 11/05/19 19:49  11/05/19 21:39  11/05/19 19:49  11/05/19 21:39  11/05/19 19:49














- Labs


CBC & Chem 7: 


                                 11/05/19 12:49





                                 11/05/19 04:18


Labs: 


                         Laboratory Results - last 24 hr











  11/05/19 11/05/19 11/05/19





  04:18 04:18 04:18


 


WBC  TNR  


 


RBC  TNR  


 


Hgb  TNR  


 


Hct  TNR  


 


MCV  TNR  


 


MCH  TNR  


 


MCHC  TNR  


 


RDW  TNR  


 


Plt Count  TNR  


 


Lymph % (Auto)  TNR  


 


Mono % (Auto)  TNR  


 


Eos % (Auto)  TNR  


 


Baso % (Auto)  TNR  


 


Lymph #  TNR  


 


Mono #  TNR  


 


Eos #  TNR  


 


Baso #  TNR  


 


Add Manual Diff  TNR  


 


Seg Neutrophils %  TNR  


 


Seg Neutrophils #  TNR  


 


PT   13.2 


 


INR   1.01 


 


Sodium    134 L


 


Potassium    3.0 L


 


Chloride    83.1 L


 


Carbon Dioxide    32 H D


 


Anion Gap    22


 


BUN    19


 


Creatinine    1.0


 


Estimated GFR    > 60


 


BUN/Creatinine Ratio    19


 


Glucose    89


 


Calcium    8.8














  11/05/19 11/05/19





  07:55 12:49


 


WBC  3.4 L 


 


RBC  3.40 L 


 


Hgb  10.0 L  11.0 L


 


Hct  30.2 L  32.7 L


 


MCV  89 


 


MCH  30 


 


MCHC  33 


 


RDW  16.5 H 


 


Plt Count  75 L 


 


Lymph % (Auto)  21.8 


 


Mono % (Auto)  11.1 H 


 


Eos % (Auto)  0.5 


 


Baso % (Auto)  0.7 


 


Lymph #  0.7 L 


 


Mono #  0.4 


 


Eos #  0.0 


 


Baso #  0.0 


 


Add Manual Diff  


 


Seg Neutrophils %  65.9 


 


Seg Neutrophils #  2.2 


 


PT  


 


INR  


 


Sodium  


 


Potassium  


 


Chloride  


 


Carbon Dioxide  


 


Anion Gap  


 


BUN  


 


Creatinine  


 


Estimated GFR  


 


BUN/Creatinine Ratio  


 


Glucose  


 


Calcium

## 2019-11-05 NOTE — PROGRESS NOTE
Assessment and Plan


Assessment and plan: 


Patient is a 58 yo man with a history of hypertension, CHF, CMP with ICD and 

Afib who presented to [facility] ED with coffee ground emesis and dark tarry stools. 





EGD Operative Report: 


DOS: 11/4/19


SURGEON: Milton Campo MD


EGD with hemostasis REPORT


PREOPERATIVE DIAGNOSIS and POSTOPERATIVE DIAGNOSIS: Upper GI bleed


ESTIMATED BLOOD LOSS: 10cc


DESCRIPTION OF PROCEDURE: A high-resolution EGD scope was passed through the 

oropharynx, esophagus, stomach, and second portion of duodenum. The scope was 

carefully withdrawn. Retroflexion was performed in the stomach. At the end of 

the procedure, the scope was cleaned using normal technique. Vital signs 

monitored continuously throughout.


SEDATION: Provided by [facility].


COMPLICATIONS: None.


FINDINGS: 


   Normal D2 of the duodenum


   Mild duodenitis of the duodenal bulb


   Moderate gastritis with erythema of the gastric antrum and body; given 

bleeding biopsies not obtained


   GE junction about 40cm from the incisors


   2cm Hiatal hernia


   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 

nodularity.  Biopsies not obtained due to bleeding


   Visible Suture just proximal to the GE junction


   Blood clot with active oozing of blood just proximal to the GE junction; area

was washed and suctioned and just appeared to be esophagitis.  Clip deployed 

with significant decrease in bleeding but still with small amount of oozing; 

therefore dilute epinephrine was used to inject around the site of bleeding, 1cc

lateral to the clip in 2 spots; with the second injection the bleeding from the 

original site stopped, but the injection site began to bleed and so a second 

clip was deployed and complete hemostasis was attained


 


RECOMMENDATIONS: 


   Complete 48 hours PPI drip then BID PPI


   Avoid solid food for 7 days


   Patient will REQUIRE repeat EGD in the near future for Olivarez's biopsies


   Avoid NSAIDs and blood thinners





Upper GIB due to PUD/GE junction: GI following, advance diet very slowly


Acute blood loss anemia due to GI hemorrhage: treat with PPI


NSVT: consult Cardiology, monitor Electrolyte closely


Atrial fibrillation with rapid ventricular response:  restart home Amiodarone 

and BBlocker, 








History


Interval history: 


Patient was seen and examined. Follow-up on current diagnosis of GIB. No 

overnight events reported to me. Patient denies any chest pain, shortness 

breath, nausea/vomiting or severe headaches. Imaging, nursing note, chart, labs 

and old chart reviewed. Discussed with patient. I sat down and explained to 

patient because He says no one is telling him anything but he can regurgitate 

exactly what is going on.





Hospitalist Physical





- Physical exam


Narrative exam: 


Gen: WDWN, NAD, Awake, Alert, Orientated 


HEENT: NCAT, EOMI, PERRL, OP Clear 


Neck: supple, no adenopathy, no thyromegaly, no JVD 


CVS/Heart: irregular normal S1S2, pulses present bilaterally 


Chest/Lungs: CTA B, Symmetrical chest expansion, good air entry bilaterally 


GI/Abdomen: soft, epigastric tenderness, good bowel sounds, no guarding or 

rebound 


/Bladder: no suprapubic tenderness, no CVA or paraspinal tenderness 


Extermity/Skin: no c/c/e, no obvious rash 


MSK: FROM x 4 


Neuro: CN 2-12 grossly intact, no new focal deficits 


Psych: calm 





- Constitutional


Vitals: 


                                        











Temp Pulse Resp BP Pulse Ox


 


 98.8 F   93 H  18   127/81   93 


 


 11/05/19 07:14  11/05/19 13:47  11/05/19 12:59  11/05/19 13:47  11/05/19 13:47














Results





- Labs


CBC & Chem 7: 


                                 11/05/19 12:49





                                 11/05/19 04:18


Labs: 


                             Laboratory Last Values











WBC  3.4 K/mm3 (4.5-11.0)  L  11/05/19  07:55    


 


RBC  3.40 M/mm3 (3.65-5.03)  L  11/05/19  07:55    


 


Hgb  11.0 gm/dl (11.8-15.2)  L  11/05/19  12:49    


 


Hct  32.7 % (35.5-45.6)  L  11/05/19  12:49    


 


MCV  89 fl (84-94)   11/05/19  07:55    


 


MCH  30 pg (28-32)   11/05/19  07:55    


 


MCHC  33 % (32-34)   11/05/19  07:55    


 


RDW  16.5 % (13.2-15.2)  H  11/05/19  07:55    


 


Plt Count  75 K/mm3 (140-440)  L  11/05/19  07:55    


 


Lymph % (Auto)  21.8 % (13.4-35.0)   11/05/19  07:55    


 


Mono % (Auto)  11.1 % (0.0-7.3)  H  11/05/19  07:55    


 


Eos % (Auto)  0.5 % (0.0-4.3)   11/05/19  07:55    


 


Baso % (Auto)  0.7 % (0.0-1.8)   11/05/19  07:55    


 


Lymph #  0.7 K/mm3 (1.2-5.4)  L  11/05/19  07:55    


 


Mono #  0.4 K/mm3 (0.0-0.8)   11/05/19  07:55    


 


Eos #  0.0 K/mm3 (0.0-0.4)   11/05/19  07:55    


 


Baso #  0.0 K/mm3 (0.0-0.1)   11/05/19  07:55    


 


Add Manual Diff  TNR   11/05/19  04:18    


 


Seg Neutrophils %  65.9 % (40.0-70.0)   11/05/19  07:55    


 


Seg Neutrophils #  2.2 K/mm3 (1.8-7.7)   11/05/19  07:55    


 


PT  13.2 Sec. (12.2-14.9)   11/05/19  04:18    


 


INR  1.01  (0.87-1.13)   11/05/19  04:18    


 


APTT  27.0 Sec. (24.2-36.6)   11/04/19  00:00    


 


Sodium  134 mmol/L (137-145)  L  11/05/19  04:18    


 


Potassium  3.0 mmol/L (3.6-5.0)  L  11/05/19  04:18    


 


Chloride  83.1 mmol/L ()  L  11/05/19  04:18    


 


Carbon Dioxide  32 mmol/L (22-30)  H D 11/05/19  04:18    


 


Anion Gap  22 mmol/L  11/05/19  04:18    


 


BUN  19 mg/dL (9-20)   11/05/19  04:18    


 


Creatinine  1.0 mg/dL (0.8-1.5)   11/05/19  04:18    


 


Estimated GFR  > 60 ml/min  11/05/19  04:18    


 


BUN/Creatinine Ratio  19 %  11/05/19  04:18    


 


Glucose  89 mg/dL ()   11/05/19  04:18    


 


Calcium  8.8 mg/dL (8.4-10.2)   11/05/19  04:18    


 


Total Bilirubin  0.90 mg/dL (0.1-1.2)   11/03/19  21:12    


 


AST  167 units/L (5-40)  H  11/03/19  21:12    


 


ALT  95 units/L (7-56)  H  11/03/19  21:12    


 


Alkaline Phosphatase  81 units/L ()   11/03/19  21:12    


 


Total Protein  7.7 g/dL (6.3-8.2)   11/03/19  21:12    


 


Albumin  4.9 g/dL (3.9-5)   11/03/19  21:12    


 


Albumin/Globulin Ratio  1.8 %  11/03/19  21:12    


 


Lipase  25 units/L (13-60)   11/03/19  21:12    


 


Urine Color  Yellow  (Yellow)   11/03/19  01:31 EST    


 


Urine Turbidity  Clear  (Clear)   11/03/19  01:31 EST    


 


Urine pH  6.0  (5.0-7.0)   11/03/19  01:31 EST    


 


Ur Specific Gravity  1.023  (1.003-1.030)   11/03/19  01:31 EST    


 


Urine Protein  100 mg/dl mg/dL (Negative)   11/03/19  01:31 EST    


 


Urine Glucose (UA)  Neg mg/dL (Negative)   11/03/19  01:31 EST    


 


Urine Ketones  80 mg/dL (Negative)   11/03/19  01:31 EST    


 


Urine Blood  Mod  (Negative)   11/03/19  01:31 EST    


 


Urine Nitrite  Neg  (Negative)   11/03/19  01:31 EST    


 


Urine Bilirubin  Neg  (Negative)   11/03/19  01:31 EST    


 


Urine Urobilinogen  < 2.0 mg/dL (<2.0)   11/03/19  01:31 EST    


 


Ur Leukocyte Esterase  Neg  (Negative)   11/03/19  01:31 EST    


 


Urine WBC (Auto)  1.0 /HPF (0.0-6.0)   11/03/19  01:31 EST    


 


Urine RBC (Auto)  1.0 /HPF (0.0-6.0)   11/03/19  01:31 EST    


 


U Epithel Cells (Auto)  < 1.0 /HPF (0-13.0)   11/03/19  01:31 EST    


 


Hyaline Casts  1 /LPF  11/03/19  01:31 EST    


 


Urine Mucus  Few /HPF  11/03/19  01:31 EST    


 


Blood Type  O POSITIVE   11/03/19  00:00    


 


Antibody Screen  Negative   11/03/19  00:00    














Active Medications





- Current Medications


Current Medications: 














Generic Name Dose Route Start Last Admin





  Trade Name Freq  PRN Reason Stop Dose Admin


 


Acetaminophen  650 mg  11/04/19 03:47  11/04/19 12:42





  Tylenol  PO   650 mg





  Q4H PRN   Administration





  Pain MILD(1-3)/Fever >100.5/HA  


 


Hydralazine HCl  10 mg  11/04/19 17:07 





  Apresoline  IV  





  Q4HR PRN  





  BP >160/100  


 


Pantoprazole Sodium 80 mg/  100 mls @ 10 mls/hr  11/04/19 03:00  11/04/19 02:58





  Sodium Chloride  IV  11/06/19 02:59  8 mg/hr





  AS DIRECT JORDANA   10 mls/hr





    Administration





  8 MG/HR  


 


Sodium Chloride  1,000 mls @ 50 mls/hr  11/04/19 08:00  11/04/19 18:12





  Nacl 0.9% 1000 Ml  IV   50 mls/hr





  AS DIRECT JORDANA   Administration


 


Metoprolol Succinate  100 mg  11/05/19 22:00 





  Metoprolol Xl  PO  





  BID [facility]  


 


Miscellaneous Medication  200 mg  11/05/19 17:00 





  Amiodarone Hcl [Amiodarone 100 Mg Tab]  PO  





  DAILY JORDANA  


 


Morphine Sulfate  2 mg  11/04/19 17:07  11/05/19 11:20





  Morphine  IV   2 mg





  Q4H PRN   Administration





  Pain, Moderate (4-6)  


 


Nifedipine  60 mg  11/06/19 10:00 





  Procardia Xl  PO  





  DAILY [facility]  


 


Ondansetron HCl  4 mg  11/04/19 03:47  11/04/19 22:17





  Zofran  IV   4 mg





  Q8H PRN   Administration





  Nausea And Vomiting  


 


Pantoprazole Sodium  40 mg  11/06/19 10:00 





  Protonix  IV  





  BID JORDANA  


 


Sodium Chloride  10 ml  11/04/19 10:00  11/05/19 11:23





  Sodium Chloride Flush Syringe 10 Ml  IV   10 ml





  BID JORDANA   Administration


 


Sodium Chloride  10 ml  11/04/19 03:47 





  Sodium Chloride Flush Syringe 10 Ml  IV  





  PRN PRN  





  LINE FLUSH  


 


Thiamine HCl  100 mg  11/06/19 10:00 





  Vitamin B-1  PO  





  QDAY JORDANA  














Nutrition/Malnutrition Assess





- Dietary Evaluation


Nutrition/Malnutrition Findings: 


                                        





Nutrition Notes                                            Start:  11/05/19 

10:09


Freq:                                                      Status: Active       




Protocol:                                                                       




 Document     11/05/19 10:10  BRADLY  (Rec: 11/05/19 10:13  BRADLY  [id number])


 Co-Sign      11/05/19 10:10  LP


 Nutrition Notes


     Need for Assessment generated from:         Low BMI


     Initial or Follow up                        Brief Note


     Height                                      6 ft 1 in


     Weight                                      73.4 kg


     Ideal Body Weight (kg)                      83.63


     BMI                                         21.3


     Subjective/Other Information                Low BMI screen


                                                 Upon arrival writer weighed pt


                                                 at 73.4 kg.


 Nutrition Intervention


     Revisit per MD consult or patient           Sign Off


      request:

## 2019-11-06 LAB
BUN SERPL-MCNC: 18 MG/DL (ref 9–20)
BUN/CREAT SERPL: 20 %
CALCIUM SERPL-MCNC: 9 MG/DL (ref 8.4–10.2)
HCT VFR BLD CALC: 27.5 % (ref 35.5–45.6)
HCT VFR BLD CALC: 30.5 % (ref 35.5–45.6)
HDLC SERPL-MCNC: 60 MG/DL (ref 40–59)
HEMOLYSIS INDEX: 0
HGB BLD-MCNC: 10.1 GM/DL (ref 11.8–15.2)
HGB BLD-MCNC: 9.3 GM/DL (ref 11.8–15.2)
MCHC RBC AUTO-ENTMCNC: 33 % (ref 32–34)
MCHC RBC AUTO-ENTMCNC: 34 % (ref 32–34)
MCV RBC AUTO: 89 FL (ref 84–94)
MCV RBC AUTO: 89 FL (ref 84–94)
PLATELET # BLD: 81 K/MM3 (ref 140–440)
PLATELET # BLD: 96 K/MM3 (ref 140–440)
RBC # BLD AUTO: 3.1 M/MM3 (ref 3.65–5.03)
RBC # BLD AUTO: 3.43 M/MM3 (ref 3.65–5.03)

## 2019-11-06 RX ADMIN — Medication SCH MG: at 09:32

## 2019-11-06 RX ADMIN — METOPROLOL SUCCINATE SCH: 100 TABLET, EXTENDED RELEASE ORAL at 23:29

## 2019-11-06 RX ADMIN — Medication SCH MG: at 16:52

## 2019-11-06 RX ADMIN — MORPHINE SULFATE PRN MG: 2 INJECTION, SOLUTION INTRAMUSCULAR; INTRAVENOUS at 09:31

## 2019-11-06 RX ADMIN — Medication SCH ML: at 10:39

## 2019-11-06 RX ADMIN — POTASSIUM CHLORIDE SCH: 10 INJECTION, SOLUTION INTRAVENOUS at 10:44

## 2019-11-06 RX ADMIN — ISOSORBIDE DINITRATE AND HYDRALAZINE HYDROCHLORIDE SCH EACH: 37.5; 2 TABLET ORAL at 21:14

## 2019-11-06 RX ADMIN — NIFEDIPINE SCH MG: 60 TABLET, EXTENDED RELEASE ORAL at 09:32

## 2019-11-06 RX ADMIN — ACETAMINOPHEN PRN MG: 325 TABLET ORAL at 14:30

## 2019-11-06 RX ADMIN — PANTOPRAZOLE SODIUM SCH MG: 40 TABLET, DELAYED RELEASE ORAL at 21:15

## 2019-11-06 RX ADMIN — Medication SCH MG: at 12:00

## 2019-11-06 RX ADMIN — MORPHINE SULFATE PRN MG: 2 INJECTION, SOLUTION INTRAMUSCULAR; INTRAVENOUS at 21:17

## 2019-11-06 RX ADMIN — METOPROLOL SUCCINATE SCH MG: 100 TABLET, EXTENDED RELEASE ORAL at 09:32

## 2019-11-06 RX ADMIN — AMIODARONE HYDROCHLORIDE SCH MG: 200 TABLET ORAL at 09:32

## 2019-11-06 RX ADMIN — PANTOPRAZOLE SODIUM SCH MG: 40 TABLET, DELAYED RELEASE ORAL at 10:38

## 2019-11-06 NOTE — PROGRESS NOTE
Assessment and Plan


Assessment and plan: 


Patient is a 58 yo man with a history of hypertension, CHF, CMP with ICD and 

Afib who presented to [facility] ED with coffee ground emesis and dark tarry stools. 





EGD Operative Report: 


DOS: 11/4/19


SURGEON: Milton Campo MD


EGD with hemostasis REPORT


PREOPERATIVE DIAGNOSIS and POSTOPERATIVE DIAGNOSIS: Upper GI bleed


ESTIMATED BLOOD LOSS: 10cc


DESCRIPTION OF PROCEDURE: A high-resolution EGD scope was passed through the 

oropharynx, esophagus, stomach, and second portion of duodenum. The scope was 

carefully withdrawn. Retroflexion was performed in the stomach. At the end of 

the procedure, the scope was cleaned using normal technique. Vital signs 

monitored continuously throughout.


SEDATION: Provided by Anesthesiology Services.


COMPLICATIONS: None.


FINDINGS: 


   Normal D2 of the duodenum


   Mild duodenitis of the duodenal bulb


   Moderate gastritis with erythema of the gastric antrum and body; given 

bleeding biopsies not obtained


   GE junction about 40cm from the incisors


   2cm Hiatal hernia


   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 

nodularity.  Biopsies not obtained due to bleeding


   Visible Suture just proximal to the GE junction


   Blood clot with active oozing of blood just proximal to the GE junction; area

was washed and suctioned and just appeared to be esophagitis.  Clip deployed 

with significant decrease in bleeding but still with small amount of oozing; 

therefore dilute epinephrine was used to inject around the site of bleeding, 1cc

lateral to the clip in 2 spots; with the second injection the bleeding from the 

original site stopped, but the injection site began to bleed and so a second 

clip was deployed and complete hemostasis was attained


 


RECOMMENDATIONS: 


   Complete 48 hours PPI drip then BID PPI


   Avoid solid food for 7 days


   Patient will REQUIRE repeat EGD in the near future for Olivarez's biopsies


   Avoid NSAIDs and blood thinners





Upper GIB due to PUD/GE junction: GI following, advance diet very slowly


Acute blood loss anemia due to GI hemorrhage: treat with PPI


NSVT: consult Cardiology, monitor Electrolyte closely


Atrial fibrillation with rapid ventricular response:  restart home Amiodarone 

and BBlocker, 


Headaches, typical daily HA in which he takes Ibuprofen: no more Ibuprofen, et. 

al... counseling done, use norco


AICD: Interrogation per Cardiology. 


Hypokalemia and hypomagnesemia: replete and recheck in am


Disposition: continue inpatient care, anticipated d/c tomorrow if h/h stable, 

repeat electrolytes. 








History


Interval history: 


Patient was seen and examined. Follow-up on current diagnosis of GIB. No 

overnight events reported to me. Patient denies any chest pain, shortness 

breath, nausea/vomiting or severe headaches. Imaging, nursing note, chart, labs 

and old chart reviewed. Discussed with patient. I sat down and explained to 

patient because He says no one is telling him anything but he can regurgitate 

exactly what is going on.





Hospitalist Physical





- Physical exam


Narrative exam: 


Gen: WDWN, NAD, Awake, Alert, Orientated 


HEENT: NCAT, EOMI, PERRL, OP Clear 


Neck: supple, no adenopathy, no thyromegaly, no JVD 


CVS/Heart: irregular normal S1S2, pulses present bilaterally 


Chest/Lungs: CTA B, Symmetrical chest expansion, good air entry bilaterally 


GI/Abdomen: soft, epigastric tenderness, good bowel sounds, no guarding or 

rebound 


/Bladder: no suprapubic tenderness, no CVA or paraspinal tenderness 


Extermity/Skin: no c/c/e, no obvious rash 


MSK: FROM x 4 


Neuro: CN 2-12 grossly intact, no new focal deficits 


Psych: calm 





- Constitutional


Vitals: 


                                        











Temp Pulse Resp BP Pulse Ox


 


 98.8 F   94 H  20   139/88   95 


 


 11/06/19 03:56  11/06/19 04:00  11/06/19 03:56  11/06/19 03:56  11/06/19 03:56











General appearance: Present: no acute distress





Results





- Labs


CBC & Chem 7: 


                                 11/06/19 04:36





                                 11/06/19 04:36


Labs: 


                             Laboratory Last Values











WBC  3.5 K/mm3 (4.5-11.0)  L  11/06/19  04:36    


 


RBC  3.10 M/mm3 (3.65-5.03)  L  11/06/19  04:36    


 


Hgb  9.3 gm/dl (11.8-15.2)  L  11/06/19  04:36    


 


Hct  27.5 % (35.5-45.6)  L  11/06/19  04:36    


 


MCV  89 fl (84-94)   11/06/19  04:36    


 


MCH  30 pg (28-32)   11/06/19  04:36    


 


MCHC  34 % (32-34)   11/06/19  04:36    


 


RDW  15.9 % (13.2-15.2)  H  11/06/19  04:36    


 


Plt Count  81 K/mm3 (140-440)  L  11/06/19  04:36    


 


Lymph % (Auto)  21.8 % (13.4-35.0)   11/05/19  07:55    


 


Mono % (Auto)  11.1 % (0.0-7.3)  H  11/05/19  07:55    


 


Eos % (Auto)  0.5 % (0.0-4.3)   11/05/19  07:55    


 


Baso % (Auto)  0.7 % (0.0-1.8)   11/05/19  07:55    


 


Lymph #  0.7 K/mm3 (1.2-5.4)  L  11/05/19  07:55    


 


Mono #  0.4 K/mm3 (0.0-0.8)   11/05/19  07:55    


 


Eos #  0.0 K/mm3 (0.0-0.4)   11/05/19  07:55    


 


Baso #  0.0 K/mm3 (0.0-0.1)   11/05/19  07:55    


 


Add Manual Diff  TNR   11/05/19  04:18    


 


Seg Neutrophils %  65.9 % (40.0-70.0)   11/05/19  07:55    


 


Seg Neutrophils #  2.2 K/mm3 (1.8-7.7)   11/05/19  07:55    


 


PT  13.2 Sec. (12.2-14.9)   11/05/19  04:18    


 


INR  1.01  (0.87-1.13)   11/05/19  04:18    


 


APTT  27.0 Sec. (24.2-36.6)   11/04/19  00:00    


 


Sodium  137 mmol/L (137-145)   11/06/19  04:36    


 


Potassium  3.0 mmol/L (3.6-5.0)  L  11/06/19  04:36    


 


Chloride  87.6 mmol/L ()  L  11/06/19  04:36    


 


Carbon Dioxide  34 mmol/L (22-30)  H  11/06/19  04:36    


 


Anion Gap  18 mmol/L  11/06/19  04:36    


 


BUN  18 mg/dL (9-20)   11/06/19  04:36    


 


Creatinine  0.9 mg/dL (0.8-1.5)   11/06/19  04:36    


 


Estimated GFR  > 60 ml/min  11/06/19  04:36    


 


BUN/Creatinine Ratio  20 %  11/06/19  04:36    


 


Glucose  103 mg/dL ()  H  11/06/19  04:36    


 


Calcium  9.0 mg/dL (8.4-10.2)   11/06/19  04:36    


 


Magnesium  1.30 mg/dL (1.7-2.3)  L  11/06/19  04:36    


 


Total Bilirubin  0.90 mg/dL (0.1-1.2)   11/03/19  21:12    


 


AST  167 units/L (5-40)  H  11/03/19  21:12    


 


ALT  95 units/L (7-56)  H  11/03/19  21:12    


 


Alkaline Phosphatase  81 units/L ()   11/03/19  21:12    


 


Total Protein  7.7 g/dL (6.3-8.2)   11/03/19  21:12    


 


Albumin  4.9 g/dL (3.9-5)   11/03/19  21:12    


 


Albumin/Globulin Ratio  1.8 %  11/03/19  21:12    


 


Triglycerides  58 mg/dL (2-149)   11/06/19  04:36    


 


Cholesterol  120 mg/dL ()   11/06/19  04:36    


 


LDL Cholesterol Direct  47 mg/dL ()  L  11/06/19  04:36    


 


HDL Cholesterol  60 mg/dL (40-59)  H  11/06/19  04:36    


 


Cholesterol/HDL Ratio  2.00 %  11/06/19  04:36    


 


Lipase  25 units/L (13-60)   11/03/19  21:12    


 


TSH  2.210 mlU/mL (0.270-4.200)   11/06/19  04:36    


 


Urine Color  Yellow  (Yellow)   11/03/19  01:31 EST    


 


Urine Turbidity  Clear  (Clear)   11/03/19  01:31 EST    


 


Urine pH  6.0  (5.0-7.0)   11/03/19  01:31 EST    


 


Ur Specific Gravity  1.023  (1.003-1.030)   11/03/19  01:31 EST    


 


Urine Protein  100 mg/dl mg/dL (Negative)   11/03/19  01:31 EST    


 


Urine Glucose (UA)  Neg mg/dL (Negative)   11/03/19  01:31 EST    


 


Urine Ketones  80 mg/dL (Negative)   11/03/19  01:31 EST    


 


Urine Blood  Mod  (Negative)   11/03/19  01:31 EST    


 


Urine Nitrite  Neg  (Negative)   11/03/19  01:31 EST    


 


Urine Bilirubin  Neg  (Negative)   11/03/19  01:31 EST    


 


Urine Urobilinogen  < 2.0 mg/dL (<2.0)   11/03/19  01:31 EST    


 


Ur Leukocyte Esterase  Neg  (Negative)   11/03/19  01:31 EST    


 


Urine WBC (Auto)  1.0 /HPF (0.0-6.0)   11/03/19  01:31 EST    


 


Urine RBC (Auto)  1.0 /HPF (0.0-6.0)   11/03/19  01:31 EST    


 


U Epithel Cells (Auto)  < 1.0 /HPF (0-13.0)   11/03/19  01:31 EST    


 


Hyaline Casts  1 /LPF  11/03/19  01:31 EST    


 


Urine Mucus  Few /HPF  11/03/19  01:31 EST    


 


Blood Type  O POSITIVE   11/03/19  00:00    


 


Antibody Screen  Negative   11/03/19  00:00    














Active Medications





- Current Medications


Current Medications: 














Generic Name Dose Route Start Last Admin





  Trade Name Freq  PRN Reason Stop Dose Admin


 


Acetaminophen  650 mg  11/04/19 03:47  11/04/19 12:42





  Tylenol  PO   650 mg





  Q4H PRN   Administration





  Pain MILD(1-3)/Fever >100.5/HA  


 


Acetaminophen/Hydrocodone Bitart  1 each  11/06/19 10:18 





  [place] 5/325  PO  





  Q4H PRN  





  Pain, Moderate (4-6)  


 


Amiodarone HCl  200 mg  11/05/19 17:00  11/06/19 09:32





  Cordarone  PO   200 mg





  DAILY JORDANA   Administration


 


Hydralazine HCl  10 mg  11/04/19 17:07 





  Apresoline  IV  





  Q4HR PRN  





  BP >160/100  


 


Magnesium Sulfate  2 gm in 50 mls @ 25 mls/hr  11/06/19 10:16 





  Magnesium Sulfate 2gm/50ml  IV  11/06/19 12:15 





  ONCE ONE  


 


Metoprolol Succinate  100 mg  11/05/19 22:00  11/06/19 09:32





  Metoprolol Xl  PO   100 mg





  BID JORDANA   Administration


 


Morphine Sulfate  2 mg  11/04/19 17:07  11/06/19 09:31





  Morphine  IV   2 mg





  Q4H PRN   Administration





  Pain, Moderate (4-6)  


 


Nifedipine  60 mg  11/06/19 10:00  11/06/19 09:32





  Procardia Xl  PO   60 mg





  DAILY JORDANA   Administration


 


Ondansetron HCl  4 mg  11/04/19 03:47  11/04/19 22:17





  Zofran  IV   4 mg





  Q8H PRN   Administration





  Nausea And Vomiting  


 


Pantoprazole Sodium  40 mg  11/06/19 10:00 





  Protonix  PO  





  BID JORDANA  


 


Potassium Chloride  40 meq  11/06/19 10:16 





  K-Dur  PO  11/06/19 10:17 





  ONCE ONE  


 


Potassium Chloride  40 meq  11/06/19 14:00 





  K-Dur  PO  11/06/19 14:01 





  ONCE ONE  


 


Sodium Chloride  10 ml  11/04/19 10:00  11/05/19 21:39





  Sodium Chloride Flush Syringe 10 Ml  IV   10 ml





  BID JORDANA   Administration


 


Sodium Chloride  10 ml  11/04/19 03:47 





  Sodium Chloride Flush Syringe 10 Ml  IV  





  PRN PRN  





  LINE FLUSH  


 


Thiamine HCl  100 mg  11/06/19 10:00  11/06/19 09:32





  Vitamin B-1  PO   100 mg





  QDAY JORDANA   Administration














Nutrition/Malnutrition Assess





- Dietary Evaluation


Nutrition/Malnutrition Findings: 


                                        





Nutrition Notes                                            Start:  11/05/19 

10:09


Freq:                                                      Status: Active       




Protocol:                                                                       




 Document     11/05/19 10:10  DW  (Rec: 11/05/19 10:13  DW  [id number])


 Co-Sign      11/05/19 10:10  LP


 Nutrition Notes


     Need for Assessment generated from:         Low BMI


     Initial or Follow up                        Brief Note


     Height                                      6 ft 1 in


     Weight                                      73.4 kg


     Ideal Body Weight (kg)                      83.63


     BMI                                         21.3


     Subjective/Other Information                Low BMI screen


                                                 Upon arrival writer weighed pt


                                                 at 73.4 kg.


 Nutrition Intervention


     Revisit per MD consult or patient           Sign Off


      request:

## 2019-11-06 NOTE — GASTROENTEROLOGY PROGRESS NOTE
Assessment and Plan





Patient seen and examined.  I have reviewed the advanced practitioner's 

evaluation, assessment, and plan, and agree with them.  I note the following 

additions:


No overt bleeding, but Hgb did decline this AM; unclear if artifactual vs true 

drop.


Recommend repeat CBC this afternoon (order placed)


If Hgb improving then may advance diet to soft mechanical (do not advance beyond

that for another 5 days) and discharge home on BID PPI





Pt will need to f/u with me in clinic in 1-2 weeks








Regarding abd pain, TTP but no peritoneal signs and the EGD was low risk for any

complications so would not anticipate perforation etc.  Cont PPI and monitor, 

but if abd pain worsens would recommend imaging to be thorough





- Patient Problems


(1) Abdominal pain


Current Visit: Yes   Status: Acute   





(2) Upper GI bleed


Current Visit: Yes   Status: Acute   





Subjective


Date of service: 11/06/19


Principal diagnosis: UGIB


Interval history: 





Patient reports dark stool but also reports if anything he is constipated, so no

active melena


Does report abd pain and chest discomfort post EGD





Objective





- Constitutional


Vitals: 


                                        











Temp Pulse Resp BP Pulse Ox


 


 98.8 F   94 H  20   139/88   95 


 


 11/06/19 03:56  11/06/19 04:00  11/06/19 03:56  11/06/19 03:56  11/06/19 03:56











General appearance: no acute distress





- EENT


Eyes: EOM intact





- Respiratory


Respiratory effort: normal





- Gastrointestinal


General gastrointestinal: Present: tender





- Neurologic


Neurological: alert and oriented x3





- Psychiatric


Psychiatric: appropriate mood/affect





- Labs


CBC & Chem 7: 


                                 11/06/19 04:36





                                 11/06/19 04:36


Labs: 


                         Laboratory Results - last 24 hr











  11/05/19 11/06/19 11/06/19





  12:49 04:36 04:36


 


WBC   3.5 L 


 


RBC   3.10 L 


 


Hgb  11.0 L  9.3 L 


 


Hct  32.7 L  27.5 L 


 


MCV   89 


 


MCH   30 


 


MCHC   34 


 


RDW   15.9 H 


 


Plt Count   81 L 


 


Sodium    137


 


Potassium    3.0 L


 


Chloride    87.6 L


 


Carbon Dioxide    34 H


 


Anion Gap    18


 


BUN    18


 


Creatinine    0.9


 


Estimated GFR    > 60


 


BUN/Creatinine Ratio    20


 


Glucose    103 H


 


Calcium    9.0


 


Magnesium    1.30 L


 


Triglycerides    58


 


Cholesterol    120


 


LDL Cholesterol Direct    47 L


 


HDL Cholesterol    60 H


 


Cholesterol/HDL Ratio    2.00


 


TSH   














  11/06/19





  04:36


 


WBC 


 


RBC 


 


Hgb 


 


Hct 


 


MCV 


 


MCH 


 


MCHC 


 


RDW 


 


Plt Count 


 


Sodium 


 


Potassium 


 


Chloride 


 


Carbon Dioxide 


 


Anion Gap 


 


BUN 


 


Creatinine 


 


Estimated GFR 


 


BUN/Creatinine Ratio 


 


Glucose 


 


Calcium 


 


Magnesium 


 


Triglycerides 


 


Cholesterol 


 


LDL Cholesterol Direct 


 


HDL Cholesterol 


 


Cholesterol/HDL Ratio 


 


TSH  2.210

## 2019-11-06 NOTE — CONSULTATION
History of Present Illness


Consult date: 11/06/19


Consult reason: other (NSVT)


History of present illness: 





This is a 59-year old male with a history of dilated nonischemic cardiomyopathy,

EF 25%, paroxysmal atrial fibrillation and paroxysmal ventricular tachycardia. 

He is on Eliquis for oral anticoagulation. Patient also has a single chamber 

Biotronik ICD. He presented 11/3 and admitted with GI bleed. Eliquis has been 

discontinued.  Patient has undergone GI evaluation and workup. 





A cardiology consultation has been requested for ectopy seen on telemetry. His 

presenting ECG is rapid atrial fibrillation. Telemetry strips shows atrial 

fibrillation with a short burst of NSVT. It's noted the patient's home 

amiodarone and metoprolol were not resume on admission. There were no reports of

chest pain, shortness of breath, dizziness or palpitations. Labs today measures 

a potassium at 3.0. Patient denies AICD discharge since his admission but 

reports an ICD discharge a week ago. 





Past History


Past Medical History: atrial fib, heart failure, hypertension


Social history: no significant social history





Medications and Allergies


                                    Allergies











Allergy/AdvReac Type Severity Reaction Status Date / Time


 


No Known Allergies Allergy   Verified 03/27/17 02:41











                                Home Medications











 Medication  Instructions  Recorded  Confirmed  Last Taken  Type


 


Gabapentin 600 mg PO QHS 12/22/18 11/04/19 12/22/18 History


 


Metoprolol Xl [Metoprolol 100 mg PO BID 12/22/18 11/04/19 12/21/18 History





SUCCINATE ER TAB]     


 


NIFEdipine [Nifedipine ER] 60 mg PO DAILY 12/22/18 11/04/19 12/21/18 History


 


traZODone [Desyrel] 50 mg PO QHS 12/22/18 11/04/19 12/21/18 History


 


Amiodarone HCl [Amiodarone 100  mg PO DAILY 11/04/19 11/04/19 Unknown 

History





TAB]     


 


Apixaban [Eliquis] 5 mg PO DAILY 11/04/19 11/04/19 Unknown History


 


Aspirin [Adult Aspirin] 81 mg PO DAILY 11/04/19 11/04/19 Unknown History


 


Calcium Carb/Vit D3/Minerals 1 each PO DAILY 11/04/19 11/04/19 Unknown History





[Caltrate Plus]     


 


Cyanocobalamin [Vitamin B-12] 1,000 mcg PO DAILY 11/04/19 11/04/19 Unknown 

History


 


Isosorb Dinit/Hydralazine [Bidil 20 mg PO BID 11/04/19 11/04/19 Unknown History





20/37.5MG]     


 


Lisinopril [Zestril TAB] 10 mg PO QDAY 11/04/19 11/04/19 Unknown History


 


Prochlorperazine [Compazine] 10 mg PO Q6HR 11/04/19 11/04/19 Unknown History


 


Thiamine [Vitamin B-1] 100 mg PO QDAY 11/04/19 11/04/19 Unknown History











Active Meds: 


Active Medications





Acetaminophen (Tylenol)  650 mg PO Q4H PRN


   PRN Reason: Pain MILD(1-3)/Fever >100.5/HA


   Last Admin: 11/04/19 12:42 Dose:  650 mg


   Documented by: 


Amiodarone HCl (Cordarone)  200 mg PO DAILY [facility]


   Last Admin: 11/06/19 09:32 Dose:  200 mg


   Documented by: 


Hydralazine HCl (Apresoline)  10 mg IV Q4HR PRN


   PRN Reason: BP >160/100


Metoprolol Succinate (Metoprolol Xl)  100 mg PO BID [facility]


   Last Admin: 11/06/19 09:32 Dose:  100 mg


   Documented by: 


Morphine Sulfate (Morphine)  2 mg IV Q4H PRN


   PRN Reason: Pain, Moderate (4-6)


   Last Admin: 11/06/19 09:31 Dose:  2 mg


   Documented by: 


Nifedipine (Procardia Xl)  60 mg PO DAILY [facility]


   Last Admin: 11/06/19 09:32 Dose:  60 mg


   Documented by: 


Ondansetron HCl (Zofran)  4 mg IV Q8H PRN


   PRN Reason: Nausea And Vomiting


   Last Admin: 11/04/19 22:17 Dose:  4 mg


   Documented by: 


Pantoprazole Sodium (Protonix)  40 mg PO BID [facility]


Sodium Chloride (Sodium Chloride Flush Syringe 10 Ml)  10 ml IV BID [facility]


   Last Admin: 11/05/19 21:39 Dose:  10 ml


   Documented by: 


Sodium Chloride (Sodium Chloride Flush Syringe 10 Ml)  10 ml IV PRN PRN


   PRN Reason: LINE FLUSH


Thiamine HCl (Vitamin B-1)  100 mg PO QDAY [facility]


   Last Admin: 11/06/19 09:32 Dose:  100 mg


   Documented by: 











Physical Examination


                                   Vital Signs











Temp Pulse Resp BP Pulse Ox


 


 98.8 F   73   18   173/85   98 


 


 11/03/19 20:46  11/03/19 20:46  11/03/19 20:46  11/03/19 20:46  11/03/19 20:46











General appearance: no acute distress


HEENT: Positive: PERRL


Neck: Positive: trachea midline


Cardiac: Positive: irregularly irregular


Lungs: Positive: Decreased Breath Sounds


Neuro: Positive: Grossly Intact


Extremities: Absent: edema





Results





                                 11/06/19 04:36





                                 11/06/19 04:36


                                     Lipids











  11/06/19 Range/Units





  04:36 


 


Triglycerides  58  (2-149)  mg/dL


 


Cholesterol  120  ()  mg/dL


 


HDL Cholesterol  60 H  (40-59)  mg/dL


 


Cholesterol/HDL Ratio  2.00  %








                                       CBC











  11/05/19 11/06/19 Range/Units





  12:49 04:36 


 


WBC   3.5 L  (4.5-11.0)  K/mm3


 


RBC   3.10 L  (3.65-5.03)  M/mm3


 


Hgb  11.0 L  9.3 L  (11.8-15.2)  gm/dl


 


Hct  32.7 L  27.5 L  (35.5-45.6)  %


 


Plt Count   81 L  (140-440)  K/mm3








                          Comprehensive Metabolic Panel











  11/06/19 Range/Units





  04:36 


 


Sodium  137  (137-145)  mmol/L


 


Potassium  3.0 L  (3.6-5.0)  mmol/L


 


Chloride  87.6 L  ()  mmol/L


 


Carbon Dioxide  34 H  (22-30)  mmol/L


 


BUN  18  (9-20)  mg/dL


 


Creatinine  0.9  (0.8-1.5)  mg/dL


 


Glucose  103 H  ()  mg/dL


 


Calcium  9.0  (8.4-10.2)  mg/dL














Assessment and Plan





GI bleed


Hypokalemia


Paroxysmal Afib


   currently in sinus rhythm; amiodarone and metoprolol restarted


   Eliquis discontinued due to GI bleed


Hx of Ventricular tachycardia


Hx of Nonischemic cardiomyopathy, EF 25%


Presence of Biotronik AICD


Hypertension





Replace potassium.


Continue medical therapy for paroxysmal atrial fibrillation and paroxysmal 

ventricular tachycardia.


Resume medical therapy for nonischemic cardiomyopathy.

## 2019-11-07 VITALS — DIASTOLIC BLOOD PRESSURE: 66 MMHG | SYSTOLIC BLOOD PRESSURE: 107 MMHG

## 2019-11-07 LAB
BUN SERPL-MCNC: 12 MG/DL (ref 9–20)
BUN/CREAT SERPL: 13 %
CALCIUM SERPL-MCNC: 9.4 MG/DL (ref 8.4–10.2)
HEMOLYSIS INDEX: 5

## 2019-11-07 RX ADMIN — AMIODARONE HYDROCHLORIDE SCH MG: 200 TABLET ORAL at 09:05

## 2019-11-07 RX ADMIN — Medication SCH MG: at 11:51

## 2019-11-07 RX ADMIN — Medication SCH ML: at 09:10

## 2019-11-07 RX ADMIN — METOPROLOL SUCCINATE SCH MG: 100 TABLET, EXTENDED RELEASE ORAL at 09:05

## 2019-11-07 RX ADMIN — PANTOPRAZOLE SODIUM SCH MG: 40 TABLET, DELAYED RELEASE ORAL at 09:04

## 2019-11-07 RX ADMIN — ISOSORBIDE DINITRATE AND HYDRALAZINE HYDROCHLORIDE SCH EACH: 37.5; 2 TABLET ORAL at 09:03

## 2019-11-07 RX ADMIN — NIFEDIPINE SCH MG: 60 TABLET, EXTENDED RELEASE ORAL at 09:04

## 2019-11-07 RX ADMIN — Medication SCH MG: at 09:04

## 2019-11-07 RX ADMIN — Medication SCH MG: at 08:19

## 2019-11-07 NOTE — DISCHARGE SUMMARY
Providers





- Providers


Date of Admission: 


11/04/19 06:12





Date of discharge: 11/07/19


Attending physician: 


PILO BETANCOURT





                                        





11/04/19 01:50


Consult to Physician [CONS] Urgent 


   Comment: 


   Consulting Provider: MIKA LEON


   Physician Instructions: 


   Reason For Exam: upper GI bleed





11/05/19 16:57


Consult to Physician [CONS] Routine 


   Comment: 


   Consulting Provider: SEBASTIEN ADAMS


   Physician Instructions: 


   Reason For Exam: NSVT











Primary care physician: 


PRIMARY CARE MD








Hospitalization


Condition: Stable


Hospital course: 


Patient is a 58 yo man with a history of hypertension, CHF, CMP with ICD and 

Afib who presented to [facility] ED with coffee ground emesis and dark tarry stools. 





EGD Operative Report: 


DOS: 11/4/19


SURGEON: Mika Leon MD


EGD with hemostasis REPORT


PREOPERATIVE DIAGNOSIS and POSTOPERATIVE DIAGNOSIS: Upper GI bleed


ESTIMATED BLOOD LOSS: 10cc


DESCRIPTION OF PROCEDURE: A high-resolution EGD scope was passed through the 

oropharynx, esophagus, stomach, and second portion of duodenum. The scope was 

carefully withdrawn. Retroflexion was performed in the stomach. At the end of 

the procedure, the scope was cleaned using normal technique. Vital signs 

monitored continuously throughout.


SEDATION: Provided by [facility].


COMPLICATIONS: None.


FINDINGS: 


   Normal D2 of the duodenum


   Mild duodenitis of the duodenal bulb


   Moderate gastritis with erythema of the gastric antrum and body; given 

bleeding biopsies not obtained


   GE junction about 40cm from the incisors


   2cm Hiatal hernia


   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 

nodularity.  Biopsies not obtained due to bleeding


   Visible Suture just proximal to the GE junction


   Blood clot with active oozing of blood just proximal to the GE junction; area

 was washed and suctioned and just appeared to be esophagitis.  Clip deployed 

with significant decrease in bleeding but still with small amount of oozing; the

refore dilute epinephrine was used to inject around the site of bleeding, 1cc 

lateral to the clip in 2 spots; with the second injection the bleeding from the 

original site stopped, but the injection site began to bleed and so a second 

clip was deployed and complete hemostasis was attained


 RECOMMENDATIONS: 


   Complete 48 hours PPI drip then BID PPI


   Avoid solid food for 7 days


   Patient will REQUIRE repeat EGD in the near future for Olivarez's biopsies


   Avoid NSAIDs and blood thinners





Discharge Diagnoses:


Upper GIB due to PUD/GE junction: GI following, advance diet very slowly


Acute blood loss anemia due to GI hemorrhage: treat with PPI


NSVT: consult Cardiology, monitor Electrolyte closely


Atrial fibrillation with rapid ventricular response:  restart home Amiodarone 

and BBlocker, Eliquis on hold due to GI bleed


Headaches, typical daily HA in which he takes Ibuprofen: no more Ibuprofen, et. 

al... counseling done, use norco


AICD: Interrogation per Cardiology. 


Hypokalemia and hypomagnesemia: replete and recheck in am


Disposition: d/c home





Disposition: DC-01 TO HOME OR SELFCARE


Time spent for discharge: 34 minutes





Core Measure Documentation





- Palliative Care


Palliative Care/ Comfort Measures: Not Applicable





- Core Measures


Any of the following diagnoses?: none





- VTE Discharge Requirements


Deep Vein Thrombosis/Pulmonary Embolism Present on Admission: No


Has pt received <5 days of overlap therapy or INR<2.0: No


Anticoagulant overlap therapy prescribed at discharge: No


Contraindication No Overlap Therapy order at DC: Not Indicated





Exam





- Physical Exam


Narrative exam: 


Gen: WDWN, NAD, Awake, Alert, Orientated 


HEENT: NCAT, EOMI, PERRL, OP Clear 


Neck: supple, no adenopathy, no thyromegaly, no JVD 


CVS/Heart: irregular normal S1S2, pulses present bilaterally 


Chest/Lungs: CTA B, Symmetrical chest expansion, good air entry bilaterally 


GI/Abdomen: soft, epigastric tenderness, good bowel sounds, no guarding or 

rebound 


/Bladder: no suprapubic tenderness, no CVA or paraspinal tenderness 


Extermity/Skin: no c/c/e, no obvious rash 


MSK: FROM x 4 


Neuro: CN 2-12 grossly intact, no new focal deficits 


Psych: calm 





- Constitutional


Vitals: 


                                        











Temp Pulse Resp BP Pulse Ox


 


 98.2 F   86   18   107/66   97 


 


 11/07/19 11:09  11/07/19 11:09  11/07/19 11:09  11/07/19 11:09  11/07/19 11:09














Plan


Activity: other (no strenous activity unless cleared by GI)


Diet: other (no solid foods for 7 days)


Additional Instructions: No Aspirin or Eliquis or blood thinner until cleared by

 Stomach doctor.  NEVER Ibuprofen, Motrin, BC Goody, Goody power, Aleve, 

Naprosyn, Naproxen, Excedrin Migraine. Notify your primary doctor of pain 

medications


Follow up with: 


MIKA LEON MD [Staff Physician] - 7 Days


SEBASTIEN ADAMS MD [Staff Physician] - 7 Days


ANNA TERRAZAS MD [Staff Physician] - 7 Days


Prescriptions: 


Spironolactone [Aldactone] 25 mg PO QDAY #30 tablet


Amiodarone [Cordarone 200 MG TAB] 200 mg PO BID #60 tablet


Magnesium Oxide [Mag-Ox] 400 mg PO TIDAC #60 tablet


HYDROcodone/APAP 5-325 [Norco 5-325 mg TAB] 1 each PO Q4H PRN #20 tablet


 PRN Reason: Pain, Moderate (4-6)


Pantoprazole [Protonix TAB] 40 mg PO BID #60 tablet


Thiamine [Vitamin B-1] 100 mg PO QDAY #30 tab

## 2019-11-07 NOTE — PROGRESS NOTE
Assessment and Plan





GI bleed


Hypokalemia -replaced


Paroxysmal Afib


   currently in sinus rhythm; on amiodarone and metoprolol for suppression


   Eliquis discontinued due to GI bleed


Hx of Ventricular tachycardia


Hx of Nonischemic cardiomyopathy, EF 25%


Presence of [facility] AICD


   interrogation revealed multiple ICD discharge on 11/3 for rapid atrial 

fibrillation


   amiodarone and metoprolol has since been restarted


Hypertension








Continue medical therapy for paroxysmal atrial fibrillation and paroxysmal 

ventricular tachycardia. We will increase amiodarone to twice a day.


Continue medical therapy for nonischemic cardiomyopathy.


Stable cardiac wise. Patient advised to follow up in our office within 3-5 days 

after discharge.





Subjective


Date of service: 11/07/19


Principal diagnosis: UGIB


Interval history: 





Patient has no complaints. Wants to go home today.


Stable sinus rhythm on telemetry.





Objective


                                   Vital Signs











  Temp Pulse Resp BP Pulse Ox


 


 11/07/19 10:35   96 H   


 


 11/07/19 09:05   76   137/86 


 


 11/07/19 09:04   76   137/86 


 


 11/07/19 09:03   76   137/86 


 


 11/07/19 07:25  99.8 F H  85  18  121/80  96


 


 11/07/19 04:16  98.7 F  87  18  131/83  96


 


 11/07/19 00:00   105 H   


 


 11/06/19 23:29     104/72 


 


 11/06/19 23:24  98.5 F  86  18  102/64  98


 


 11/06/19 19:29  98.7 F  85  16  104/72  96


 


 11/06/19 17:17  98.4 F  88  20  93/64  94














- Physical Examination


General: No Apparent Distress


HEENT: Positive: PERRL


Neck: Positive: trachea midline


Cardiac: Positive: Reg Rate and Rhythm


Lungs: Positive: Decreased Breath Sounds


Neuro: Positive: Grossly Intact


Extremities: Absent: edema





- Labs and Meds


                          Comprehensive Metabolic Panel











  11/07/19 Range/Units





  04:35 


 


Sodium  135 L  (137-145)  mmol/L


 


Potassium  3.8  D  (3.6-5.0)  mmol/L


 


Chloride  91.4 L  ()  mmol/L


 


Carbon Dioxide  32 H  (22-30)  mmol/L


 


BUN  12  (9-20)  mg/dL


 


Creatinine  0.9  (0.8-1.5)  mg/dL


 


Glucose  109 H  ()  mg/dL


 


Calcium  9.4  (8.4-10.2)  mg/dL

## 2019-11-07 NOTE — GASTROENTEROLOGY PROGRESS NOTE
<ZBIGNIEW HURLEY - Last Filed: 11/07/19 11:08>





Assessment and Plan


1.UGIB





-H/H 10.1/30.5-stable


-continue to monitor H/H and transfuse as needed


-s/p EGD 11/4/19 that showed: 


* Normal D2 of the duodenum


* Mild duodenitis of the duodenal bulb


* Moderate gastritis with erythema of the gastric antrum and body; given 

  bleeding biopsies not obtained


* GE junction about 40cm from the incisors


* 2cm Hiatal hernia


* Likely long segment Olivarez's esophagus, C9M10, with some localized areas of 

  nodularity.  Biopsies not obtained due to bleeding


* Visible Suture just proximal to the GE junction


* Blood clot with active oozing of blood just proximal to the GE junction; area 

  was washed and suctioned and just appeared to be esophagitis.  Clip deployed 

  with significant decrease in bleeding but still with small amount of oozing; 

  therefore dilute epinephrine was used to inject around the site of bleeding, 

  1cc lateral to the clip in 2 spots; with the second injection the bleeding 

  from the original site stopped, but the injection site began to bleed and so a

  second clip was deployed and complete hemostasis was attained





-clinically, patient is stable with no active signs of bleeding overnight or 

this am. Denies abd pain or N/V.


-okay to advance diet to soft diet (would not advance further x 5 days)


-continue PPI BID 


-avoid NSAIDs or blood thinning medications


-continue supportive care


-patient okay to be d/c per GI standpoint on PPI as above with f/u in clinic in 

1-2 weeks for further management as outpatient (will need repeat EGD in the near

future as outpatient for Olivarez's biopsies)


-will sign off, please call if needed











Subjective


Date of service: 11/07/19


Principal diagnosis: UGIB


Interval history: 


No acute distress or active signs of bleeding overnight or this am per pt.








Objective





- Constitutional


Vitals: 


                                        











Temp Pulse Resp BP Pulse Ox


 


 99.8 F H  76   18   137/86   96 


 


 11/07/19 07:25  11/07/19 09:05  11/07/19 07:25  11/07/19 09:05  11/07/19 07:25











General appearance: no acute distress





- EENT


Eyes: PERRL, EOM intact


ENT: hearing intact





- Respiratory


Respiratory effort: normal





- Cardiovascular


Rhythm: regular





- Gastrointestinal


General gastrointestinal: Present: soft, non-tender, non-distended, normal bowel

sounds





- Integumentary


Integumentary: Present: warm, dry





- Neurologic


Neurological: alert and oriented x3





- Labs


CBC & Chem 7: 


                                 11/06/19 10:45





                                 11/07/19 04:35


Labs: 


                         Laboratory Results - last 24 hr











  11/06/19 11/07/19





  10:45 04:35


 


WBC  4.4 L 


 


RBC  3.43 L 


 


Hgb  10.1 L 


 


Hct  30.5 L 


 


MCV  89 


 


MCH  29 


 


MCHC  33 


 


RDW  15.7 H 


 


Plt Count  96 L 


 


Sodium   135 L


 


Potassium   3.8  D


 


Chloride   91.4 L


 


Carbon Dioxide   32 H


 


Anion Gap   15


 


BUN   12


 


Creatinine   0.9


 


Estimated GFR   > 60


 


BUN/Creatinine Ratio   13


 


Glucose   109 H


 


Calcium   9.4


 


Magnesium   1.30 L














<MIKA LEON - Last Filed: 11/07/19 22:58>





Assessment and Plan





Patient seen and examined.  I have reviewed the advanced practitioner's 

evaluation, assessment, and plan, and agree with them.  I note the following 

additions:


patient improving ok for discharge with OPD fu





- Patient Problems


(1) Abdominal pain


Status: Acute   





(2) Upper GI bleed


Status: Acute   





Objective





- Constitutional


Vitals: 


                                        











Temp Pulse Resp BP Pulse Ox


 


 98.2 F   81   18   107/66   97 


 


 11/07/19 11:09  11/07/19 12:00  11/07/19 11:09  11/07/19 11:09  11/07/19 11:09














- Labs


CBC & Chem 7: 


                                 11/06/19 10:45





                                 11/07/19 04:35


Labs: 


                         Laboratory Results - last 24 hr











  11/07/19





  04:35


 


Sodium  135 L


 


Potassium  3.8  D


 


Chloride  91.4 L


 


Carbon Dioxide  32 H


 


Anion Gap  15


 


BUN  12


 


Creatinine  0.9


 


Estimated GFR  > 60


 


BUN/Creatinine Ratio  13


 


Glucose  109 H


 


Calcium  9.4


 


Magnesium  1.30 L

## 2019-12-04 ENCOUNTER — HOSPITAL ENCOUNTER (INPATIENT)
Dept: HOSPITAL 5 - ED | Age: 59
LOS: 3 days | Discharge: HOME | DRG: 683 | End: 2019-12-07
Attending: HOSPITALIST | Admitting: INTERNAL MEDICINE
Payer: MEDICARE

## 2019-12-04 DIAGNOSIS — E86.0: ICD-10-CM

## 2019-12-04 DIAGNOSIS — Z79.899: ICD-10-CM

## 2019-12-04 DIAGNOSIS — G89.4: ICD-10-CM

## 2019-12-04 DIAGNOSIS — G43.909: ICD-10-CM

## 2019-12-04 DIAGNOSIS — G47.00: ICD-10-CM

## 2019-12-04 DIAGNOSIS — Z82.49: ICD-10-CM

## 2019-12-04 DIAGNOSIS — M62.82: ICD-10-CM

## 2019-12-04 DIAGNOSIS — N17.9: Primary | ICD-10-CM

## 2019-12-04 DIAGNOSIS — I11.0: ICD-10-CM

## 2019-12-04 DIAGNOSIS — M19.90: ICD-10-CM

## 2019-12-04 DIAGNOSIS — F10.129: ICD-10-CM

## 2019-12-04 DIAGNOSIS — Z95.810: ICD-10-CM

## 2019-12-04 DIAGNOSIS — R55: ICD-10-CM

## 2019-12-04 DIAGNOSIS — K21.9: ICD-10-CM

## 2019-12-04 DIAGNOSIS — I50.22: ICD-10-CM

## 2019-12-04 DIAGNOSIS — I42.0: ICD-10-CM

## 2019-12-04 DIAGNOSIS — I48.0: ICD-10-CM

## 2019-12-04 DIAGNOSIS — K59.00: ICD-10-CM

## 2019-12-04 LAB
ALBUMIN SERPL-MCNC: 4.3 G/DL (ref 3.9–5)
ALT SERPL-CCNC: 45 UNITS/L (ref 7–56)
APTT BLD: 34.8 SEC. (ref 24.2–36.6)
BASOPHILS # (AUTO): 0 K/MM3 (ref 0–0.1)
BASOPHILS NFR BLD AUTO: 0.7 % (ref 0–1.8)
BUN SERPL-MCNC: 26 MG/DL (ref 9–20)
BUN/CREAT SERPL: 7 %
CALCIUM SERPL-MCNC: 9.5 MG/DL (ref 8.4–10.2)
EOSINOPHIL # BLD AUTO: 0 K/MM3 (ref 0–0.4)
EOSINOPHIL NFR BLD AUTO: 0.3 % (ref 0–4.3)
HCT VFR BLD CALC: 34 % (ref 35.5–45.6)
HDLC SERPL-MCNC: 111 MG/DL (ref 40–59)
HEMOLYSIS INDEX: 6
HGB BLD-MCNC: 11 GM/DL (ref 11.8–15.2)
INR PPP: 1.05 (ref 0.87–1.13)
LYMPHOCYTES # BLD AUTO: 0.8 K/MM3 (ref 1.2–5.4)
LYMPHOCYTES NFR BLD AUTO: 14.4 % (ref 13.4–35)
MCHC RBC AUTO-ENTMCNC: 33 % (ref 32–34)
MCV RBC AUTO: 89 FL (ref 84–94)
MONOCYTES # (AUTO): 0.5 K/MM3 (ref 0–0.8)
MONOCYTES % (AUTO): 8.4 % (ref 0–7.3)
PLATELET # BLD: 230 K/MM3 (ref 140–440)
RBC # BLD AUTO: 3.81 M/MM3 (ref 3.65–5.03)

## 2019-12-04 PROCEDURE — 82550 ASSAY OF CK (CPK): CPT

## 2019-12-04 PROCEDURE — 82962 GLUCOSE BLOOD TEST: CPT

## 2019-12-04 PROCEDURE — 85730 THROMBOPLASTIN TIME PARTIAL: CPT

## 2019-12-04 PROCEDURE — 84484 ASSAY OF TROPONIN QUANT: CPT

## 2019-12-04 PROCEDURE — G0480 DRUG TEST DEF 1-7 CLASSES: HCPCS

## 2019-12-04 PROCEDURE — 83735 ASSAY OF MAGNESIUM: CPT

## 2019-12-04 PROCEDURE — 36415 COLL VENOUS BLD VENIPUNCTURE: CPT

## 2019-12-04 PROCEDURE — G0378 HOSPITAL OBSERVATION PER HR: HCPCS

## 2019-12-04 PROCEDURE — 80320 DRUG SCREEN QUANTALCOHOLS: CPT

## 2019-12-04 PROCEDURE — 80061 LIPID PANEL: CPT

## 2019-12-04 PROCEDURE — 82553 CREATINE MB FRACTION: CPT

## 2019-12-04 PROCEDURE — 93306 TTE W/DOPPLER COMPLETE: CPT

## 2019-12-04 PROCEDURE — 70450 CT HEAD/BRAIN W/O DYE: CPT

## 2019-12-04 PROCEDURE — 80053 COMPREHEN METABOLIC PANEL: CPT

## 2019-12-04 PROCEDURE — 93010 ELECTROCARDIOGRAM REPORT: CPT

## 2019-12-04 PROCEDURE — 80307 DRUG TEST PRSMV CHEM ANLYZR: CPT

## 2019-12-04 PROCEDURE — 85025 COMPLETE CBC W/AUTO DIFF WBC: CPT

## 2019-12-04 PROCEDURE — 76770 US EXAM ABDO BACK WALL COMP: CPT

## 2019-12-04 PROCEDURE — 93005 ELECTROCARDIOGRAM TRACING: CPT

## 2019-12-04 PROCEDURE — 72125 CT NECK SPINE W/O DYE: CPT

## 2019-12-04 PROCEDURE — 93880 EXTRACRANIAL BILAT STUDY: CPT

## 2019-12-04 PROCEDURE — 85610 PROTHROMBIN TIME: CPT

## 2019-12-04 PROCEDURE — 87116 MYCOBACTERIA CULTURE: CPT

## 2019-12-04 PROCEDURE — 80048 BASIC METABOLIC PNL TOTAL CA: CPT

## 2019-12-04 NOTE — CAT SCAN REPORT
Exam:  CT cervical spine



History:  neck pain;



Technique:  Contiguous thin cut axial images obtained through the cervical spine. Sagittal and corona
l reconstructions performed by the technologist. All CT scans at this location are performed using CT
 dose reduction for ALARA by means of automated exposure control.



Findings: Previous CT scan from 3/27/2017 was reviewed.



There is no evidence of fracture or traumatic subluxation.



Reversal of cervical lordosis seen centering at C4 vertebral body level





Craniovertebral junction is normal.



At C2-C3 disc level, minimal spondylolisthesis seen due to facet joint degenerative changes. Neurofor
ovi are normal.



At C3-C4 disc level, bulging disc is seen. Moderate facet joint degenerative changes are seen. Neurof
oramina are normal.



At C4-C5 disc level, degenerative retrolisthesis seen. Disc space is narrowed. Broad-based bony spur 
is seen more towards the left side. Right neuroforamen is narrowed.



At C5-C6 disc level, disc space is narrowed. Bony spur is seen towards the left side. Left neural for
amen is narrowed.



At C6-C7 disc level, degenerative retrolisthesis seen. Broad-based bony spur is seen. Both neural for
ovi are narrowed.



C7-T1 disc space is normal. Bilateral facet joint degenerative changes are seen.



CT findings remain unchanged since March 2017.



Surrounding soft tissues are grossly normal.



Impression: No change in the spondylotic changes at C4-C5, C5-C6 and C6-C7 disc levels resulting in f
oraminal stenoses; these remain unchanged since 3/27/2017



Signer Name: Kieran Clarke MD 

Signed: 12/4/2019 5:23 PM

 Workstation Name: [id number]

## 2019-12-04 NOTE — EMERGENCY DEPARTMENT REPORT
Blank Doc





- Documentation


Documentation: 





59-year-old male that presents with dizziness and headache.  Stated became sosa puentes today in [facility] and had a ground level fall but is not sure if LOC.  Has

neck pain as well.





This initial assessment/diagnostic orders/clinical plan/treatment(s) is/are 

subject to change based on patient's health status, clinical progression and re-

assessment by fellow clinical providers in the ED.  Further treatment and workup

at subsequent clinical providers discretion.  Patient/guardians urged not to 

elope from the ED as their condition may be serious if not clinically assessed 

and managed.  Initial orders include:


1- Patient sent to [facility] for further evaluation and treatment


2- EKG


3- labs


4- CT head/neck

## 2019-12-04 NOTE — EMERGENCY DEPARTMENT REPORT
ED General Adult HPI





- General


Chief complaint: Headache


Stated complaint: MIGRAINE


Time Seen by Provider: 12/04/19 15:11


Source: patient, RN notes reviewed, old records reviewed


Mode of arrival: Wheelchair


Limitations: Other (alcohol intoxication)





- History of Present Illness


Initial comments: 





The patient is a 59-year-old gentleman.  I have evaluated this patient in the 

past.





Patient has a history of ICD device, ventricular hypokinesis, ejection fraction 

25-30%, history of V. tach, recently admitted to this hospital for presumed GI 

bleed, found to have duodenitis and gastritis.





The patient presents to the ER today with complaint of nontraumatic headache, 

neck pain, malaise, weakness and fatigue.  He believes that he also passed out 

earlier on today but he is not sure.  The headache is present for 1 month.  It 

is throbbing.  It is not described as sudden or thunderclap in nature.  There is

no midline neck pain.  There is no focal extremity weakness or numbness.  He 

makes no complaint of chest pain.  He makes no complaint of intentional o

verdose.


-: Gradual, Sudden


Location: head, neck


Quality: aching


Consistency: intermittent


Improves with: none


Worsens with: none





- Related Data


                                Home Medications











 Medication  Instructions  Recorded  Confirmed  Last Taken


 


Gabapentin 600 mg PO QHS 12/22/18 12/04/19 12/22/18


 


Metoprolol Xl [Metoprolol 100 mg PO BID 12/22/18 12/04/19 12/21/18





SUCCINATE ER TAB]    








                                  Previous Rx's











 Medication  Instructions  Recorded  Last Taken  Type


 


Acetaminophen [Acetaminophen TAB] 2 tab PO Q4H PRN #15 tablet 11/07/19 Unknown 

Rx


 


Amiodarone [Cordarone 200 MG TAB] 200 mg PO BID #60 tablet 11/07/19 Unknown Rx


 


HYDROcodone/APAP 5-325 [Norco 1 each PO Q4H PRN #20 tablet 11/07/19 Unknown Rx





5-325 mg TAB]    


 


Isosorb Dinit/Hydralazine [Bidil 1 each PO Q12HR  tablet 11/07/19 Unknown Rx





20/37.5MG]    


 


Lisinopril [Zestril TAB] 20 mg PO QDAY  tablet 11/07/19 Unknown Rx


 


Magnesium Oxide [Mag-Ox] 400 mg PO TIDAC #60 tablet 11/07/19 Unknown Rx


 


NIFEdipine [Nifedipine ER] 60 mg PO DAILY #30 tab 11/07/19 12/21/18 Rx


 


Pantoprazole [Protonix TAB] 40 mg PO BID #60 tablet 11/07/19 Unknown Rx


 


Spironolactone [Aldactone] 25 mg PO QDAY #30 tablet 11/07/19 Unknown Rx


 


Thiamine [Vitamin B-1] 100 mg PO QDAY #30 tab 11/07/19 Unknown Rx


 


traZODone [Desyrel] 50 mg PO QHS #30 tab 11/07/19 12/21/18 Rx











                                    Allergies











Allergy/AdvReac Type Severity Reaction Status Date / Time


 


No Known Allergies Allergy   Verified 03/27/17 02:41














ED Review of Systems


ROS: 


Stated complaint: MIGRAINE


Other details as noted in HPI





Constitutional: malaise, weakness


Eyes: denies: eye discharge


ENT: denies: congestion


Respiratory: denies: wheezing


Cardiovascular: syncope


Gastrointestinal: denies: vomiting


Genitourinary: denies: dysuria


Musculoskeletal: arthralgia, myalgia


Skin: denies: lesions


Neurological: headache, weakness


Hematological/Lymphatic: denies: easy bleeding





ED Past Medical Hx





- Past Medical History


Previous Medical History?: Yes


Hx Hypertension: Yes (nonischemic cardiomyopathy, EF 15-20%)


Hx Congestive Heart Failure: No


Hx Diabetes: No


Hx Renal Disease: No


Hx Arthritis: Yes


Hx Seizures: Yes (last months ago per patient, not on meds)


Hx Asthma: Yes


Hx COPD: No


Additional medical history: VTACH, Defibrillator, Head Trauma, Concussions





- Surgical History


Past Surgical History?: Yes


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


Additional Surgical History: JAW, KNEE, HAND





- Social History


Smoking Status: Unknown if ever smoked


Substance Use Type: None





- Medications


Home Medications: 


                                Home Medications











 Medication  Instructions  Recorded  Confirmed  Last Taken  Type


 


Gabapentin 600 mg PO QHS 12/22/18 12/04/19 12/22/18 History


 


Metoprolol Xl [Metoprolol 100 mg PO BID 12/22/18 12/04/19 12/21/18 History





SUCCINATE ER TAB]     


 


Acetaminophen [Acetaminophen TAB] 2 tab PO Q4H PRN #15 tablet 11/07/19 12/04/19 

Unknown Rx


 


Amiodarone [Cordarone 200 MG TAB] 200 mg PO BID #60 tablet 11/07/19 12/04/19 

Unknown Rx


 


HYDROcodone/APAP 5-325 [Norco 1 each PO Q4H PRN #20 tablet 11/07/19 12/04/19 

Unknown Rx





5-325 mg TAB]     


 


Isosorb Dinit/Hydralazine [Bidil 1 each PO Q12HR  tablet 11/07/19 12/04/19 

Unknown Rx





20/37.5MG]     


 


Lisinopril [Zestril TAB] 20 mg PO QDAY  tablet 11/07/19 12/04/19 Unknown Rx


 


Magnesium Oxide [Mag-Ox] 400 mg PO TIDAC #60 tablet 11/07/19 12/04/19 Unknown Rx


 


NIFEdipine [Nifedipine ER] 60 mg PO DAILY #30 tab 11/07/19 12/04/19 12/21/18 Rx


 


Pantoprazole [Protonix TAB] 40 mg PO BID #60 tablet 11/07/19 12/04/19 Unknown Rx


 


Spironolactone [Aldactone] 25 mg PO QDAY #30 tablet 11/07/19 12/04/19 Unknown Rx


 


Thiamine [Vitamin B-1] 100 mg PO QDAY #30 tab 11/07/19 12/04/19 Unknown Rx


 


traZODone [Desyrel] 50 mg PO QHS #30 tab 11/07/19 12/04/19 12/21/18 Rx














ED Physical Exam





- General


Limitations: Other (patient is intoxicated)


General appearance: alert, appears intoxicated





- Head


Head exam: Present: atraumatic, normocephalic





- Eye


Eye exam: Present: normal appearance, EOMI.  Absent: nystagmus





- ENT


ENT exam: Present: normal exam, normal orophraynx, mucous membranes moist, 

normal external ear exam





- Neck


Neck exam: Present: normal inspection, full ROM.  Absent: tenderness, 

meningismus





- Respiratory


Respiratory exam: Present: normal lung sounds bilaterally.  Absent: respiratory 

distress





- Cardiovascular


Cardiovascular Exam: Present: regular rate, normal rhythm, normal heart sounds. 

 Absent: bradycardia, tachycardia, irregular rhythm, systolic murmur, diastolic 

murmur, rubs, gallop





- GI/Abdominal


GI/Abdominal exam: Present: soft.  Absent: distended, tenderness, guarding, 

rebound, rigid, pulsatile mass





- Rectal


Rectal exam: Present: deferred





- Extremities Exam


Extremities exam: Present: normal inspection, full ROM, other (2+ pulses noted 

in the bilateral upper and lower extremities.  There is no palpable cord.   

negative Homans sign.  Muscular compartments are soft.  The pelvis is stable.). 

 Absent: pedal edema, joint swelling, calf tenderness





- Back Exam


Back exam: Present: normal inspection, full ROM.  Absent: tenderness, CVA 

tenderness (R), CVA tenderness (L), paraspinal tenderness, vertebral tenderness





- Neurological Exam


Neurological exam: Present: alert, other (there is no facial droop.  The tongue 

is midline.  The extraocular movements are intact bilaterally.  There is 5 out 

of 5 strength in 4 extremities.  Sensation is intact to light touch in 4 ext

remities.)





- Psychiatric


Psychiatric exam: Present: anxious





- Skin


Skin exam: Present: warm, dry, intact, normal color.  Absent: rash





ED Course


                                   Vital Signs











  12/04/19 12/04/19 12/04/19





  15:11 20:46 21:16


 


Temperature 98.2 F  


 


Pulse Rate 68 77 78


 


Respiratory 18 24 17





Rate   


 


Blood Pressure 102/65 102/61 100/49


 


O2 Sat by Pulse 98 99 97





Oximetry   














  12/04/19





  21:22


 


Temperature 


 


Pulse Rate 


 


Respiratory 16





Rate 


 


Blood Pressure 


 


O2 Sat by Pulse 





Oximetry 














- Reevaluation(s)


Reevaluation #1: 





12/04/19 23:37


Creatinine kinase elevated at 6700.  Fluids were ordered, differential diagnosis

 now includes rhabdomyolysis.





ED Medical Decision Making





- Lab Data


Result diagrams: 


                                 12/04/19 15:43





                                 12/04/19 15:43








                                   Vital Signs











  12/04/19 12/04/19 12/04/19





  15:11 20:46 21:16


 


Temperature 98.2 F  


 


Pulse Rate 68 77 78


 


Respiratory 18 24 17





Rate   


 


Blood Pressure 102/65 102/61 100/49


 


O2 Sat by Pulse 98 99 97





Oximetry   














  12/04/19





  21:22


 


Temperature 


 


Pulse Rate 


 


Respiratory 16





Rate 


 


Blood Pressure 


 


O2 Sat by Pulse 





Oximetry 











                                   Lab Results











  12/04/19 12/04/19 12/04/19 Range/Units





  15:43 15:43 15:43 


 


WBC  5.6    (4.5-11.0)  K/mm3


 


RBC  3.81    (3.65-5.03)  M/mm3


 


Hgb  11.0 L    (11.8-15.2)  gm/dl


 


Hct  34.0 L    (35.5-45.6)  %


 


MCV  89    (84-94)  fl


 


MCH  29    (28-32)  pg


 


MCHC  33    (32-34)  %


 


RDW  17.7 H    (13.2-15.2)  %


 


Plt Count  230    (140-440)  K/mm3


 


Lymph % (Auto)  14.4    (13.4-35.0)  %


 


Mono % (Auto)  8.4 H    (0.0-7.3)  %


 


Eos % (Auto)  0.3    (0.0-4.3)  %


 


Baso % (Auto)  0.7    (0.0-1.8)  %


 


Lymph #  0.8 L    (1.2-5.4)  K/mm3


 


Mono #  0.5    (0.0-0.8)  K/mm3


 


Eos #  0.0    (0.0-0.4)  K/mm3


 


Baso #  0.0    (0.0-0.1)  K/mm3


 


Seg Neutrophils %  76.2 H    (40.0-70.0)  %


 


Seg Neutrophils #  4.3    (1.8-7.7)  K/mm3


 


PT    13.6  (12.2-14.9)  Sec.


 


INR    1.05  (0.87-1.13)  


 


APTT    34.8  (24.2-36.6)  Sec.


 


Sodium   137   (137-145)  mmol/L


 


Potassium   4.3   (3.6-5.0)  mmol/L


 


Chloride   100.8   ()  mmol/L


 


Carbon Dioxide   17 L   (22-30)  mmol/L


 


Anion Gap   24   mmol/L


 


BUN   26 H   (9-20)  mg/dL


 


Creatinine   3.6 H   (0.8-1.5)  mg/dL


 


Estimated GFR   21   ml/min


 


BUN/Creatinine Ratio   7   %


 


Glucose   88   ()  mg/dL


 


Calcium   9.5   (8.4-10.2)  mg/dL


 


Total Bilirubin   0.40   (0.1-1.2)  mg/dL


 


AST   157 H   (5-40)  units/L


 


ALT   45   (7-56)  units/L


 


Alkaline Phosphatase   52   ()  units/L


 


Troponin T   0.030 H   (0.00-0.029)  ng/mL


 


Total Protein   7.3   (6.3-8.2)  g/dL


 


Albumin   4.3   (3.9-5)  g/dL


 


Albumin/Globulin Ratio   1.4   %


 


Triglycerides   151 H   (2-149)  mg/dL


 


Cholesterol   167   ()  mg/dL


 


LDL Cholesterol Direct   42 L   ()  mg/dL


 


HDL Cholesterol   111 H   (40-59)  mg/dL


 


Cholesterol/HDL Ratio   1.50   %


 


Plasma/Serum Alcohol     (0-0.07)  %














  12/04/19 12/04/19 Range/Units





  15:43 19:43 


 


WBC    (4.5-11.0)  K/mm3


 


RBC    (3.65-5.03)  M/mm3


 


Hgb    (11.8-15.2)  gm/dl


 


Hct    (35.5-45.6)  %


 


MCV    (84-94)  fl


 


MCH    (28-32)  pg


 


MCHC    (32-34)  %


 


RDW    (13.2-15.2)  %


 


Plt Count    (140-440)  K/mm3


 


Lymph % (Auto)    (13.4-35.0)  %


 


Mono % (Auto)    (0.0-7.3)  %


 


Eos % (Auto)    (0.0-4.3)  %


 


Baso % (Auto)    (0.0-1.8)  %


 


Lymph #    (1.2-5.4)  K/mm3


 


Mono #    (0.0-0.8)  K/mm3


 


Eos #    (0.0-0.4)  K/mm3


 


Baso #    (0.0-0.1)  K/mm3


 


Seg Neutrophils %    (40.0-70.0)  %


 


Seg Neutrophils #    (1.8-7.7)  K/mm3


 


PT    (12.2-14.9)  Sec.


 


INR    (0.87-1.13)  


 


APTT    (24.2-36.6)  Sec.


 


Sodium    (137-145)  mmol/L


 


Potassium    (3.6-5.0)  mmol/L


 


Chloride    ()  mmol/L


 


Carbon Dioxide    (22-30)  mmol/L


 


Anion Gap    mmol/L


 


BUN    (9-20)  mg/dL


 


Creatinine    (0.8-1.5)  mg/dL


 


Estimated GFR    ml/min


 


BUN/Creatinine Ratio    %


 


Glucose    ()  mg/dL


 


Calcium    (8.4-10.2)  mg/dL


 


Total Bilirubin    (0.1-1.2)  mg/dL


 


AST    (5-40)  units/L


 


ALT    (7-56)  units/L


 


Alkaline Phosphatase    ()  units/L


 


Troponin T   0.029  (0.00-0.029)  ng/mL


 


Total Protein    (6.3-8.2)  g/dL


 


Albumin    (3.9-5)  g/dL


 


Albumin/Globulin Ratio    %


 


Triglycerides    (2-149)  mg/dL


 


Cholesterol    ()  mg/dL


 


LDL Cholesterol Direct    ()  mg/dL


 


HDL Cholesterol    (40-59)  mg/dL


 


Cholesterol/HDL Ratio    %


 


Plasma/Serum Alcohol  0.11 H   (0-0.07)  %














- EKG Data


-: EKG Interpreted by Me


EKG shows normal: sinus rhythm


Rate: normal





- EKG Data





12/04/19 22:33


EKG shows motion artifact.  This is sinus rhythm, left axis deviation, poor R 

progression, the QTC is prolonged, there is a left anterior fascicular block, 

the EKG is abnormal, it is not consistent with a stemi





- Radiology Data


Radiology results: report reviewed, image reviewed





Noncontrast CT scan of the brain and cervical spine negative for acute disease





- Medical Decision Making





Differential diagnosis, including but not limited to: Migraine headache, tension

 headache, cluster headache, traumatic headache, alcohol intoxication, 

orthostasis, vagal event, structural cardiac disease, dehydration, electrolyte 

derangement





Assessment and plan:








Assessment and plan: 59-year-old gentleman, with extensive cardiac history, 

presumed nonischemic cardiomyopathy, ICD in situ, who presents alcohol 

intoxicated, with blunt trauma, nonfocal exam, GCS of 15, CT scan brain and 

cervical spine negative for acute disease, found to have acute renal 

insufficiency, and elevated troponin, likely secondary to renal insufficiency, 

dehydration, this is likely a type II troponin leak.  We will treat him with IV 

fluids and headache medicine.  We have recommended admission to the medical 

service for supportive care.  Discussed this with patient who verbalized 

understanding and was amenable to this plan of care.


Contacted our hospital physician, Dr. Colunga, who will accept patient to the 

medical service for further evaluation and management for the aforementioned 

findings.





Critical care attestation.: 


If time is entered above; I have spent that time in minutes in the direct care 

of this critically ill patient, excluding procedure time.








ED Disposition


Clinical Impression: 


 Syncope, HERMELINDA (acute kidney injury), Alcohol intoxication, Rhabdomyolysis





Disposition: DC-09 OP ADMIT IP TO THIS HOSP


Is pt being admited?: Yes


Does the pt Need Aspirin: Yes


Condition: Stable

## 2019-12-04 NOTE — HISTORY AND PHYSICAL REPORT
History of Present Illness


Date of examination: 12/04/19


Date of admission: 


12/04/19 22:38





History of present illness: 


59-year-old man with a history of hypertension, CHF EF of 25%, A. fib, paro

xysmal V. tach comes emergency room for evaluation of syncope.  Patient states 

that he went to [facility] today, he felt dizzy, took a few steps and then passed 

out, it is unclear how long.  He states that over the last 1-1/2 months he has 

been having headaches, global but mostly occipital, constant, intensity 5 out of

10,  radiating to the neck, cannot identify exacerbating factors.


Review Of  Systems:


Constitutional: no weight loss, fever, chills


Ears, eyes, nose, mouth and throat: no nasal congestion, no nasal discharge, no 

sinus pressure, blurry vision, diplopia


Neck: No neck pain or rigidity.


Cardiovascular: No  palpitations, chest pain


Respiratory: No shortness of breath, cough


Gastrointestinal: No hematochezia, abdominal pain


Genitourinary : no dysuria, frequency


Musculoskeletal: no muscle ache , joint pain


Integumentary: no rash, no pruritis


Neurological: no parathesias, focal weakness


Endocrine: no cold or heat intolerance, no polyuria or polydipsia


Hematologic/Lymphatic: no easy bruising, no easy bleeding, no gland swelling


Allergic/Immunologic: no urticaria, no angioedema.





PAST MEDICAL HISTORY: hypertension, CHF EF of 25%, A. fib, paroxysmal V. tach 





PAST SURGICAL HISTORY: Bilateral knee surgery, carpal tunnel release, jaw, AICD 





SOCIAL HISTORY: Denies alcohol, tobacco,  drugs





FAMILY HISTORY: Hypertension














Medications and Allergies


                                    Allergies











Allergy/AdvReac Type Severity Reaction Status Date / Time


 


No Known Allergies Allergy   Verified 03/27/17 02:41











                                Home Medications











 Medication  Instructions  Recorded  Confirmed  Last Taken  Type


 


Gabapentin 600 mg PO QHS 12/22/18 12/04/19 12/22/18 History


 


Metoprolol Xl [Metoprolol 100 mg PO BID 12/22/18 12/04/19 12/21/18 History





SUCCINATE ER TAB]     


 


Acetaminophen [Acetaminophen TAB] 2 tab PO Q4H PRN #15 tablet 11/07/19 12/04/19 

Unknown Rx


 


Amiodarone [Cordarone 200 MG TAB] 200 mg PO BID #60 tablet 11/07/19 12/04/19 

Unknown Rx


 


HYDROcodone/APAP 5-325 [Norco 1 each PO Q4H PRN #20 tablet 11/07/19 12/04/19 

Unknown Rx





5-325 mg TAB]     


 


Isosorb Dinit/Hydralazine [Bidil 1 each PO Q12HR  tablet 11/07/19 12/04/19 

Unknown Rx





20/37.5MG]     


 


Lisinopril [Zestril TAB] 20 mg PO QDAY  tablet 11/07/19 12/04/19 Unknown Rx


 


Magnesium Oxide [Mag-Ox] 400 mg PO TIDAC #60 tablet 11/07/19 12/04/19 Unknown Rx


 


NIFEdipine [Nifedipine ER] 60 mg PO DAILY #30 tab 11/07/19 12/04/19 12/21/18 Rx


 


Pantoprazole [Protonix TAB] 40 mg PO BID #60 tablet 11/07/19 12/04/19 Unknown Rx


 


Spironolactone [Aldactone] 25 mg PO QDAY #30 tablet 11/07/19 12/04/19 Unknown Rx


 


Thiamine [Vitamin B-1] 100 mg PO QDAY #30 tab 11/07/19 12/04/19 Unknown Rx


 


traZODone [Desyrel] 50 mg PO QHS #30 tab 11/07/19 12/04/19 12/21/18 Rx











Active Meds: 


Active Medications





Acetaminophen (Tylenol)  650 mg PO Q4H PRN


   PRN Reason: Pain MILD(1-3)/Fever >100.5/HA


Enoxaparin Sodium (Enoxaparin)  30 mg SUB-Q QDAY JORDANA


Ondansetron HCl (Zofran)  4 mg IV Q8H PRN


   PRN Reason: Nausea And Vomiting


Sodium Chloride (Sodium Chloride Flush Syringe 10 Ml)  10 ml IV BID JORDANA


Sodium Chloride (Sodium Chloride Flush Syringe 10 Ml)  10 ml IV PRN PRN


   PRN Reason: LINE FLUSH











Exam





- Physical Exam


Narrative exam: 


Gen. appearance: Patient lying in bed, no apparent distress


HEENT: Normocephalic, atraumatic, pupils equally round and reactive to light, 

extraocular movement intact, and no sclericterus,. No JVD or thyromegaly or 

nodule,neck supple, no carotid bruit ,mucous membranes moist, no exudate or 

erythema


Heart: S1, S2, regular rate and rhythm


Lungs: Crackles bilaterally, breathing comfortable


Abdomen: Positive bowel sounds, nontender, nondistended, no organomegaly


Extremity: no edema, cyanosis, clubbing


Skin: No rash, nodules, warm, dry


Neuro: speech is fluent, moves extremities, sensory intact








- Constitutional


Vitals: 


                                        











Temp Pulse Resp BP Pulse Ox


 


 98.2 F   78   16   100/49   97 


 


 12/04/19 15:11  12/04/19 21:16  12/04/19 21:22  12/04/19 21:16  12/04/19 21:16














Results





- Labs


CBC & Chem 7: 


                                 12/04/19 15:43





                                 12/04/19 15:43


Labs: 


                              Abnormal lab results











  12/04/19 12/04/19 12/04/19 Range/Units





  15:43 15:43 15:43 


 


Hgb  11.0 L    (11.8-15.2)  gm/dl


 


Hct  34.0 L    (35.5-45.6)  %


 


RDW  17.7 H    (13.2-15.2)  %


 


Mono % (Auto)  8.4 H    (0.0-7.3)  %


 


Lymph #  0.8 L    (1.2-5.4)  K/mm3


 


Seg Neutrophils %  76.2 H    (40.0-70.0)  %


 


Carbon Dioxide   17 L   (22-30)  mmol/L


 


BUN   26 H   (9-20)  mg/dL


 


Creatinine   3.6 H   (0.8-1.5)  mg/dL


 


AST   157 H   (5-40)  units/L


 


Total Creatine Kinase     ()  units/L


 


Troponin T   0.030 H   (0.00-0.029)  ng/mL


 


Triglycerides   151 H   (2-149)  mg/dL


 


LDL Cholesterol Direct   42 L   ()  mg/dL


 


HDL Cholesterol   111 H   (40-59)  mg/dL


 


Salicylates     (2.8-20.0)  mg/dL


 


Acetaminophen     (10.0-30.0)  ug/mL


 


Plasma/Serum Alcohol    0.11 H  (0-0.07)  %














  12/04/19 12/04/19 12/04/19 Range/Units





  21:05 21:05 21:05 


 


Hgb     (11.8-15.2)  gm/dl


 


Hct     (35.5-45.6)  %


 


RDW     (13.2-15.2)  %


 


Mono % (Auto)     (0.0-7.3)  %


 


Lymph #     (1.2-5.4)  K/mm3


 


Seg Neutrophils %     (40.0-70.0)  %


 


Carbon Dioxide     (22-30)  mmol/L


 


BUN     (9-20)  mg/dL


 


Creatinine     (0.8-1.5)  mg/dL


 


AST     (5-40)  units/L


 


Total Creatine Kinase  6776 H    ()  units/L


 


Troponin T     (0.00-0.029)  ng/mL


 


Triglycerides     (2-149)  mg/dL


 


LDL Cholesterol Direct     ()  mg/dL


 


HDL Cholesterol     (40-59)  mg/dL


 


Salicylates   < 0.3 L   (2.8-20.0)  mg/dL


 


Acetaminophen    < 5.0 L  (10.0-30.0)  ug/mL


 


Plasma/Serum Alcohol     (0-0.07)  %














- Imaging and Cardiology


CT Scan - head: report reviewed





Assessment and Plan


CT neck reviewed





Assessment


Acute renal failure


Start IV fluid, obtain ultrasound of the kidneys 


Consult renal, monitor kidney function


hold diuretics, ACEI





Syncope


Check cardiac enzymes, echo, carotid Doppler orthostatics


Consult cardiology





Persistent Headache


Start pain medication, consult neurology, workup negative





Rhabdomyolysis, acute


Start gentle fluids, monitor CK





CHF chronic systolic, EF 25%


CHF stable, hold diuretics for now





Paroxysmal A. fib/VTac


Eliquis was discontinued on the last d/c in november due to GIB


Continue to monitor





History of hypertension,now normotensive


continue to monitor

## 2019-12-05 LAB
BASOPHILS # (AUTO): 0 K/MM3 (ref 0–0.1)
BASOPHILS NFR BLD AUTO: 0.8 % (ref 0–1.8)
BENZODIAZEPINES SCREEN,URINE: (no result)
BUN SERPL-MCNC: 27 MG/DL (ref 9–20)
BUN/CREAT SERPL: 9 %
CALCIUM SERPL-MCNC: 8.8 MG/DL (ref 8.4–10.2)
EOSINOPHIL # BLD AUTO: 0 K/MM3 (ref 0–0.4)
EOSINOPHIL NFR BLD AUTO: 0.7 % (ref 0–4.3)
HCT VFR BLD CALC: 33.4 % (ref 35.5–45.6)
HEMOLYSIS INDEX: 18
HGB BLD-MCNC: 10.9 GM/DL (ref 11.8–15.2)
LYMPHOCYTES # BLD AUTO: 0.8 K/MM3 (ref 1.2–5.4)
LYMPHOCYTES NFR BLD AUTO: 17.2 % (ref 13.4–35)
MCHC RBC AUTO-ENTMCNC: 33 % (ref 32–34)
MCV RBC AUTO: 89 FL (ref 84–94)
METHADONE SCREEN,URINE: (no result)
MONOCYTES # (AUTO): 0.6 K/MM3 (ref 0–0.8)
MONOCYTES % (AUTO): 11.5 % (ref 0–7.3)
OPIATE SCREEN,URINE: (no result)
PLATELET # BLD: 208 K/MM3 (ref 140–440)
RBC # BLD AUTO: 3.78 M/MM3 (ref 3.65–5.03)

## 2019-12-05 PROCEDURE — 4B02XTZ MEASUREMENT OF CARDIAC DEFIBRILLATOR, EXTERNAL APPROACH: ICD-10-PCS | Performed by: INTERNAL MEDICINE

## 2019-12-05 RX ADMIN — GABAPENTIN SCH MG: 100 CAPSULE ORAL at 21:22

## 2019-12-05 RX ADMIN — PANTOPRAZOLE SODIUM SCH MG: 40 TABLET, DELAYED RELEASE ORAL at 11:20

## 2019-12-05 RX ADMIN — Medication SCH MG: at 11:20

## 2019-12-05 RX ADMIN — Medication SCH ML: at 11:26

## 2019-12-05 RX ADMIN — Medication SCH MG: at 17:12

## 2019-12-05 RX ADMIN — Medication SCH ML: at 21:24

## 2019-12-05 RX ADMIN — LIDOCAINE SCH EACH: 50 PATCH TOPICAL at 13:46

## 2019-12-05 RX ADMIN — AMIODARONE HYDROCHLORIDE SCH MG: 200 TABLET ORAL at 11:25

## 2019-12-05 RX ADMIN — AMIODARONE HYDROCHLORIDE SCH MG: 200 TABLET ORAL at 21:23

## 2019-12-05 RX ADMIN — Medication SCH: at 11:33

## 2019-12-05 RX ADMIN — SODIUM CHLORIDE SCH MLS/HR: 0.45 INJECTION, SOLUTION INTRAVENOUS at 05:30

## 2019-12-05 RX ADMIN — PANTOPRAZOLE SODIUM SCH MG: 40 TABLET, DELAYED RELEASE ORAL at 21:23

## 2019-12-05 RX ADMIN — GABAPENTIN SCH MG: 100 CAPSULE ORAL at 13:23

## 2019-12-05 RX ADMIN — ENOXAPARIN SODIUM SCH MG: 100 INJECTION SUBCUTANEOUS at 11:20

## 2019-12-05 RX ADMIN — METOPROLOL SUCCINATE SCH MG: 50 TABLET, EXTENDED RELEASE ORAL at 13:51

## 2019-12-05 RX ADMIN — SODIUM CHLORIDE SCH MLS/HR: 0.45 INJECTION, SOLUTION INTRAVENOUS at 21:24

## 2019-12-05 RX ADMIN — BUTALBITAL, ACETAMINOPHEN, AND CAFFEINE PRN TAB: 50; 325; 40 TABLET ORAL at 21:27

## 2019-12-05 NOTE — VASCULAR LAB REPORT
Bilateral carotid Doppler. 12/5/2019.



HISTORY: Syncope.



FINDINGS: Duplex Doppler evaluation of the carotid system was performed with spectral waveform analys
is. There is antegrade vertebral flow bilaterally.



Peak systolic velocity at the right internal carotid artery is 78 cm/s. Systolic velocity ratio is 0.
9.



Peak systolic velocity at the left internal carotid artery is 89 cm/s. Systolic velocity ratio is 1.2
.



Ratio imaging demonstrates no flow limiting stenosis.



IMPRESSION: Stenosis less than 50% bilaterally per NASCET criteria.



Signer Name: Alec Alcaraz MD 

Signed: 12/5/2019 9:32 AM

 Workstation Name: [id number]

## 2019-12-05 NOTE — ULTRASOUND REPORT
ULTRASOUND RENAL



INDICATION:

arf.



COMPARISON:

CT scan dated 12/22/2018. 



FINDINGS:

RIGHT KIDNEY: Size: 11.3 cm. 

Echogenicity: Mildly increased. Cortical thickness: Normal.

Stones: None. 

Hydronephrosis: None. 

Cyst or mass: None.  



LEFT KIDNEY: Size: 11.6 cm. 

Echogenicity: Mildly increased. Cortical thickness: Normal. 

Stones: A few small echogenic foci are noted in the calyces of the left kidney. May represent nonobst
ructing stones.. 

Hydronephrosis: None. 

Cyst or mass: None.  



Urinary Bladder: No significant abnormality.

Free Fluid: None.

Additional Findings: None.



IMPRESSION

1. Possible left nephrolithiasis. There is no hydronephrosis.. 

2. The kidneys are mildly echogenic characteristic of medical renal disease.



Signer Name: Darryl Villagran MD 

Signed: 12/5/2019 4:59 PM

 Workstation Name: [id number]

## 2019-12-05 NOTE — CONSULTATION
History of Present Illness


Consult date: 12/05/19


Consult reason: syncope


History of present illness: 





This is a 59-year old male who presented with headaches and syncope. Patient 

admits to taking excessive amount of Advil over the last few days. Initial labs 

shows acute kidney injury with a creatinine at 3.6. Cardiac enzymes measured 

shows a CK 5733 with a normal relative index of 1.9 consistent with 

rhadomyolysis. There is also poly-substance abuse. Head CT scan reports no acute

intracranial abnormalities. 





Patient has history of dilated nonischemic cardiomyopathy, EF 25%, paroxysmal at

rial fibrillation and paroxysmal ventricular tachycardia. He has a single 

chamber Biotronik ICD insitu. His oral anticoagulation was placed on a temporary

hold, for upper GI bleeding 3 weeks ago. He is on amiodarone and beta blockers 

for suppression of arrhythmias. 





Patient denies chest pain, unusual shortness of breath and palpitations. Patient

denies AICD discharge. An ECG is sinus rhythm, no acute ischemic changes.





Medications and Allergies


                                    Allergies











Allergy/AdvReac Type Severity Reaction Status Date / Time


 


No Known Allergies Allergy   Verified 03/27/17 02:41











                                Home Medications











 Medication  Instructions  Recorded  Confirmed  Last Taken  Type


 


Gabapentin 600 mg PO QHS 12/22/18 12/04/19 12/22/18 History


 


Metoprolol Xl [Metoprolol 100 mg PO BID 12/22/18 12/04/19 12/21/18 History





SUCCINATE ER TAB]     


 


Acetaminophen [Acetaminophen TAB] 2 tab PO Q4H PRN #15 tablet 11/07/19 12/04/19 

Unknown Rx


 


Amiodarone [Cordarone 200 MG TAB] 200 mg PO BID #60 tablet 11/07/19 12/04/19 

Unknown Rx


 


HYDROcodone/APAP 5-325 [Norco 1 each PO Q4H PRN #20 tablet 11/07/19 12/04/19 

Unknown Rx





5-325 mg TAB]     


 


Isosorb Dinit/Hydralazine [Bidil 1 each PO Q12HR  tablet 11/07/19 12/04/19 

Unknown Rx





20/37.5MG]     


 


Lisinopril [Zestril TAB] 20 mg PO QDAY  tablet 11/07/19 12/04/19 Unknown Rx


 


Magnesium Oxide [Mag-Ox] 400 mg PO TIDAC #60 tablet 11/07/19 12/04/19 Unknown Rx


 


NIFEdipine [Nifedipine ER] 60 mg PO DAILY #30 tab 11/07/19 12/04/19 12/21/18 Rx


 


Pantoprazole [Protonix TAB] 40 mg PO BID #60 tablet 11/07/19 12/04/19 Unknown Rx


 


Spironolactone [Aldactone] 25 mg PO QDAY #30 tablet 11/07/19 12/04/19 Unknown Rx


 


Thiamine [Vitamin B-1] 100 mg PO QDAY #30 tab 11/07/19 12/04/19 Unknown Rx


 


traZODone [Desyrel] 50 mg PO QHS #30 tab 11/07/19 12/04/19 12/21/18 Rx











Active Meds: 


Active Medications





Acetaminophen (Tylenol)  650 mg PO Q4H PRN


   PRN Reason: Pain MILD(1-3)/Fever >100.5/HA


Amiodarone HCl (Cordarone)  200 mg PO BID [facility]


Enoxaparin Sodium (Enoxaparin)  30 mg SUB-Q QDAY [facility]


Gabapentin (Gabapentin)  600 mg PO QHS [facility]


Sodium Chloride (Nacl 0.45% 1000 Ml)  1,000 mls @ 75 mls/hr IV AS DIRECT [facility]


   Last Admin: 12/05/19 05:30 Dose:  50 mls/hr


   Documented by: 


Magnesium Oxide (Mag-Ox)  400 mg PO TIDAC [facility]


Ondansetron HCl (Zofran)  4 mg IV Q8H PRN


   PRN Reason: Nausea And Vomiting


Pantoprazole Sodium (Protonix)  40 mg PO BID [facility]


Sodium Chloride (Sodium Chloride Flush Syringe 10 Ml)  10 ml IV BID [facility]


Sodium Chloride (Sodium Chloride Flush Syringe 10 Ml)  10 ml IV PRN PRN


   PRN Reason: LINE FLUSH


Thiamine HCl (Vitamin B-1)  100 mg PO QDAY [facility]











Physical Examination


                                   Vital Signs











Temp Pulse Resp BP Pulse Ox


 


 98.2 F   68   18   102/65   98 


 


 12/04/19 15:11  12/04/19 15:11  12/04/19 15:11  12/04/19 15:11  12/04/19 15:11











General appearance: no acute distress


HEENT: Positive: PERRL


Neck: Positive: trachea midline


Cardiac: Positive: Reg Rate and Rhythm


Lungs: Positive: Decreased Breath Sounds


Neuro: Positive: Grossly Intact





Results





                                 12/05/19 04:18





                                 12/05/19 04:18


                                 Cardiac Enzymes











  12/04/19 12/05/19 12/05/19 Range/Units





  15:43 00:20 04:18 


 


AST  157 H    (5-40)  units/L


 


CK-MB (CK-2)   113.4 H  117.9 H  (0.0-4.0)  ng/mL








                                   Coagulation











  12/04/19 Range/Units





  15:43 


 


PT  13.6  (12.2-14.9)  Sec.


 


INR  1.05  (0.87-1.13)  


 


APTT  34.8  (24.2-36.6)  Sec.








                                     Lipids











  12/04/19 Range/Units





  15:43 


 


Triglycerides  151 H  (2-149)  mg/dL


 


Cholesterol  167  ()  mg/dL


 


HDL Cholesterol  111 H  (40-59)  mg/dL


 


Cholesterol/HDL Ratio  1.50  %








                                       CBC











  12/04/19 12/05/19 Range/Units





  15:43 04:18 


 


WBC  5.6  4.9  (4.5-11.0)  K/mm3


 


RBC  3.81  3.78  (3.65-5.03)  M/mm3


 


Hgb  11.0 L  10.9 L  (11.8-15.2)  gm/dl


 


Hct  34.0 L  33.4 L  (35.5-45.6)  %


 


Plt Count  230  208  (140-440)  K/mm3


 


Lymph #  0.8 L  0.8 L  (1.2-5.4)  K/mm3


 


Mono #  0.5  0.6  (0.0-0.8)  K/mm3


 


Eos #  0.0  0.0  (0.0-0.4)  K/mm3


 


Baso #  0.0  0.0  (0.0-0.1)  K/mm3








                          Comprehensive Metabolic Panel











  12/04/19 12/05/19 Range/Units





  15:43 04:18 


 


Sodium  137  137  (137-145)  mmol/L


 


Potassium  4.3  4.5  (3.6-5.0)  mmol/L


 


Chloride  100.8  101.3  ()  mmol/L


 


Carbon Dioxide  17 L  19 L  (22-30)  mmol/L


 


BUN  26 H  27 H  (9-20)  mg/dL


 


Creatinine  3.6 H  2.9 H  (0.8-1.5)  mg/dL


 


Glucose  88  58 L  ()  mg/dL


 


Calcium  9.5  8.8  (8.4-10.2)  mg/dL


 


AST  157 H   (5-40)  units/L


 


ALT  45   (7-56)  units/L


 


Alkaline Phosphatase  52   ()  units/L


 


Total Protein  7.3   (6.3-8.2)  g/dL


 


Albumin  4.3   (3.9-5)  g/dL














Assessment and Plan





Syncope


Headaches


Rhabdomyolysis


Acute kidney injury


Dehydration


Paroxysmal Afib


   currently in sinus rhythm; on amiodarone and metoprolol for suppression as an

 outpatient


   Eliquis previously held due to upper GI bleed


Hx of Ventricular tachycardia


Hx of Nonischemic cardiomyopathy, EF 25%


Presence of Biotronik AICD


Hypertension





Recommendations:


Echocardiogram for LVEF reassessment.


We will get a device interrogation in the setting of syncope.


Continue medical therapy for nonischemic cardiomyopathy and paroxysmal atrial 

fibrillation as tolerated.

## 2019-12-05 NOTE — CONSULTATION
History of Present Illness


Consult date: 12/05/19


Reason for Consult: headache and near syncope yesterday


History of present illness: 





This is 59 ys old male presented to hospital after he passed out in [place] 

yesterday according to pt. he did not eat for over X 4 days due to nausea and he

vomited in Tuesday he went to [employer] in a taxi when he stepped out he felt 

dizzy and sweaty tried to hold to the wall but end up on the floor he was out 

for 1-2 minutes at most he denied being confused denied tongue bitting or 

urinary incontinence.


  he is complain of headache for over 4-6 weeks pain mostly in back of head 

radiate to top severity 12/10 !!! he is also with lumber pain followed in pain 

clinic taking hydrocodon  2-3 tablets daily recently with increase neck pain he 

is taking excessive amount of Advil average 12 tablets daily he was discharged 

recently from hospital after he was diagnosed with gastritis and his AC/ Eliquis

was put on hold in November ,


According to him he cut down on drinking his last drink was in thanks giving , 

he does not smoke 


he is with long standing hx of dilated cardiomyopathy non ischemic and 

paroxysmal AF and VT he is with ICD 


In ER he was staerted on IV fluid , Ct brain is unremarkable 


Initial Bun/Cr 26/3.6 and today 27/2.9 


CPK 6776 today is down to 5733 


LDL#42


troponin #0.030





UDS is remarkable for alcohol 0.11 


carotid US < 50 % bilateral 





Past History


Past Medical History: atrial fib, arrhythmia, arthritis, GERD, heart failure, 

hypertension, renal failure, other (alcoholism,ICD device,rhabdomyalsis)





Medications and Allergies


                                    Allergies











Allergy/AdvReac Type Severity Reaction Status Date / Time


 


No Known Allergies Allergy   Verified 03/27/17 02:41











                                Home Medications











 Medication  Instructions  Recorded  Confirmed  Last Taken  Type


 


Gabapentin 600 mg PO QHS 12/22/18 12/04/19 12/22/18 History


 


Metoprolol Xl [Metoprolol 100 mg PO BID 12/22/18 12/04/19 12/21/18 History





SUCCINATE ER TAB]     


 


Acetaminophen [Acetaminophen TAB] 2 tab PO Q4H PRN #15 tablet 11/07/19 12/04/19 

Unknown Rx


 


Amiodarone [Cordarone 200 MG TAB] 200 mg PO BID #60 tablet 11/07/19 12/04/19 

Unknown Rx


 


HYDROcodone/APAP 5-325 [Norco 1 each PO Q4H PRN #20 tablet 11/07/19 12/04/19 

Unknown Rx





5-325 mg TAB]     


 


Isosorb Dinit/Hydralazine [Bidil 1 each PO Q12HR  tablet 11/07/19 12/04/19 

Unknown Rx





20/37.5MG]     


 


Lisinopril [Zestril TAB] 20 mg PO QDAY  tablet 11/07/19 12/04/19 Unknown Rx


 


Magnesium Oxide [Mag-Ox] 400 mg PO TIDAC #60 tablet 11/07/19 12/04/19 Unknown Rx


 


NIFEdipine [Nifedipine ER] 60 mg PO DAILY #30 tab 11/07/19 12/04/19 12/21/18 Rx


 


Pantoprazole [Protonix TAB] 40 mg PO BID #60 tablet 11/07/19 12/04/19 Unknown Rx


 


Spironolactone [Aldactone] 25 mg PO QDAY #30 tablet 11/07/19 12/04/19 Unknown Rx


 


Thiamine [Vitamin B-1] 100 mg PO QDAY #30 tab 11/07/19 12/04/19 Unknown Rx


 


traZODone [Desyrel] 50 mg PO QHS #30 tab 11/07/19 12/04/19 12/21/18 Rx











Active Meds: 


Active Medications





Acetaminophen (Tylenol)  650 mg PO Q4H PRN


   PRN Reason: Pain MILD(1-3)/Fever >100.5/HA


Acetaminophen/Butalbital/Caffeine (Fioricet)  1 tab PO Q4H PRN


   PRN Reason: Headache


Amiodarone HCl (Cordarone)  200 mg PO BID [facility]


   Last Admin: 12/05/19 11:25 Dose:  200 mg


   Documented by: 


Enoxaparin Sodium (Enoxaparin)  30 mg SUB-Q QDAY [facility]


   Last Admin: 12/05/19 11:20 Dose:  30 mg


   Documented by: 


Gabapentin (Gabapentin)  600 mg PO QHS [facility]


Sodium Chloride (Nacl 0.45% 1000 Ml)  1,000 mls @ 75 mls/hr IV AS DIRECT [facility]


   Last Admin: 12/05/19 05:30 Dose:  50 mls/hr


   Documented by: 


Magnesium Oxide (Mag-Ox)  400 mg PO TIDAC [facility]


   Last Admin: 12/05/19 11:33 Dose:  Not Given


   Documented by: 


Ondansetron HCl (Zofran)  4 mg IV Q8H PRN


   PRN Reason: Nausea And Vomiting


Oxycodone HCl (Roxicodone)  5 mg PO Q4H PRN


   PRN Reason: Pain, Moderate (4-6)


Pantoprazole Sodium (Protonix)  40 mg PO BID [facility]


   Last Admin: 12/05/19 11:20 Dose:  40 mg


   Documented by: 


Sodium Chloride (Sodium Chloride Flush Syringe 10 Ml)  10 ml IV BID [facility]


   Last Admin: 12/05/19 11:26 Dose:  10 ml


   Documented by: 


Sodium Chloride (Sodium Chloride Flush Syringe 10 Ml)  10 ml IV PRN PRN


   PRN Reason: LINE FLUSH


Thiamine HCl (Vitamin B-1)  100 mg PO QDAY [facility]


   Last Admin: 12/05/19 11:20 Dose:  100 mg


   Documented by: 











Review of Systems


Constitutional: weakness, poor appetite, chronic pain


Ears, nose, mouth and throat: headache


Cardiovascular: palpitations, lightheadedness


Gastrointestinal: nausea, vomiting


Musculoskeletal: neck stiffness, neck pain, low back pain, leg numbness/tingling


Neurological: weakness, parathesias


Psychiatric: insomnia





Physical Examination





- Vital Signs


Vital Signs: 


                                   Vital Signs











Temp Pulse Resp BP Pulse Ox


 


 98.2 F   68   18   102/65   98 


 


 12/04/19 15:11  12/04/19 15:11  12/04/19 15:11  12/04/19 15:11  12/04/19 15:11














- Constitutional


General appearance: comfortable





- EENT


EENT: Present: PERRL, hearing intact, vision intact





- Respiratory


Respiratory: Present: chest non-tender, lungs clear, rhonchi





- Cardiovascular


Cardiovascular: Present: regular rate, normal S1, normal S2


Extremities: Present: no peripheral edema bilatateraly, no clubbing, cyanosis





- Gastrointestinal


Gastrointestinal: Present: normoactive bowel sounds





- Integumentary


Integumentary: Present: normal





- Neurologic


Cranial nerve examination: PERRL, EOMI, intact


Speech examination: intact


Sensorimotor examination: intact, other (with subjective decrease sensation in 

feet bilateral )


Detailed motor examination: grossly full strength in


Detailed sensory examination: light touch





- Psychiatric


Psychiatric: Present: cooperative





- Additional Exam


Additional Exam: 





complained of tender neck but no limitation is noted 








Results





- Laboratory Findings


CBC and BMP: 


                                 12/05/19 04:18





                                 12/05/19 04:18


Abnormal Lab Findings: 


                                  Abnormal Labs











  12/04/19 12/04/19 12/04/19





  15:43 15:43 15:43


 


Hgb  11.0 L  


 


Hct  34.0 L  


 


RDW  17.7 H  


 


Mono % (Auto)  8.4 H  


 


Lymph #  0.8 L  


 


Seg Neutrophils %  76.2 H  


 


Carbon Dioxide   17 L 


 


BUN   26 H 


 


Creatinine   3.6 H 


 


Glucose   


 


POC Glucose   


 


AST   157 H 


 


Total Creatine Kinase   


 


CK-MB (CK-2)   


 


Troponin T   0.030 H 


 


Triglycerides   151 H 


 


LDL Cholesterol Direct   42 L 


 


HDL Cholesterol   111 H 


 


Salicylates   


 


Acetaminophen   


 


Plasma/Serum Alcohol    0.11 H














  12/04/19 12/04/19 12/04/19





  21:05 21:05 21:05


 


Hgb   


 


Hct   


 


RDW   


 


Mono % (Auto)   


 


Lymph #   


 


Seg Neutrophils %   


 


Carbon Dioxide   


 


BUN   


 


Creatinine   


 


Glucose   


 


POC Glucose   


 


AST   


 


Total Creatine Kinase  6776 H  


 


CK-MB (CK-2)   


 


Troponin T   


 


Triglycerides   


 


LDL Cholesterol Direct   


 


HDL Cholesterol   


 


Salicylates   < 0.3 L 


 


Acetaminophen    < 5.0 L


 


Plasma/Serum Alcohol   














  12/05/19 12/05/19 12/05/19





  00:20 00:41 04:18


 


Hgb    10.9 L


 


Hct    33.4 L


 


RDW    17.7 H


 


Mono % (Auto)    11.5 H


 


Lymph #    0.8 L


 


Seg Neutrophils %   


 


Carbon Dioxide   


 


BUN   


 


Creatinine   


 


Glucose   


 


POC Glucose   67 L 


 


AST   


 


Total Creatine Kinase  5733 H  


 


CK-MB (CK-2)  113.4 H  


 


Troponin T   


 


Triglycerides   


 


LDL Cholesterol Direct   


 


HDL Cholesterol   


 


Salicylates   


 


Acetaminophen   


 


Plasma/Serum Alcohol   














  12/05/19 12/05/19





  04:18 04:18


 


Hgb  


 


Hct  


 


RDW  


 


Mono % (Auto)  


 


Lymph #  


 


Seg Neutrophils %  


 


Carbon Dioxide  19 L 


 


BUN  27 H 


 


Creatinine  2.9 H 


 


Glucose  58 L 


 


POC Glucose  


 


AST  


 


Total Creatine Kinase   5797 H


 


CK-MB (CK-2)   117.9 H


 


Troponin T  


 


Triglycerides  


 


LDL Cholesterol Direct  


 


HDL Cholesterol  


 


Salicylates  


 


Acetaminophen  


 


Plasma/Serum Alcohol  














Assessment and Plan





Impression


1- This is 59 ys old male with complain of headache for over 6 weeks pain mostly

 in back of head and neck pain radiate to back of head he describe associated 

nausea and rare vomiting ,he is with excessive intake of Advil average daily 12 

tablets in addition to Norco 2-3 times a day finding from hx and exam is 

suggestive of muskelskeltal pain aggravated by underlying cervical spinal 

stenosis .


2- Syncope with regain of consciousness in 1-2 minutes he had no food to eat or 

drink for over 4 days ? he related to pain and headache / nausea 


3- AKD possibly related to above 


4- Hx of paroxysmal AF, VT with CHF and ICD with EF #25-30%


5- recent diagnosis of gastritis related to above he is off eliquis since 

November 2019 


6- Hx of alcoholism according to him he cut down on drinking his last drink was 

on thanks giving with UDS showed alcohol level < 0.11


7- Rhabdomyalsis related to fall ? cpk 6776 he denied seizure 


8- Excessive intake of OTC med and pain medication he is followed in pain clinic


9- Normal Ct brain and US carotid < 50% stenosis


10 Insomnia





PLAN


1- STOP OTC medication, Advil ,Motrin and alcohol drinking


2- better eating and drinking habit


3- DT precaution


4- Thiamin 100 mg Iv X 3 days


5- Change Neurontine to 200 mg tid 


6- trial of Lidocain patch if available daily apply at neck 


7- change trazdon to 100 mg qhs


8- AC when possible to be cleared by GI and followed by cardiology 


9- follow up in pain clinic 


10 DVT precaution


11- Follow up renal function and CPK 





will follow along , finding D/W pt.

## 2019-12-05 NOTE — PROGRESS NOTE
Assessment and Plan


Assessment and plan: 





Rhabdomyolysis 


-improving on IV fluid, will monitor CK level





HERMELINDA likely due to the rhabdomyolysis


-On IV fluid, will monitor creatinine level


-Avoid nephrotoxins


-Renal ultrasound pending


-Nephrology consulted





Syncope and collapse


-Exact cause unknown


-Serial troponin levels negative


-Carotid duplex ultrasound negative for significant stenosis


-Echo pending


-neurology consulted and recs below:


1- STOP OTC medications: Advil ,Motrin and alcohol drinking


2- better eating and drinking habit


3- DT precautions


4- Thiamin 100 mg Iv X 3 days


5- Change Neurontin to 200 mg tid 


6- trial of Lidocaine patch if available daily apply at neck 


7- change trazadone to 100 mg qhs





Migraine headaches


-Started on PRN fioricet





Chronic systolic, EF of 20-25%


-s/p ICD placement


-No acute exacerbation


-Cardiology to interrogate his AICD





Paroxysmal A. fib


-HR controlled


-Continue amiodarone. will resume his home metoprolol


-His Eliquis was discontinued on the last d/c in November due to GIB


-Continue telemetry monitor





Hypertension


-Blood pressure trended up


-His metoprolol resumed, will monitor





Chronic pain syndrome


-On when necessary narcotics





DVT prophylaxis: Lovenox








Disposition: For discharge when medically stable





History


Interval history: 





Patient complained of migraine and generalized body aches





Hospitalist Physical





- Constitutional


Vitals: 


                                        











Temp Pulse Resp BP Pulse Ox


 


 98.3 F   72   18   160/97   99 


 


 12/05/19 12:36  12/05/19 12:36  12/05/19 12:36  12/05/19 12:36  12/05/19 12:36











General appearance: Present: no acute distress, well-nourished





- EENT


Eyes: Present: PERRL, EOM intact


ENT: hearing intact, clear oral mucosa





- Neck


Neck: Present: supple





- Respiratory


Respiratory effort: normal


Respiratory: bilateral: CTA





- Cardiovascular


Rhythm: regular


Heart Sounds: Present: S1 & S2





- Extremities


Extremities: No edema





- Abdominal


General gastrointestinal: soft, non-tender, normal bowel sounds





- Integumentary


Integumentary: Present: warm, dry





- Psychiatric


Psychiatric: cooperative





- Neurologic


Neurologic: moves all extremities





Results





- Labs


CBC & Chem 7: 


                                 12/05/19 04:18





                                 12/05/19 04:18


Labs: 


                             Laboratory Last Values











WBC  4.9 K/mm3 (4.5-11.0)   12/05/19  04:18    


 


RBC  3.78 M/mm3 (3.65-5.03)   12/05/19  04:18    


 


Hgb  10.9 gm/dl (11.8-15.2)  L  12/05/19  04:18    


 


Hct  33.4 % (35.5-45.6)  L  12/05/19  04:18    


 


MCV  89 fl (84-94)   12/05/19  04:18    


 


MCH  29 pg (28-32)   12/05/19  04:18    


 


MCHC  33 % (32-34)   12/05/19  04:18    


 


RDW  17.7 % (13.2-15.2)  H  12/05/19  04:18    


 


Plt Count  208 K/mm3 (140-440)   12/05/19  04:18    


 


Lymph % (Auto)  17.2 % (13.4-35.0)   12/05/19  04:18    


 


Mono % (Auto)  11.5 % (0.0-7.3)  H  12/05/19  04:18    


 


Eos % (Auto)  0.7 % (0.0-4.3)   12/05/19  04:18    


 


Baso % (Auto)  0.8 % (0.0-1.8)   12/05/19  04:18    


 


Lymph #  0.8 K/mm3 (1.2-5.4)  L  12/05/19  04:18    


 


Mono #  0.6 K/mm3 (0.0-0.8)   12/05/19  04:18    


 


Eos #  0.0 K/mm3 (0.0-0.4)   12/05/19  04:18    


 


Baso #  0.0 K/mm3 (0.0-0.1)   12/05/19  04:18    


 


Seg Neutrophils %  69.8 % (40.0-70.0)   12/05/19  04:18    


 


Seg Neutrophils #  3.4 K/mm3 (1.8-7.7)   12/05/19  04:18    


 


PT  13.6 Sec. (12.2-14.9)   12/04/19  15:43    


 


INR  1.05  (0.87-1.13)   12/04/19  15:43    


 


APTT  34.8 Sec. (24.2-36.6)   12/04/19  15:43    


 


Sodium  137 mmol/L (137-145)   12/05/19  04:18    


 


Potassium  4.5 mmol/L (3.6-5.0)   12/05/19  04:18    


 


Chloride  101.3 mmol/L ()   12/05/19  04:18    


 


Carbon Dioxide  19 mmol/L (22-30)  L  12/05/19  04:18    


 


Anion Gap  21 mmol/L  12/05/19  04:18    


 


BUN  27 mg/dL (9-20)  H  12/05/19  04:18    


 


Creatinine  2.9 mg/dL (0.8-1.5)  H  12/05/19  04:18    


 


Estimated GFR  27 ml/min  12/05/19  04:18    


 


BUN/Creatinine Ratio  9 %  12/05/19  04:18    


 


Glucose  58 mg/dL ()  L  12/05/19  04:18    


 


POC Glucose  136  ()  H  12/05/19  12:31    


 


Calcium  8.8 mg/dL (8.4-10.2)   12/05/19  04:18    


 


Magnesium  2.00 mg/dL (1.7-2.3)   12/04/19  21:05    


 


Total Bilirubin  0.40 mg/dL (0.1-1.2)   12/04/19  15:43    


 


AST  157 units/L (5-40)  H  12/04/19  15:43    


 


ALT  45 units/L (7-56)   12/04/19  15:43    


 


Alkaline Phosphatase  52 units/L ()   12/04/19  15:43    


 


Total Creatine Kinase  5797 units/L ()  H  12/05/19  04:18    


 


CK-MB (CK-2)  117.9 ng/mL (0.0-4.0)  H  12/05/19  04:18    


 


CK-MB (CK-2) Rel Index  2.0  (0-4)   12/05/19  04:18    


 


Troponin T  0.016 ng/mL (0.00-0.029)   12/05/19  04:18    


 


Total Protein  7.3 g/dL (6.3-8.2)   12/04/19  15:43    


 


Albumin  4.3 g/dL (3.9-5)   12/04/19  15:43    


 


Albumin/Globulin Ratio  1.4 %  12/04/19  15:43    


 


Triglycerides  151 mg/dL (2-149)  H  12/04/19  15:43    


 


Cholesterol  167 mg/dL ()   12/04/19  15:43    


 


LDL Cholesterol Direct  42 mg/dL ()  L  12/04/19  15:43    


 


HDL Cholesterol  111 mg/dL (40-59)  H  12/04/19  15:43    


 


Cholesterol/HDL Ratio  1.50 %  12/04/19  15:43    


 


Salicylates  < 0.3 mg/dL (2.8-20.0)  L  12/04/19  21:05    


 


Urine Opiates Screen  Presumptive negative   12/05/19  00:32    


 


Urine Methadone Screen  Presumptive negative   12/05/19  00:32    


 


Acetaminophen  < 5.0 ug/mL (10.0-30.0)  L  12/04/19  21:05    


 


Ur Barbiturates Screen  Presumptive negative   12/05/19  00:32    


 


Ur Phencyclidine Scrn  Presumptive negative   12/05/19  00:32    


 


Ur Amphetamines Screen  Presumptive positive   12/05/19  00:32    


 


U Benzodiazepines Scrn  Presumptive negative   12/05/19  00:32    


 


Urine Cocaine Screen  Presumptive positive   12/05/19  00:32    


 


U Marijuana (THC) Screen  Presumptive negative   12/05/19  00:32    


 


Drugs of Abuse Note  Disclamer   12/05/19  00:32    


 


Plasma/Serum Alcohol  0.11 % (0-0.07)  H  12/04/19  15:43    














Active Medications





- Current Medications


Current Medications: 














Generic Name Dose Route Start Last Admin





  Trade Name Freq  PRN Reason Stop Dose Admin


 


Acetaminophen  650 mg  12/04/19 23:26 





  Tylenol  PO  





  Q4H PRN  





  Pain MILD(1-3)/Fever >100.5/HA  


 


Acetaminophen/Butalbital/Caffeine  1 tab  12/05/19 11:49 





  Fioricet  PO  





  Q4H PRN  





  Headache  


 


Amiodarone HCl  200 mg  12/05/19 10:00  12/05/19 11:25





  Cordarone  PO   200 mg





  BID JORDANA   Administration


 


Enoxaparin Sodium  30 mg  12/05/19 10:00  12/05/19 11:20





  Enoxaparin  SUB-Q   30 mg





  QDAY JORDANA   Administration


 


Gabapentin  600 mg  12/05/19 22:00 





  Gabapentin  PO  





  QHS JORDANA  


 


Gabapentin  200 mg  12/05/19 14:00  12/05/19 13:23





  Gabapentin  PO   200 mg





  Q8HR JORDANA   Administration


 


Sodium Chloride  1,000 mls @ 75 mls/hr  12/05/19 01:00  12/05/19 05:30





  Nacl 0.45% 1000 Ml  IV   50 mls/hr





  AS DIRECT JORDANA   Administration


 


Lidocaine  1 each  12/05/19 14:00  12/05/19 13:46





  Lidoderm 5%  TD  01/31/20 23:59  1 each





  QDAY JORDANA   Administration


 


Magnesium Oxide  400 mg  12/05/19 07:30  12/05/19 11:33





  Mag-Ox  PO   Not Given





  TIDAC JORDANA  


 


Metoprolol Succinate  50 mg  12/05/19 14:00 





  Metoprolol Xl  PO  





  QDAY JORDANA  


 


Ondansetron HCl  4 mg  12/04/19 23:26  12/05/19 13:12





  Zofran  IV   4 mg





  Q8H PRN   Administration





  Nausea And Vomiting  


 


Oxycodone HCl  5 mg  12/05/19 11:49  12/05/19 13:12





  Roxicodone  PO   5 mg





  Q4H PRN   Administration





  Pain, Moderate (4-6)  


 


Pantoprazole Sodium  40 mg  12/05/19 10:00  12/05/19 11:20





  Protonix  PO   40 mg





  BID JORDANA   Administration


 


Sodium Chloride  10 ml  12/05/19 10:00  12/05/19 11:26





  Sodium Chloride Flush Syringe 10 Ml  IV   10 ml





  BID JORDANA   Administration


 


Sodium Chloride  10 ml  12/04/19 23:26 





  Sodium Chloride Flush Syringe 10 Ml  IV  





  PRN PRN  





  LINE FLUSH  


 


Thiamine HCl  100 mg  12/05/19 10:00  12/05/19 11:20





  Vitamin B-1  PO   100 mg





  QDAY JORDANA   Administration

## 2019-12-06 LAB
BUN SERPL-MCNC: 30 MG/DL (ref 9–20)
BUN/CREAT SERPL: 23 %
CALCIUM SERPL-MCNC: 8.7 MG/DL (ref 8.4–10.2)
HEMOLYSIS INDEX: 5

## 2019-12-06 RX ADMIN — BUTALBITAL, ACETAMINOPHEN, AND CAFFEINE PRN TAB: 50; 325; 40 TABLET ORAL at 06:44

## 2019-12-06 RX ADMIN — ISOSORBIDE DINITRATE AND HYDRALAZINE HYDROCHLORIDE SCH EACH: 37.5; 2 TABLET ORAL at 21:21

## 2019-12-06 RX ADMIN — NIFEDIPINE SCH MG: 60 TABLET, EXTENDED RELEASE ORAL at 12:44

## 2019-12-06 RX ADMIN — SODIUM CHLORIDE SCH MLS/HR: 0.45 INJECTION, SOLUTION INTRAVENOUS at 21:19

## 2019-12-06 RX ADMIN — PANTOPRAZOLE SODIUM SCH MG: 40 TABLET, DELAYED RELEASE ORAL at 10:09

## 2019-12-06 RX ADMIN — Medication SCH ML: at 10:10

## 2019-12-06 RX ADMIN — ISOSORBIDE DINITRATE AND HYDRALAZINE HYDROCHLORIDE SCH EACH: 37.5; 2 TABLET ORAL at 12:43

## 2019-12-06 RX ADMIN — GABAPENTIN SCH MG: 100 CAPSULE ORAL at 21:21

## 2019-12-06 RX ADMIN — GABAPENTIN SCH MG: 100 CAPSULE ORAL at 06:43

## 2019-12-06 RX ADMIN — Medication SCH MG: at 08:10

## 2019-12-06 RX ADMIN — Medication SCH MG: at 10:09

## 2019-12-06 RX ADMIN — GABAPENTIN SCH MG: 100 CAPSULE ORAL at 13:25

## 2019-12-06 RX ADMIN — METOPROLOL SUCCINATE SCH MG: 50 TABLET, EXTENDED RELEASE ORAL at 10:09

## 2019-12-06 RX ADMIN — LIDOCAINE SCH EACH: 50 PATCH TOPICAL at 10:10

## 2019-12-06 RX ADMIN — ENOXAPARIN SODIUM SCH MG: 100 INJECTION SUBCUTANEOUS at 10:10

## 2019-12-06 RX ADMIN — Medication SCH ML: at 21:22

## 2019-12-06 RX ADMIN — AMIODARONE HYDROCHLORIDE SCH MG: 200 TABLET ORAL at 21:21

## 2019-12-06 RX ADMIN — PANTOPRAZOLE SODIUM SCH MG: 40 TABLET, DELAYED RELEASE ORAL at 21:21

## 2019-12-06 RX ADMIN — AMIODARONE HYDROCHLORIDE SCH MG: 200 TABLET ORAL at 10:09

## 2019-12-06 RX ADMIN — BUTALBITAL, ACETAMINOPHEN, AND CAFFEINE PRN TAB: 50; 325; 40 TABLET ORAL at 19:36

## 2019-12-06 RX ADMIN — Medication SCH MG: at 12:44

## 2019-12-06 NOTE — PROGRESS NOTE
Assessment and Plan





Impression


1- This is 59 ys old male with complain of headache for over 6 weeks pain mostly

in back of head and neck pain radiate to back of head he describe associated 

nausea and rare vomiting ,he is with excessive intake of Advil average daily 12 

tablets in addition to Norco 2-3 times a day finding from hx and exam is 

suggestive of muskelskeltal pain aggravated by underlying cervical spinal 

stenosis .


2- Syncope with regain of consciousness in 1-2 minutes he had no food to eat or 

drink for over 4 days ? he related to pain and headache / nausea 


3- AKD possibly related to above 


4- Hx of paroxysmal AF, VT with CHF and ICD with EF #25-30% recent ECHO showed 

EF improved to 50-55% 


5- recent diagnosis of gastritis related to above he is off eliquis since 

November 2019 


6- Hx of alcoholism according to him he cut down on drinking his last drink was 

on thanks giving with UDS showed alcohol level < 0.11


7- Rhabdomyalsis related to fall ? cpk [id number] he denied seizure 


8- Excessive intake of OTC med and pain medication he is followed in pain clinic


9- Normal Ct brain and US carotid < 50% stenosis


10 Insomnia improved he is off trazdon due to  increase AV block  


PLAN


1- STOP OTC medication, Advil ,Motrin and alcohol drinking


2- better eating and drinking habit


3- DT precaution


4- Thiamin 100 mg Iv X 3 days


5- Change Neurontine to 200 mg tid 


6- trial of Lidocain patch if available daily apply at neck 


7- stop trazdon  due to drug interaction with amiodaron 


8- AC when possible to be cleared by GI and followed by cardiology 


9- follow up in pain clinic 


10 DVT precaution


11- Follow up renal function and CPK 


12- Reglan 5 mg IV prn for headache according to him nicholast is not helping


13- PT therapy evaluation 





will follow along , finding D/W pt.











Subjective


Date of service: 12/06/19


Principal diagnosis: syncope, neck and back pain


Interval history: 





pt. felt slightly better today according to him he got may be 5 hours sleep last

night , still complains of headache left> right non throbbing slight nausea.


neck and back pain better some what with lidoderm patch 


Echo this visit showed improved EF 50-55% with monitor showed no V.tachycardia 

to explain his syncope .


 





Objective





- Vital Sign


                               Vital Signs - 12hr











  12/06/19 12/06/19 12/06/19





  00:00 00:14 04:32


 


Temperature  98.3 F 97.6 F


 


Pulse Rate 73 64 70


 


Pulse Rate [ 70  





Left Radial]   


 


Pulse Rate [ 70  





Right Radial]   


 


Respiratory 20 16 18





Rate   


 


Blood Pressure  104/72 121/79


 


O2 Sat by Pulse 97 98 98





Oximetry   














  12/06/19 12/06/19 12/06/19





  06:34 06:36 06:38


 


Temperature   


 


Pulse Rate 70 72 76


 


Pulse Rate [   





Left Radial]   


 


Pulse Rate [   





Right Radial]   


 


Respiratory   





Rate   


 


Blood Pressure   


 


O2 Sat by Pulse 97 97 98





Oximetry   














  12/06/19 12/06/19





  08:31 10:09


 


Temperature 98.1 F 


 


Pulse Rate 75 75


 


Pulse Rate [  





Left Radial]  


 


Pulse Rate [  





Right Radial]  


 


Respiratory 18 





Rate  


 


Blood Pressure 144/104 144/104


 


O2 Sat by Pulse 97 





Oximetry  














- General Apperance


Constitutional: uncomfortable





- EENT


EENT: PERRL





- Respiratory


Respiratory: chest non-tender, lungs clear, rhonchi





- Cardiovascular


Cardiovascular: regular rate, normal S1, normal S2


Extremities: no peripheral edema bilat





- Gastrointestinal


Gastrointestinal: normoactive bowel sounds





- Integumentary


Integumentary: normal





- Neurologic


Cranial nerve examination: PERRL, EOMI


Speech examination: intact


Detailed motor examination: grossly full strength in





- Psychiatric


Psychiatric: mood/affect appropriate





- Laboratory Findings


CBC and BMP: 


                                 12/05/19 04:18





                                 12/06/19 05:20


Abnormal Lab Findings: 


                                  Abnormal Labs











  12/04/19 12/04/19 12/04/19





  15:43 15:43 15:43


 


Hgb  11.0 L  


 


Hct  34.0 L  


 


RDW  17.7 H  


 


Mono % (Auto)  8.4 H  


 


Lymph #  0.8 L  


 


Seg Neutrophils %  76.2 H  


 


Sodium   


 


Carbon Dioxide   17 L 


 


BUN   26 H 


 


Creatinine   3.6 H 


 


Glucose   


 


POC Glucose   


 


AST   157 H 


 


Total Creatine Kinase   


 


CK-MB (CK-2)   


 


Troponin T   0.030 H 


 


Triglycerides   151 H 


 


LDL Cholesterol Direct   42 L 


 


HDL Cholesterol   111 H 


 


Salicylates   


 


Acetaminophen   


 


Plasma/Serum Alcohol    0.11 H














  12/04/19 12/04/19 12/04/19





  21:05 21:05 21:05


 


Hgb   


 


Hct   


 


RDW   


 


Mono % (Auto)   


 


Lymph #   


 


Seg Neutrophils %   


 


Sodium   


 


Carbon Dioxide   


 


BUN   


 


Creatinine   


 


Glucose   


 


POC Glucose   


 


AST   


 


Total Creatine Kinase  6776 H  


 


CK-MB (CK-2)   


 


Troponin T   


 


Triglycerides   


 


LDL Cholesterol Direct   


 


HDL Cholesterol   


 


Salicylates   < 0.3 L 


 


Acetaminophen    < 5.0 L


 


Plasma/Serum Alcohol   














  12/05/19 12/05/19 12/05/19





  00:20 00:41 04:18


 


Hgb    10.9 L


 


Hct    33.4 L


 


RDW    17.7 H


 


Mono % (Auto)    11.5 H


 


Lymph #    0.8 L


 


Seg Neutrophils %   


 


Sodium   


 


Carbon Dioxide   


 


BUN   


 


Creatinine   


 


Glucose   


 


POC Glucose   67 L 


 


AST   


 


Total Creatine Kinase  5733 H  


 


CK-MB (CK-2)  113.4 H  


 


Troponin T   


 


Triglycerides   


 


LDL Cholesterol Direct   


 


HDL Cholesterol   


 


Salicylates   


 


Acetaminophen   


 


Plasma/Serum Alcohol   














  12/05/19 12/05/19 12/05/19





  04:18 04:18 12:31


 


Hgb   


 


Hct   


 


RDW   


 


Mono % (Auto)   


 


Lymph #   


 


Seg Neutrophils %   


 


Sodium   


 


Carbon Dioxide  19 L  


 


BUN  27 H  


 


Creatinine  2.9 H  


 


Glucose  58 L  


 


POC Glucose    136 H


 


AST   


 


Total Creatine Kinase   5797 H 


 


CK-MB (CK-2)   117.9 H 


 


Troponin T   


 


Triglycerides   


 


LDL Cholesterol Direct   


 


HDL Cholesterol   


 


Salicylates   


 


Acetaminophen   


 


Plasma/Serum Alcohol   














  12/05/19 12/06/19





  17:04 05:20


 


Hgb  


 


Hct  


 


RDW  


 


Mono % (Auto)  


 


Lymph #  


 


Seg Neutrophils %  


 


Sodium   135 L


 


Carbon Dioxide  


 


BUN   30 H


 


Creatinine  


 


Glucose  


 


POC Glucose  109 H 


 


AST  


 


Total Creatine Kinase   2468 H


 


CK-MB (CK-2)  


 


Troponin T  


 


Triglycerides  


 


LDL Cholesterol Direct  


 


HDL Cholesterol  


 


Salicylates  


 


Acetaminophen  


 


Plasma/Serum Alcohol

## 2019-12-06 NOTE — PROGRESS NOTE
Assessment and Plan





Syncope


Headaches


Rhabdomyolysis


Acute kidney injury


Dehydration


Paroxysmal Afib


   currently in sinus rhythm; on amiodarone and metoprolol for suppression


   Eliquis previously held due to upper GI bleed


Hx of Ventricular tachycardia


Hx of Nonischemic cardiomyopathy, resolving


   [facility] 2017: normal coronaries, EF 25-30%.


   EF now 50-55% by echocardiogram this admission.


Presence of [employer] AICD


   interrogation revealed no therapies surrounding the syncopal episode. Normal 

functioning device.


Hypertension





Recommendations:


Continue medical therapy for paroxysmal atrial fibrillation.


Otherwise, conservative cardiac management.





Subjective


Date of service: 12/06/19


Interval history: 





Patient has no cardiac complaints. No cardiac events overnight.


Device interrogation revealed SVT on 11/21/19 but no therapy required. No 

arrhythmias noted surrounding the syncopal episode.





Objective


                                   Vital Signs











  Temp Pulse Pulse Pulse Resp BP BP


 


 12/06/19 08:31  98.1 F  75    18  144/104 


 


 12/06/19 06:38   76     


 


 12/06/19 06:36   72     


 


 12/06/19 06:34   70     


 


 12/06/19 04:32  97.6 F  70    18  121/79 


 


 12/06/19 00:14  98.3 F  64    16  104/72 


 


 12/06/19 00:00   73  70  70  20  


 


 12/05/19 22:28       


 


 12/05/19 21:29   72      126/85


 


 12/05/19 21:27      20  


 


 12/05/19 21:23       126/85 


 


 12/05/19 19:38  98.8 F  75    16  94/59 


 


 12/05/19 17:00  98.5 F  73    16   82/57


 


 12/05/19 13:51   96 H     160/97 


 


 12/05/19 12:36  98.3 F  72    18   160/97


 


 12/05/19 12:00      18  














  Pulse Ox


 


 12/06/19 08:31  97


 


 12/06/19 06:38  98


 


 12/06/19 06:36  97


 


 12/06/19 06:34  97


 


 12/06/19 04:32  98


 


 12/06/19 00:14  98


 


 12/06/19 00:00  97


 


 12/05/19 22:28  97


 


 12/05/19 21:29 


 


 12/05/19 21:27 


 


 12/05/19 21:23 


 


 12/05/19 19:38  95


 


 12/05/19 17:00  97


 


 12/05/19 13:51 


 


 12/05/19 12:36  99


 


 12/05/19 12:00 














- Physical Examination


General: No Apparent Distress


HEENT: Positive: PERRL


Neck: Positive: trachea midline


Cardiac: Positive: Reg Rate and Rhythm


Lungs: Positive: Decreased Breath Sounds


Neuro: Positive: Grossly Intact


Extremities: Absent: edema





- Labs and Meds


                          Comprehensive Metabolic Panel











  12/06/19 Range/Units





  05:20 


 


Sodium  135 L  (137-145)  mmol/L


 


Potassium  4.4  (3.6-5.0)  mmol/L


 


Chloride  100.3  ()  mmol/L


 


Carbon Dioxide  22  (22-30)  mmol/L


 


BUN  30 H  (9-20)  mg/dL


 


Creatinine  1.3  D  (0.8-1.5)  mg/dL


 


Glucose  100  ()  mg/dL


 


Calcium  8.7  (8.4-10.2)  mg/dL

## 2019-12-06 NOTE — PROGRESS NOTE
Assessment and Plan


Assessment and plan: 





Rhabdomyolysis 


-improving on IV fluid, will monitor CK level





HERMELINDA likely due to the rhabdomyolysis


-Improving on IV fluid, will monitor creatinine level


-Avoid nephrotoxins


-Renal ultrasound negative for hydronephrosis


-Nephrology following





Syncope and collapse


-Exact cause unknown


-Serial troponin levels negative


-Carotid duplex ultrasound negative for significant stenosis


-Echo showed EF of 50-55%, which is improved from 20-25% in 2017


-neurology consulted and recommended:


1- STOP OTC medications: Advil ,Motrin and alcohol drinking


2- better eating and drinking habit


3- DT precautions


4- Thiamine 100 mg X 3 days


5- Change Neurontin to 200 mg tid 


6- trial of Lidocaine patch if available daily apply at neck 


7- change trazadone to 100 mg qhs





Migraine headaches


-will give sumatriptan 50 mg x2 doses


-cont PRN fioricet





H/o Chronic systolic HF with EF of 20-25% per echo on 03/01/2017


-s/p ICD placement


-Recent echo on 12/04/2019 showed improved EF of 50-55%


-AICD interrogated on 12/05/2019, no issues





Paroxysmal A. fib


-HR controlled


-Continue amiodarone and metoprolol


-His Eliquis was discontinued during last d/c in November due to GIB


-Continue telemetry monitor





Hypertension


-BP fairly controlled


-Continue amiodarone and metoprolol


-We resume home bidil and nifedipine


-Will monitor blood pressure





Chronic pain syndrome


-On when necessary narcotics and lidocaine patch





GERD


-Continue Protonix





Deconditioning


-PT consulted





DVT prophylaxis: Lovenox








Disposition: For possible discharge in a.m. if clinically stable





History


Interval history: 





Patient continues to complain of migraine headaches.  He also reports poor 

mobility.





Hospitalist Physical





- Constitutional


Vitals: 


                                        











Temp Pulse Resp BP Pulse Ox


 


 98.1 F   75   18   144/104   97 


 


 12/06/19 08:31  12/06/19 10:09  12/06/19 08:31  12/06/19 10:09  12/06/19 08:31











General appearance: Present: no acute distress, well-nourished





- EENT


Eyes: Present: PERRL, EOM intact


ENT: hearing intact, clear oral mucosa





- Neck


Neck: Present: supple





- Respiratory


Respiratory effort: normal


Respiratory: bilateral: CTA





- Cardiovascular


Rhythm: regular


Heart Sounds: Present: S1 & S2





- Extremities


Extremities: No edema





- Abdominal


General gastrointestinal: soft, non-tender, normal bowel sounds





- Integumentary


Integumentary: Present: warm, dry





- Psychiatric


Psychiatric: cooperative





- Neurologic


Neurologic: moves all extremities





Results





- Labs


CBC & Chem 7: 


                                 12/05/19 04:18





                                 12/06/19 05:20


Labs: 


                             Laboratory Last Values











WBC  4.9 K/mm3 (4.5-11.0)   12/05/19  04:18    


 


RBC  3.78 M/mm3 (3.65-5.03)   12/05/19  04:18    


 


Hgb  10.9 gm/dl (11.8-15.2)  L  12/05/19  04:18    


 


Hct  33.4 % (35.5-45.6)  L  12/05/19  04:18    


 


MCV  89 fl (84-94)   12/05/19  04:18    


 


MCH  29 pg (28-32)   12/05/19  04:18    


 


MCHC  33 % (32-34)   12/05/19  04:18    


 


RDW  17.7 % (13.2-15.2)  H  12/05/19  04:18    


 


Plt Count  208 K/mm3 (140-440)   12/05/19  04:18    


 


Lymph % (Auto)  17.2 % (13.4-35.0)   12/05/19  04:18    


 


Mono % (Auto)  11.5 % (0.0-7.3)  H  12/05/19  04:18    


 


Eos % (Auto)  0.7 % (0.0-4.3)   12/05/19  04:18    


 


Baso % (Auto)  0.8 % (0.0-1.8)   12/05/19  04:18    


 


Lymph #  0.8 K/mm3 (1.2-5.4)  L  12/05/19  04:18    


 


Mono #  0.6 K/mm3 (0.0-0.8)   12/05/19  04:18    


 


Eos #  0.0 K/mm3 (0.0-0.4)   12/05/19  04:18    


 


Baso #  0.0 K/mm3 (0.0-0.1)   12/05/19  04:18    


 


Seg Neutrophils %  69.8 % (40.0-70.0)   12/05/19  04:18    


 


Seg Neutrophils #  3.4 K/mm3 (1.8-7.7)   12/05/19  04:18    


 


PT  13.6 Sec. (12.2-14.9)   12/04/19  15:43    


 


INR  1.05  (0.87-1.13)   12/04/19  15:43    


 


APTT  34.8 Sec. (24.2-36.6)   12/04/19  15:43    


 


Sodium  135 mmol/L (137-145)  L  12/06/19  05:20    


 


Potassium  4.4 mmol/L (3.6-5.0)   12/06/19  05:20    


 


Chloride  100.3 mmol/L ()   12/06/19  05:20    


 


Carbon Dioxide  22 mmol/L (22-30)   12/06/19  05:20    


 


Anion Gap  17 mmol/L  12/06/19  05:20    


 


BUN  30 mg/dL (9-20)  H  12/06/19  05:20    


 


Creatinine  1.3 mg/dL (0.8-1.5)  D 12/06/19  05:20    


 


Estimated GFR  > 60 ml/min  12/06/19  05:20    


 


BUN/Creatinine Ratio  23 %  12/06/19  05:20    


 


Glucose  100 mg/dL ()   12/06/19  05:20    


 


POC Glucose  89  ()   12/06/19  08:38    


 


Calcium  8.7 mg/dL (8.4-10.2)   12/06/19  05:20    


 


Magnesium  2.00 mg/dL (1.7-2.3)   12/04/19  21:05    


 


Total Bilirubin  0.40 mg/dL (0.1-1.2)   12/04/19  15:43    


 


AST  157 units/L (5-40)  H  12/04/19  15:43    


 


ALT  45 units/L (7-56)   12/04/19  15:43    


 


Alkaline Phosphatase  52 units/L ()   12/04/19  15:43    


 


Total Creatine Kinase  2468 units/L ()  H  12/06/19  05:20    


 


CK-MB (CK-2)  117.9 ng/mL (0.0-4.0)  H  12/05/19  04:18    


 


CK-MB (CK-2) Rel Index  2.0  (0-4)   12/05/19  04:18    


 


Troponin T  0.016 ng/mL (0.00-0.029)   12/05/19  04:18    


 


Total Protein  7.3 g/dL (6.3-8.2)   12/04/19  15:43    


 


Albumin  4.3 g/dL (3.9-5)   12/04/19  15:43    


 


Albumin/Globulin Ratio  1.4 %  12/04/19  15:43    


 


Triglycerides  151 mg/dL (2-149)  H  12/04/19  15:43    


 


Cholesterol  167 mg/dL ()   12/04/19  15:43    


 


LDL Cholesterol Direct  42 mg/dL ()  L  12/04/19  15:43    


 


HDL Cholesterol  111 mg/dL (40-59)  H  12/04/19  15:43    


 


Cholesterol/HDL Ratio  1.50 %  12/04/19  15:43    


 


Salicylates  < 0.3 mg/dL (2.8-20.0)  L  12/04/19  21:05    


 


Urine Opiates Screen  Presumptive negative   12/05/19  00:32    


 


Urine Methadone Screen  Presumptive negative   12/05/19  00:32    


 


Acetaminophen  < 5.0 ug/mL (10.0-30.0)  L  12/04/19  21:05    


 


Ur Barbiturates Screen  Presumptive negative   12/05/19  00:32    


 


Ur Phencyclidine Scrn  Presumptive negative   12/05/19  00:32    


 


Ur Amphetamines Screen  Presumptive positive   12/05/19  00:32    


 


U Benzodiazepines Scrn  Presumptive negative   12/05/19  00:32    


 


Urine Cocaine Screen  Presumptive positive   12/05/19  00:32    


 


U Marijuana (THC) Screen  Presumptive negative   12/05/19  00:32    


 


Drugs of Abuse Note  Disclamer   12/05/19  00:32    


 


Plasma/Serum Alcohol  0.11 % (0-0.07)  H  12/04/19  15:43    














Active Medications





- Current Medications


Current Medications: 














Generic Name Dose Route Start Last Admin





  Trade Name Freq  PRN Reason Stop Dose Admin


 


Acetaminophen  650 mg  12/04/19 23:26 





  Tylenol  PO  





  Q4H PRN  





  Pain MILD(1-3)/Fever >100.5/HA  


 


Acetaminophen/Butalbital/Caffeine  1 tab  12/05/19 11:49  12/06/19 06:44





  Fioricet  PO   1 tab





  Q4H PRN   Administration





  Headache  


 


Amiodarone HCl  200 mg  12/05/19 10:00  12/06/19 10:09





  Cordarone  PO   200 mg





  BID JORDANA   Administration


 


Diphenhydramine HCl  25 mg  12/05/19 23:24 





  Benadryl  PO  





  QHS PRN  





  Sleep  


 


Enoxaparin Sodium  40 mg  12/07/19 10:00 





  Enoxaparin  SUB-Q  





  QDAY@1000 JORDANA  


 


Gabapentin  200 mg  12/05/19 14:00  12/06/19 06:43





  Gabapentin  PO   200 mg





  Q8HR JORDANA   Administration


 


Sodium Chloride  1,000 mls @ 75 mls/hr  12/05/19 01:00  12/05/19 21:24





  Nacl 0.45% 1000 Ml  IV   50 mls/hr





  AS DIRECT JORDANA   Administration


 


Lidocaine  1 each  12/05/19 14:00  12/06/19 10:10





  Lidoderm 5%  TD  01/31/20 23:59  1 each





  QDAY JORDANA   Administration


 


Magnesium Oxide  400 mg  12/05/19 07:30  12/06/19 08:10





  Mag-Ox  PO   400 mg





  TIDAC JORDANA   Administration


 


Metoprolol Succinate  50 mg  12/05/19 14:00  12/06/19 10:09





  Metoprolol Xl  PO   50 mg





  QDAY JORDANA   Administration


 


Ondansetron HCl  4 mg  12/04/19 23:26  12/05/19 13:12





  Zofran  IV   4 mg





  Q8H PRN   Administration





  Nausea And Vomiting  


 


Oxycodone HCl  5 mg  12/05/19 11:49  12/05/19 13:12





  Roxicodone  PO   5 mg





  Q4H PRN   Administration





  Pain, Moderate (4-6)  


 


Pantoprazole Sodium  40 mg  12/05/19 10:00  12/06/19 10:09





  Protonix  PO   40 mg





  BID JORDANA   Administration


 


Sodium Chloride  10 ml  12/05/19 10:00  12/06/19 10:10





  Sodium Chloride Flush Syringe 10 Ml  IV   10 ml





  BID JORDANA   Administration


 


Sodium Chloride  10 ml  12/04/19 23:26 





  Sodium Chloride Flush Syringe 10 Ml  IV  





  PRN PRN  





  LINE FLUSH  


 


Thiamine HCl  100 mg  12/05/19 10:00  12/06/19 10:09





  Vitamin B-1  PO   100 mg





  QDAY JORDANA   Administration

## 2019-12-07 VITALS — SYSTOLIC BLOOD PRESSURE: 134 MMHG | DIASTOLIC BLOOD PRESSURE: 90 MMHG

## 2019-12-07 LAB
BUN SERPL-MCNC: 16 MG/DL (ref 9–20)
BUN/CREAT SERPL: 23 %
CALCIUM SERPL-MCNC: 8.6 MG/DL (ref 8.4–10.2)
HEMOLYSIS INDEX: 9

## 2019-12-07 RX ADMIN — GABAPENTIN SCH MG: 100 CAPSULE ORAL at 05:37

## 2019-12-07 RX ADMIN — Medication SCH MG: at 10:24

## 2019-12-07 RX ADMIN — METOPROLOL SUCCINATE SCH MG: 50 TABLET, EXTENDED RELEASE ORAL at 10:25

## 2019-12-07 RX ADMIN — Medication SCH ML: at 10:26

## 2019-12-07 RX ADMIN — AMIODARONE HYDROCHLORIDE SCH MG: 200 TABLET ORAL at 10:24

## 2019-12-07 RX ADMIN — LIDOCAINE SCH EACH: 50 PATCH TOPICAL at 10:23

## 2019-12-07 RX ADMIN — BUTALBITAL, ACETAMINOPHEN, AND CAFFEINE PRN TAB: 50; 325; 40 TABLET ORAL at 10:25

## 2019-12-07 RX ADMIN — PANTOPRAZOLE SODIUM SCH MG: 40 TABLET, DELAYED RELEASE ORAL at 10:24

## 2019-12-07 RX ADMIN — ISOSORBIDE DINITRATE AND HYDRALAZINE HYDROCHLORIDE SCH EACH: 37.5; 2 TABLET ORAL at 10:24

## 2019-12-07 RX ADMIN — GABAPENTIN SCH MG: 100 CAPSULE ORAL at 13:22

## 2019-12-07 RX ADMIN — NIFEDIPINE SCH MG: 60 TABLET, EXTENDED RELEASE ORAL at 10:24

## 2019-12-07 NOTE — PROGRESS NOTE
Assessment and Plan





Syncope


Headaches


Rhabdomyolysis


Acute kidney injury


Dehydration


Paroxysmal Afib


   currently in sinus rhythm; on amiodarone and metoprolol for suppression


   Eliquis previously held due to upper GI bleed


Hx of Ventricular tachycardia


Hx of Nonischemic cardiomyopathy - continue current goal directed medical 

therapy


   [facility] 2017: normal coronaries, EF 25-30%.


   EF now 50-55% by echocardiogram this admission.


Presence of [employer] AICD


   interrogation revealed no therapies surrounding the syncopal episode. Normal 

functioning device.


Hypertension








Subjective


Date of service: 12/07/19


Principal diagnosis: syncope, neck and back pain


Interval history: 





No acute events. Resting comfortably. No chest pain or SOB. 





Objective


                                   Vital Signs











  Temp Pulse Pulse Resp BP Pulse Ox


 


 12/07/19 08:03     18   97


 


 12/07/19 04:58  98.1 F  69   18  112/70  96


 


 12/07/19 00:51  98.4 F  63   18  94/52  96


 


 12/06/19 21:21   76    92/56 


 


 12/06/19 20:18  99.0 F  76   18  92/56  94


 


 12/06/19 20:00   68    


 


 12/06/19 17:53     18  116/81 


 


 12/06/19 13:29       98


 


 12/06/19 12:43   72    141/93 


 


 12/06/19 11:52   72    141/93  98


 


 12/06/19 11:39    70  18   98


 


 12/06/19 10:09   75    144/104 














- Physical Examination


General: No Apparent Distress


HEENT: Positive: PERRL


Neck: Positive: trachea midline


Neuro: Positive: Grossly Intact


Extremities: Absent: edema





- Labs and Meds


                          Comprehensive Metabolic Panel











  12/07/19 Range/Units





  03:56 


 


Sodium  137  (137-145)  mmol/L


 


Potassium  4.3  (3.6-5.0)  mmol/L


 


Chloride  99.2  ()  mmol/L


 


Carbon Dioxide  21 L  (22-30)  mmol/L


 


BUN  16  (9-20)  mg/dL


 


Creatinine  0.7 L  (0.8-1.5)  mg/dL


 


Glucose  134 H  ()  mg/dL


 


Calcium  8.6  (8.4-10.2)  mg/dL

## 2019-12-07 NOTE — DISCHARGE SUMMARY
Providers





- Providers


Date of Admission: 


12/05/19 10:05





Date of discharge: 12/07/19


Attending physician: 


NUVIA GERONIMO





                                        





12/04/19 23:26


Consult to Physician [CONS] Routine 


   Comment: 


   Consulting Provider: MARÍA BAKER


   Physician Instructions: 


   Reason For Exam: syncope





12/05/19 00:18


Consult to Physician [CONS] Routine 


   Comment: 


   Consulting Provider: IRVIN CAMPBELL


   Physician Instructions: 


   Reason For Exam: persistent HA





12/06/19 08:39


Physical Therapy Evaluation and Treat [CONS] Routine 


   Comment: neck and back pain


   Reason For Exam: deconditioning











Primary care physician: 


FRANCHESCA TOPETE








Hospitalization


Reason for admission: Rhabdomyolysis, HERMELINDA 


Condition: Stable


Pertinent studies: 





-Renal ultrasound negative for hydronephrosis


-Echo on 12/04/2019 showed improved EF of 50-55%


Procedures: 





None


Hospital course: 





Final discharge diagnosis:


Rhabdomyolysis 


HERMELINDA likely due to the rhabdomyolysis


Syncope and collapse, exact cause unknown


Migraine headaches


H/o Chronic systolic HF with EF of 20-25% per echo on 03/01/2017 s/p ICD 

placement, now improved to EF of 50-55% per recent echo


Paroxysmal A. fib


Hypertension


Chronic pain syndrome


GERD


Hx of GIB


Constipation


Deconditioning


Hx of alcohol abuse





Hospital course:





Pt was admitted and placed on IVF and PRN narcotics for pain control. Further 

evaluation for the syncope was done with carotid duplex US and echo which showed

 no significant abns. Pt was also tried on fioricet and lidocaine patch for his 

headaches and chronic pain, which he reported did not help. His CK and cr levels

 improved and he then deemed stable for d/c with clinic f/u. 


Of note, pt was no pleased that his headaches were not relieved, but I had 

extensive discussion with him and his wife, about the need for him to f/u with a

 neurologist on out-pt for continued trial with other medications. He was also 

instructed to f/u with the pain clinic for his chronic pain since nothing seems 

to have helped with his pain. 


Disposition: DC-01 TO HOME OR SELFCARE


Time spent for discharge: 40 mins





Core Measure Documentation





- Palliative Care


Palliative Care/ Comfort Measures: Not Applicable





- Core Measures


Any of the following diagnoses?: none





Exam





- Constitutional


Vitals: 


                                        











Temp Pulse Resp BP Pulse Ox


 


 98.1 F   74   18   134/90   97 


 


 12/07/19 04:58  12/07/19 10:25  12/07/19 08:03  12/07/19 10:25  12/07/19 08:03











General appearance: Present: no acute distress, well-nourished





- EENT


Eyes: Present: PERRL, EOM intact


ENT: hearing intact, clear oral mucosa





- Neck


Neck: Present: supple, normal ROM





- Respiratory


Respiratory effort: normal


Respiratory: bilateral: CTA





- Cardiovascular


Rhythm: regular


Heart Sounds: Present: S1 & S2.  Absent: rub, click





- Extremities


Extremities: No edema





- Abdominal


General gastrointestinal: Present: soft, non-tender, non-distended, normal bowel

 sounds





- Integumentary


Integumentary: Present: clear, warm, dry





- Musculoskeletal


Musculoskeletal: gait normal, strength equal bilaterally





- Psychiatric


Psychiatric: appropriate mood/affect, intact judgment & insight





- Neurologic


Neurologic: CNII-XII intact, moves all extremities





Plan


Follow up with: 


FRANCHESCA TOPETE MD [Primary Care Provider] - 7 Days


Prescriptions: 


Bisacodyl [Dulcolax] 10 mg PO DAILY #30 tab


Gabapentin 200 mg PO Q8HR #180 capsule

## 2020-05-04 ENCOUNTER — HOSPITAL ENCOUNTER (EMERGENCY)
Dept: HOSPITAL 5 - ED | Age: 60
Discharge: HOME | End: 2020-05-04
Payer: MEDICARE

## 2020-05-04 VITALS — SYSTOLIC BLOOD PRESSURE: 108 MMHG | DIASTOLIC BLOOD PRESSURE: 73 MMHG

## 2020-05-04 DIAGNOSIS — I50.9: ICD-10-CM

## 2020-05-04 DIAGNOSIS — M19.90: ICD-10-CM

## 2020-05-04 DIAGNOSIS — F10.129: Primary | ICD-10-CM

## 2020-05-04 DIAGNOSIS — Z87.891: ICD-10-CM

## 2020-05-04 DIAGNOSIS — Z98.890: ICD-10-CM

## 2020-05-04 DIAGNOSIS — Z79.899: ICD-10-CM

## 2020-05-04 DIAGNOSIS — J45.909: ICD-10-CM

## 2020-05-04 DIAGNOSIS — R55: ICD-10-CM

## 2020-05-04 DIAGNOSIS — I11.0: ICD-10-CM

## 2020-05-04 LAB
ALBUMIN SERPL-MCNC: 3.6 G/DL (ref 3.9–5)
ALT SERPL-CCNC: 7 UNITS/L (ref 7–56)
BASOPHILS # (AUTO): 0.1 K/MM3 (ref 0–0.1)
BASOPHILS NFR BLD AUTO: 2.2 % (ref 0–1.8)
BENZODIAZEPINES SCREEN,URINE: (no result)
BUN SERPL-MCNC: 17 MG/DL (ref 9–20)
BUN/CREAT SERPL: 17 %
CALCIUM SERPL-MCNC: 9.3 MG/DL (ref 8.4–10.2)
EOSINOPHIL # BLD AUTO: 0 K/MM3 (ref 0–0.4)
EOSINOPHIL NFR BLD AUTO: 0.5 % (ref 0–4.3)
HCT VFR BLD CALC: 30.6 % (ref 35.5–45.6)
HEMOLYSIS INDEX: 3
HGB BLD-MCNC: 10.5 GM/DL (ref 11.8–15.2)
INR PPP: 1 (ref 0.87–1.13)
LYMPHOCYTES # BLD AUTO: 1.3 K/MM3 (ref 1.2–5.4)
LYMPHOCYTES NFR BLD AUTO: 34.9 % (ref 13.4–35)
MCHC RBC AUTO-ENTMCNC: 34 % (ref 32–34)
MCV RBC AUTO: 86 FL (ref 84–94)
METHADONE SCREEN,URINE: (no result)
MONOCYTES # (AUTO): 0.3 K/MM3 (ref 0–0.8)
MONOCYTES % (AUTO): 8.2 % (ref 0–7.3)
OPIATE SCREEN,URINE: (no result)
PLATELET # BLD: 292 K/MM3 (ref 140–440)
RBC # BLD AUTO: 3.55 M/MM3 (ref 3.65–5.03)

## 2020-05-04 PROCEDURE — 82550 ASSAY OF CK (CPK): CPT

## 2020-05-04 PROCEDURE — 80320 DRUG SCREEN QUANTALCOHOLS: CPT

## 2020-05-04 PROCEDURE — 85610 PROTHROMBIN TIME: CPT

## 2020-05-04 PROCEDURE — 36415 COLL VENOUS BLD VENIPUNCTURE: CPT

## 2020-05-04 PROCEDURE — 82140 ASSAY OF AMMONIA: CPT

## 2020-05-04 PROCEDURE — G0480 DRUG TEST DEF 1-7 CLASSES: HCPCS

## 2020-05-04 PROCEDURE — 93005 ELECTROCARDIOGRAM TRACING: CPT

## 2020-05-04 PROCEDURE — 84443 ASSAY THYROID STIM HORMONE: CPT

## 2020-05-04 PROCEDURE — 80307 DRUG TEST PRSMV CHEM ANLYZR: CPT

## 2020-05-04 PROCEDURE — 71045 X-RAY EXAM CHEST 1 VIEW: CPT

## 2020-05-04 PROCEDURE — 96365 THER/PROPH/DIAG IV INF INIT: CPT

## 2020-05-04 PROCEDURE — 99285 EMERGENCY DEPT VISIT HI MDM: CPT

## 2020-05-04 PROCEDURE — 70450 CT HEAD/BRAIN W/O DYE: CPT

## 2020-05-04 PROCEDURE — 96366 THER/PROPH/DIAG IV INF ADDON: CPT

## 2020-05-04 PROCEDURE — 85025 COMPLETE CBC W/AUTO DIFF WBC: CPT

## 2020-05-04 PROCEDURE — 80053 COMPREHEN METABOLIC PANEL: CPT

## 2020-05-04 PROCEDURE — 84484 ASSAY OF TROPONIN QUANT: CPT

## 2020-05-04 NOTE — XRAY REPORT
CHEST 1 VIEW 



INDICATION: 

MAIN: AMS; Per EMS patient has had altered mental status x 1 week. Per EMS patient has high BP and sp
ouse gave patient Lisinipril, patients BP was low upon EMS arrival. Patient has slow responses and re
ports dizziness. .



COMPARISON: 

5/13/2018



FINDINGS:

Support devices: None.



Heart: Within normal limits. 

Lungs/Pleura: No acute air space or interstitial disease. 



Additional findings: None.



IMPRESSION:

1. No acute findings.



Signer Name: Chris Barnes MD 

Signed: 5/4/2020 5:36 PM

Workstation Name: [id number]

## 2020-09-25 NOTE — EMERGENCY DEPARTMENT REPORT
HPI





- General


Time Seen by Provider: 05/04/20 16:59





- HPI


HPI: 





59-year-old male presents to the emergency department via EMS from home with the

complaint of a syncopal episode and some altered mental status.  The patient 

apparently had some elevated blood pressure found at home so he was given a 

lisinopril by his significant other.  EMS found the patient to have low blood 

pressure.  He has a past medical history of nonischemic cardiomyopathy with an 

EF of 15 to 20%, previous seizures not on antiepileptic medication, history of 

V. tach with a defibrillator in place, and previously was evaluated for a GI 

bleed and found to have gastritis and duodenitis.  Currently the patient just 

complains of feeling dizzy.  No recent travel or sick contacts at home.  No 

known exposure to anyone with Covid 19.





ED Past Medical Hx





- Past Medical History


Hx Hypertension: Yes (nonischemic cardiomyopathy, EF 15-20%)


Hx Congestive Heart Failure: Yes


Hx Diabetes: No


Hx Renal Disease: No


Hx Arthritis: Yes


Hx Seizures: Yes (last months ago per patient, not on meds)


Hx Asthma: Yes


Hx COPD: No


Additional medical history: VTACH, Defibrillator, Head Trauma, Concussions





- Surgical History


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


Additional Surgical History: JAW, KNEE, HAND





- Social History


Smoking Status: Former Smoker





- Medications


Home Medications: 


                                Home Medications











 Medication  Instructions  Recorded  Confirmed  Last Taken  Type


 


Metoprolol Xl [Metoprolol 100 mg PO BID 12/22/18 12/04/19 12/21/18 History





SUCCINATE ER TAB]     


 


Acetaminophen [Acetaminophen TAB] 2 tab PO Q4H PRN #15 tablet 11/07/19 12/04/19 

Unknown Rx


 


Amiodarone [Cordarone 200 MG TAB] 200 mg PO BID #60 tablet 11/07/19 12/04/19 

Unknown Rx


 


HYDROcodone/APAP 5-325 [Norco 1 each PO Q4H PRN #20 tablet 11/07/19 12/04/19 

Unknown Rx





5-325 mg TAB]     


 


Isosorb Dinit/Hydralazine [Bidil 1 each PO Q12HR  tablet 11/07/19 12/04/19 

Unknown Rx





20/37.5MG]     


 


NIFEdipine [Nifedipine ER] 60 mg PO DAILY #30 tab 11/07/19 12/04/19 12/21/18 Rx


 


Pantoprazole [Protonix TAB] 40 mg PO BID #60 tablet 11/07/19 12/04/19 Unknown Rx


 


Thiamine [Vitamin B-1] 100 mg PO QDAY #30 tab 11/07/19 12/04/19 Unknown Rx


 


traZODone [Desyrel] 50 mg PO QHS #30 tab 11/07/19 12/04/19 12/21/18 Rx


 


Gabapentin 200 mg PO Q8HR #180 capsule 12/07/19  Unknown Rx


 


bisacodyL [Dulcolax] 10 mg PO DAILY #30 tab 12/07/19  Unknown Rx














ED Review of Systems


ROS: 


Stated complaint: LOC


Other details as noted in HPI





Comment: All other systems reviewed and negative


Constitutional: denies: chills, fever


Eyes: denies: eye pain, vision change


ENT: denies: ear pain, throat pain


Respiratory: denies: cough, shortness of breath


Cardiovascular: syncope.  denies: chest pain


Gastrointestinal: denies: abdominal pain, vomiting


Genitourinary: denies: dysuria, discharge


Musculoskeletal: denies: back pain, arthralgia


Skin: denies: rash


Neurological: confusion, other (dizziness).  denies: weakness, numbness





Physical Exam





- Physical Exam


Physical Exam: 





GENERAL: The patient is well-developed well-nourished.


HENT: Normocephalic.  Atraumatic.    Patient has moist mucous membranes.


EYES: Extraocular motions are intact.  No nystagmus.


NECK: Supple. Trachea is midline.


CHEST/LUNGS: Clear to auscultation.  There is no respiratory distress noted.


HEART/CARDIOVASCULAR: Regular.  There is no tachycardia.  There is no murmur.


ABDOMEN: Abdomen is soft, nontender.  Patient has normal bowel sounds. 


SKIN: Skin is warm and dry. 


NEURO: The patient is awake, alert, and cooperative.  The patient has no focal 

neurologic deficits.  Normal speech.  Cranial nerves II through XII grossly 

intact.  Patient is oriented to person, place and time but is sometimes slow to 

answer question.


MUSCULOSKELETAL: There is no tenderness or deformity.  There is no limitation 

range of motion.  There is no evidence of acute injury.





ED Medical Decision Making





- Lab Data


Result diagrams: 


                                 05/04/20 17:11





                                 05/04/20 17:11





- EKG Data


-: EKG Interpreted by Me


EKG shows normal: sinus rhythm, axis (Left axis deviation), intervals 

(Nonspecific IVCD, LVH), QRS complexes (Q waves to the inferior leads), ST-T 

waves


Rate: normal





- EKG Data


When compared to previous EKG there are: no significant change


Interpretation: unchanged when compared t (12/10/19)





- Radiology Data


Radiology results: report reviewed, image reviewed


interpreted by me: 





Chest x-ray does not show any acute process.  There are no pleural effusions, 

obvious pneumonia and there is no pneumothorax.





CT HEAD WITHOUT CONTRAST INDICATION / CLINICAL INFORMATION: Syncope, AMS. 

TECHNIQUE: All CT scans at this location are performed using CT dose reduction 

for ALARA by means of automated exposure control. COMPARISON: Head CT December 4, 2019 and December 22, 2018. FINDINGS: HEMORRHAGE: No evidence of intracranial

 hemorrhage or extra-axial fluid collection. EXTRA-AXIAL SPACES: Cortical sulci,

 sylvian fissures and basilar cisterns are mildly enlarged reflecting a degree 

of parenchymal volume loss which is somewhat greater than expected for the 

patient's age of 59 years.. VENTRICULAR SYSTEM: The ventricular system is of 

normal size and configuration. CEREBRAL PARENCHYMA: No areas of abnormal brain 

parenchymal attenuation are identified. There is no indication of recent 

infarction. MIDLINE SHIFT OR HERNIATION: There is no mass effect. CEREBELLUM / 

BRAINSTEM: Brainstem and cerebellum have an unremarkable appearance. 

INTRACRANIAL VESSELS:No abnormalities are identified on this noncontrast head 

CT. ORBITS: visualized portions of the orbits have an unremarkable appearance. 

SOFT TISSUES of HEAD: No significant abnormality. CALVARIUM: Evaluation of bone 

windows reveals no abnormalities. PARANASAL SINUSES / MASTOID AIR CELLS: 

Paranasal sinuses are free from inflammatory mucosal disease. Mastoid air cells 

are normally pneumatized. IMPRESSION: 1. Mild parenchymal volume loss. 2. No 

acute intracranial abnormality. No significant interval change. 





- Medical Decision Making


This patient presents to the emergency department after he had some type of a 

syncopal episode and concern for altered mental status.  Overall the patient is 

actually oriented to person, place and time but is slow to answer some of the 

questions.  Initially had a CT scan of the head without contrast that does not 

show any bleed, shift, mass, ischemia, or any other acute process.  Patient's 

labs came back showing a blood alcohol level of 0.32 which is most likely the 

reason for the patient's clinical status.  The rest of the patient's labs have 

been unremarkable.  His vital signs have been stable throughout his ED course.  

He was given some IV fluid resuscitation with a banana bag.  The patient has 

been here for about 5 hours and has remained stable.  The patient's mental 

status has continued to improve and he is now very quick to answer any 

questions.  His blood alcohol level is probably close to 0.22 and the patient is

 asking for discharge home.  He called his significant other, Opal, who has 

come to the emergency department to take responsibility for him and get him home

 safely.  I discussed with the patient that he should avoid any further alcohol 

intoxication or abuse and follow-up with primary care.  He will return to the ER

 with any worsening of his symptoms or any acute distress.





Critical Care Time: No


Critical care attestation.: 


If time is entered above; I have spent that time in minutes in the direct care 

of this critically ill patient, excluding procedure time.








ED Disposition


Clinical Impression: 


 Alcohol abuse





Alcohol intoxication


Qualifiers:


 Complication of substance-induced condition: uncomplicated Qualified Code(s): 

F10.920 - Alcohol use, unspecified with intoxication, uncomplicated





Syncope


Qualifiers:


 Syncope type: unspecified Qualified Code(s): R55 - Syncope and collapse





Disposition: DC-01 TO HOME OR SELFCARE


Is pt being admited?: No


Condition: Stable


Instructions:  Syncope (ED), Alcohol Intoxication (ED), Abuse of Alcohol (ED)


Additional Instructions: 


Please follow-up with your primary care physician in the next few days.  Please 

avoid any further alcohol intoxication or abuse.  Return to the emergency 

department with any further episodes of passing out, worsening of your symptoms,

 or with any acute distress.


Referrals: 


PRIMARY CARE,MD [Primary Care Provider] - 2-3 Days


Time of Disposition: 21:31 O-Z Flap Text: The defect edges were debeveled with a #15 scalpel blade.  Given the location of the defect, shape of the defect and the proximity to free margins an O-Z flap was deemed most appropriate.  Using a sterile surgical marker, an appropriate transposition flap was drawn incorporating the defect and placing the expected incisions within the relaxed skin tension lines where possible. The area thus outlined was incised deep to adipose tissue with a #15 scalpel blade.  The skin margins were undermined to an appropriate distance in all directions utilizing iris scissors.

## 2020-11-23 NOTE — CAT SCAN REPORT
** PLEASE SIGN, DATE AND TIME CERTIFICATION BELOW AND RETURN TO [facility] OUTPATIENT REHABILITATION (FAX #: [phone]). ATTEST/CO-SIGN IF ACCESSING [employer]IA [employer] THANK YOU.**    I certify that I have examined the patient below and determined that Physical Medicine and Rehabilitation service is necessary and that I approve the established plan of care for up to 90 days or as specifically noted. Attestation, signature or co-signature of physician indicates approval of certification requirements.    ________________________ ____________ __________  Physician Signature   Date   Time    [facility]  PHYSICAL THERAPY  [] DAILY NOTE [] PROGRESS NOTE [x] DISCHARGE NOTE    [x] [facility]     Date: 2020  Patient Name:  Chelly Peng  : 1960  MRN: [id number]       Referring Practitioner Anatoly Duque MD with Dr. Alfonso Nunez   Diagnosis Malignant neoplasm of upper-outer quadrant of right female breast [C50.411]    Treatment Diagnosis R shoulder tightness, Z71.9   Date of Evaluation 20    Additional Pertinent History 20 R IDC ER/CA+, HER2-, 20 R Lumpectomy with SLNB with 0/1 node +, 20 re-excision; thyroid disease       Functional Outcome Measure Used O: QuickDASH   Functional Outcome Score    11.4% eval (20)   4.6% 20       Insurance: Primary: Payor: Benedicto Welsh /  /  / ,   Secondary:    Authorization Information: Modalities ok, no aquatics or massage   Visit # 7, 7/10 for progress note   Visits Allowed: Unlimited   Recertification Date:    Physician Follow-Up: Radiation thru 20 at [address] [place] [address]: States she is healing well from finishing radiation. Denies any fatigue at this point and admits no longer has any needle sticks in axilla. Admits after last session, she was sore for 3 days and then symptoms abolished. No further cording sensations.     TREATMENT   Precautions:    Pain: 0/10 CT head without contrast



HISTORY: Lightheadedness/Dizziness.



TECHNIQUE:  Axial imaging performed from the skull apex through the skull base without the use of con
trast.  All CT scans at this location are performed using CT dose reduction for ALARA by means of aut
omated exposure control. 



COMPARISON:  CT head from 12/22/2018



FINDINGS:  



Parenchyma:  No acute intracranial hemorrhage or parenchymal abnormality.

Ventricles:  There is mild diffuse brain atrophy with commensurate ventricular enlargement which is l
ikely age appropriate.  

Soft tissues:  Soft tissues including the orbits appear normal.   

Bones:  No acute osseous abnormality.   

Sinuses:  Sinuses and mastoid air cells are clear.





IMPRESSION: No acute abnormality.



Signer Name: Chris Barnes MD 

Signed: 12/4/2019 5:17 PM

 Workstation Name: [id number] lumpectomy site    X in shaded column indicates activity completed today   Modalities Parameters/  Location  Notes         Manual Therapy Time/Technique  Notes   PORi protocol to R upper quadrant for MLD and MFR 45 minutes  LymphaTouch 115 mmHg 60 Hz at R axilla to incision   Exercise/Intervention   Notes   Retro shoulder rolls 1x10      Scapular retractions 1x10      Wall walks 1x10      Pec stretches in doorway 3x15 seconds   Hands cheek height and with elbows extended   Bye-Bye's  3   Flexion and abduction          Lymphedema education with reduction strategies and symptoms to monitor for reviewed   x    Home stretches in doorway and Bye-Bye's reviewed   x                           Upper Extremity Circumferential Measurements     Right (cm) Left (cm) Comments   Met Heads 18.4 18.2     Ulnar Styloid Process 14.9 14.5     10 cm distal to Lateral Epicondyle 22.7 23     Elbow Crease 23.9 24.2     10 cm proximal from Lateral Epicondyle 29.5 28.7     Axilla 31.2 31.4     Total 140.6 140                                          11/6/20:    142          139.1        9/10/20:    140          138. 6        8/17/20:                      142          140.4                                     8/7/20:                         140. 2       138.5    Specific Interventions for Next Treatment:  N/A  Activity Tolerance: Patient tolerated treatment well. ASSESSMENT:  Assessment: Pt with cording abolished, improved shoulder mobility and decreased girth this date. No evidence of lymphedema and radiation treatment site examined with excellent healing noted. No edema palpated at breast and no cording observed or palpated. Moderate tightness at lats, subscap and scar tissue from lumpectomy however thorough review of HEP and lymphedema concerns for safety after discharge. GOALS:  Patient Goal: Get back to normal quickly, avoid lymphedema     Short Term Goals to be met in 12 weeks:  1.  See LTGs      Long Term Goals to be met in 12 weeks: 1. Pt to demo R shoulder PROM flexion improved from 166 deg to to 174 deg for improved ease with reaching overhead. GOAL MET:  175 deg. Discontinue Goal    2. PREVIOUS GOAL MET, NO NEW GOAL. 3. Pt to demo R shoulder PROM abduction returned to 180 deg after surgery for return to yoga. GOAL MET:  180 deg. Discontinue Goal    4. PREVIOUS GOAL MET, NO NEW GOAL. 5. Pt to demo R UE lymphedema measures controlled at <144 cm with independent lymphedema risk reduction strategies for safety after discharge. GOAL MET:  140.6 cm. Discontinue Goal    Patient Education: Progress towards goals, recommendation of discharge and to continue with HEP.   Education Outcome: Verbalized understanding  Education Barriers: None    PLAN: Discharge    Time In 0935   Time Out 1020   Timed Code Minutes: 45 min   Total Treatment Time: 45 min       Electronically Signed by: Ariella aNrayan PT, DPT, [facility] [id number] 11/23/2020

## 2020-12-01 ENCOUNTER — HOSPITAL ENCOUNTER (OUTPATIENT)
Dept: HOSPITAL 5 - ED | Age: 60
Setting detail: OBSERVATION
LOS: 2 days | Discharge: HOME | End: 2020-12-03
Attending: INTERNAL MEDICINE | Admitting: INTERNAL MEDICINE
Payer: MEDICARE

## 2020-12-01 DIAGNOSIS — Z95.810: ICD-10-CM

## 2020-12-01 DIAGNOSIS — F32.9: ICD-10-CM

## 2020-12-01 DIAGNOSIS — I50.9: ICD-10-CM

## 2020-12-01 DIAGNOSIS — E87.2: ICD-10-CM

## 2020-12-01 DIAGNOSIS — R56.9: Primary | ICD-10-CM

## 2020-12-01 DIAGNOSIS — I48.92: ICD-10-CM

## 2020-12-01 DIAGNOSIS — I48.91: ICD-10-CM

## 2020-12-01 DIAGNOSIS — K44.9: ICD-10-CM

## 2020-12-01 DIAGNOSIS — N17.9: ICD-10-CM

## 2020-12-01 DIAGNOSIS — E83.42: ICD-10-CM

## 2020-12-01 DIAGNOSIS — K21.9: ICD-10-CM

## 2020-12-01 DIAGNOSIS — I11.0: ICD-10-CM

## 2020-12-01 DIAGNOSIS — R51.9: ICD-10-CM

## 2020-12-01 DIAGNOSIS — I42.0: ICD-10-CM

## 2020-12-01 DIAGNOSIS — Z87.898: ICD-10-CM

## 2020-12-01 DIAGNOSIS — M54.2: ICD-10-CM

## 2020-12-01 DIAGNOSIS — M19.90: ICD-10-CM

## 2020-12-01 DIAGNOSIS — G62.9: ICD-10-CM

## 2020-12-01 DIAGNOSIS — G89.29: ICD-10-CM

## 2020-12-01 LAB
ALBUMIN SERPL-MCNC: 4.5 G/DL (ref 3.9–5)
ALT SERPL-CCNC: 26 UNITS/L (ref 7–56)
ANISOCYTOSIS BLD QL SMEAR: (no result)
BAND NEUTROPHILS # (MANUAL): 0 K/MM3
BILIRUB UR QL STRIP: (no result)
BLOOD UR QL VISUAL: (no result)
BUN SERPL-MCNC: 13 MG/DL (ref 9–20)
BUN/CREAT SERPL: 9 %
CALCIUM SERPL-MCNC: 9.8 MG/DL (ref 8.4–10.2)
HCT VFR BLD CALC: 25.3 % (ref 35.5–45.6)
HCT VFR BLD CALC: 28.8 % (ref 35.5–45.6)
HEMOLYSIS INDEX: 16
HGB BLD-MCNC: 8.8 GM/DL (ref 11.8–15.2)
HGB BLD-MCNC: 9.4 GM/DL (ref 11.8–15.2)
HYALINE CASTS #/AREA URNS LPF: 23 /LPF
MCHC RBC AUTO-ENTMCNC: 33 % (ref 32–34)
MCHC RBC AUTO-ENTMCNC: 35 % (ref 32–34)
MCV RBC AUTO: 89 FL (ref 84–94)
MCV RBC AUTO: 94 FL (ref 84–94)
MUCOUS THREADS #/AREA URNS HPF: (no result) /HPF
MYELOCYTES # (MANUAL): 0 K/MM3
PH UR STRIP: 5 [PH] (ref 5–7)
PLATELET # BLD: 237 K/MM3 (ref 140–440)
PLATELET # BLD: 331 K/MM3 (ref 140–440)
PROMYELOCYTES # (MANUAL): 0 K/MM3
RBC # BLD AUTO: 2.85 M/MM3 (ref 3.65–5.03)
RBC # BLD AUTO: 3.08 M/MM3 (ref 3.65–5.03)
RBC #/AREA URNS HPF: 2 /HPF (ref 0–6)
TOTAL CELLS COUNTED BLD: 100
UROBILINOGEN UR-MCNC: < 2 MG/DL (ref ?–2)
WBC #/AREA URNS HPF: 1 /HPF (ref 0–6)

## 2020-12-01 PROCEDURE — 81001 URINALYSIS AUTO W/SCOPE: CPT

## 2020-12-01 PROCEDURE — 93005 ELECTROCARDIOGRAM TRACING: CPT

## 2020-12-01 PROCEDURE — 70450 CT HEAD/BRAIN W/O DYE: CPT

## 2020-12-01 PROCEDURE — 97530 THERAPEUTIC ACTIVITIES: CPT

## 2020-12-01 PROCEDURE — 93880 EXTRACRANIAL BILAT STUDY: CPT

## 2020-12-01 PROCEDURE — 96366 THER/PROPH/DIAG IV INF ADDON: CPT

## 2020-12-01 PROCEDURE — 72125 CT NECK SPINE W/O DYE: CPT

## 2020-12-01 PROCEDURE — 85007 BL SMEAR W/DIFF WBC COUNT: CPT

## 2020-12-01 PROCEDURE — 80320 DRUG SCREEN QUANTALCOHOLS: CPT

## 2020-12-01 PROCEDURE — 96375 TX/PRO/DX INJ NEW DRUG ADDON: CPT

## 2020-12-01 PROCEDURE — 97161 PT EVAL LOW COMPLEX 20 MIN: CPT

## 2020-12-01 PROCEDURE — 71045 X-RAY EXAM CHEST 1 VIEW: CPT

## 2020-12-01 PROCEDURE — 82962 GLUCOSE BLOOD TEST: CPT

## 2020-12-01 PROCEDURE — 82550 ASSAY OF CK (CPK): CPT

## 2020-12-01 PROCEDURE — 74176 CT ABD & PELVIS W/O CONTRAST: CPT

## 2020-12-01 PROCEDURE — 97116 GAIT TRAINING THERAPY: CPT

## 2020-12-01 PROCEDURE — 96367 TX/PROPH/DG ADDL SEQ IV INF: CPT

## 2020-12-01 PROCEDURE — 73560 X-RAY EXAM OF KNEE 1 OR 2: CPT

## 2020-12-01 PROCEDURE — 80053 COMPREHEN METABOLIC PANEL: CPT

## 2020-12-01 PROCEDURE — 84443 ASSAY THYROID STIM HORMONE: CPT

## 2020-12-01 PROCEDURE — G0378 HOSPITAL OBSERVATION PER HR: HCPCS

## 2020-12-01 PROCEDURE — 96365 THER/PROPH/DIAG IV INF INIT: CPT

## 2020-12-01 PROCEDURE — 83735 ASSAY OF MAGNESIUM: CPT

## 2020-12-01 PROCEDURE — 36415 COLL VENOUS BLD VENIPUNCTURE: CPT

## 2020-12-01 PROCEDURE — 73600 X-RAY EXAM OF ANKLE: CPT

## 2020-12-01 PROCEDURE — G0480 DRUG TEST DEF 1-7 CLASSES: HCPCS

## 2020-12-01 PROCEDURE — 85025 COMPLETE CBC W/AUTO DIFF WBC: CPT

## 2020-12-01 PROCEDURE — 99285 EMERGENCY DEPT VISIT HI MDM: CPT

## 2020-12-01 PROCEDURE — 85027 COMPLETE CBC AUTOMATED: CPT

## 2020-12-01 PROCEDURE — 80048 BASIC METABOLIC PNL TOTAL CA: CPT

## 2020-12-01 RX ADMIN — APIXABAN SCH MG: 5 TABLET, FILM COATED ORAL at 09:56

## 2020-12-01 RX ADMIN — PANTOPRAZOLE SODIUM SCH MG: 40 TABLET, DELAYED RELEASE ORAL at 10:34

## 2020-12-01 RX ADMIN — LEVETIRACETAM SCH MG: 500 SOLUTION ORAL at 09:56

## 2020-12-01 RX ADMIN — SPIRONOLACTONE SCH MG: 25 TABLET ORAL at 10:34

## 2020-12-01 RX ADMIN — OXYCODONE AND ACETAMINOPHEN PRN TAB: 5; 325 TABLET ORAL at 22:37

## 2020-12-01 RX ADMIN — NIFEDIPINE SCH MG: 60 TABLET, EXTENDED RELEASE ORAL at 10:34

## 2020-12-01 RX ADMIN — Medication SCH ML: at 21:18

## 2020-12-01 RX ADMIN — PANTOPRAZOLE SODIUM SCH MG: 40 TABLET, DELAYED RELEASE ORAL at 17:56

## 2020-12-01 RX ADMIN — METOPROLOL SUCCINATE SCH MG: 100 TABLET, EXTENDED RELEASE ORAL at 09:56

## 2020-12-01 RX ADMIN — GABAPENTIN SCH MG: 300 CAPSULE ORAL at 09:56

## 2020-12-01 RX ADMIN — Medication SCH ML: at 09:57

## 2020-12-01 RX ADMIN — LEVETIRACETAM SCH MG: 500 SOLUTION ORAL at 21:16

## 2020-12-01 NOTE — CAT SCAN REPORT
CT head without contrast



HISTORY: Seizure.



TECHNIQUE:  Axial imaging performed from the skull apex through the skull base without the use of con
trast.  All CT scans at this location are performed using CT dose reduction for ALARA by means of aut
omated exposure control. 



COMPARISON:  CT head from 9/13/2020



FINDINGS:  



Parenchyma:  No acute intracranial hemorrhage or parenchymal abnormality.

Ventricles:  There is mild diffuse brain atrophy with commensurate ventricular enlargement which is l
ikely age appropriate.  

Soft tissues:  Soft tissues including the orbits appear normal.   

Bones:  No acute osseous abnormality.   

Sinuses:  Sinuses and mastoid air cells are clear.





IMPRESSION: No acute abnormality.



Signer Name: Chris Barnes MD 

Signed: 12/1/2020 4:34 AM

Workstation Name: [id number]

## 2020-12-01 NOTE — CONSULTATION
History of Present Illness


Consult date: 12/01/20


Reason for Consult: Syncope


History of present illness: 





History of present illness: 


This is a 60-year-old male with arthritis, nonischemic cardiomyopathy, frequent 

falls, chronic back pain, hypertension, seizures, orthostatic hypotension, 

hypertension, GERD, depression, atrial fibrillation s/p ICD in situ, V. tach, as

thma and anemia emergency department on 12/1 after being found down (supine at 

home for an unknown duration of time the EMS.  Patient denies any chest pain, 

shortness of breath, cough, hemoptysis, fevers, chills, nausea, vomiting, 

diarrhea, fatigue, recent sick contacts or known COVID-19 exposure.  Of note the

patient was recently discharged in September after a syncopal episode, anemia.  

Work-up in the emergency department included a CTh which showed mild diffuse 

brain atrophy with commensurate ventricular enlargement inline with age, CT C 

spine showed moderately advanced DJD and abnormal spinal alignment without any 

acute abnormality, cxr with no acute findings, CT abd/pelvis which showed 

degenerative changes within the spine and pelvis, moderate-sized hiatal hernia, 

and mild colonic diverticulosis. Lab work revealed metabolic acidosis (CO2 15), 

acute kidney injury with CR 1.5 (baseline 0.8-1), and hypomagnesemia at 1.5.  In

the emergency department he received 2mg of magnesium and 1 g of Keppra.  The 

time my examination his heart rate increased into the 140s after being awakened 

by the RN with his blood tremors in his upper extremities therefore he was given

0.5 mg of Ativan and 2.5 mg of Lopressor was ordered however was not 

administered because his HR decreased to low 100s after the Ativan.  


The above is history reviewed There is no tongue bite reported  .  Th patient is

under sedation at present . There is injury on the face





Past History


Past Medical History: atrial fib, anemia, arthritis, GERD, heart failure, 

hypertension, seizures, other (Depression, peripheral neuropathy)


Past Surgical History: Other (Hand, jaw, knee surgery, AICD placement)


Social history: , lives with family, full code.  denies: smoking, 

prescription drug abuse, IV drug use


Family history: hypertension





Medications and Allergies


                                    Allergies











Allergy/AdvReac Type Severity Reaction Status Date / Time


 


No Known Allergies Allergy   Verified 12/01/20 07:33











                                Home Medications











 Medication  Instructions  Recorded  Confirmed  Last Taken  Type


 


Pantoprazole [Protonix TAB] 40 mg PO BID #60 tablet 11/07/19 12/01/20 Unknown Rx


 


levETIRAcetam [Keppra TAB] 500 mg PO BID #60 tablet 09/10/20 12/01/20 Unknown Rx


 


DULoxetine [Cymbalta] 30 mg PO DAILY 09/12/20 12/01/20 Unknown History


 


Metoprolol Xl [Metoprolol 100 mg PO QDAY  tablet 09/15/20 12/01/20 Unknown Rx





SUCCINATE ER TAB]     


 


amLODIPine 10 mg PO QDAY #30 tablet 09/21/20 12/01/20 Unknown Rx


 


Apixaban [Eliquis] 5 mg PO Q48HR 12/01/20 12/01/20 Unknown History


 


Gabapentin 300 mg PO DAILY 12/01/20 12/01/20 Unknown History


 


NIFEdipine [Nifedipine ER] 60 mg PO Q24HR 12/01/20 12/01/20 Unknown History


 


Ondansetron [Zofran Odt] 4 mg PO Q8HR 12/01/20 12/01/20 Unknown History


 


Spironolactone [Aldactone] 25 mg PO QDAY 12/01/20 12/01/20 Unknown History


 


lisinopriL [Zestril TAB] 10 mg PO QDAY 12/01/20 12/01/20 Unknown History











Active Meds: 


Active Medications





Acetaminophen (Tylenol)  650 mg PO Q4H PRN


   PRN Reason: Pain MILD(1-3)/Fever >100.5/HA


Amlodipine Besylate (Amlodipine)  10 mg PO QDAY [facility]


   Last Admin: 12/01/20 09:56 Dose:  10 mg


   Documented by: 


Apixaban (Eliquis)  5 mg PO Q48HR [facility]; Protocol


   Last Admin: 12/01/20 09:56 Dose:  5 mg


   Documented by: 


Duloxetine HCl (Cymbalta)  30 mg PO DAILY [facility]


   Last Admin: 12/01/20 09:57 Dose:  30 mg


   Documented by: 


Gabapentin (Gabapentin)  300 mg PO DAILY [facility]


   Last Admin: 12/01/20 09:56 Dose:  300 mg


   Documented by: 


Hydralazine HCl (Apresoline)  10 mg IV Q4HR PRN


   PRN Reason: Hypertension


Sodium Chloride (Nacl 0.45% 1000 Ml)  1,000 mls @ 75 mls/hr IV AS DIRECT JORDANA


   Stop: 12/02/20 01:19


Levetiracetam (Keppra)  750 mg PO BID [facility]


   Last Admin: 12/01/20 09:56 Dose:  750 mg


   Documented by: 


Lorazepam (Ativan)  1 mg IV Q4H PRN


   PRN Reason: Seizures


Metoprolol Succinate (Metoprolol Xl)  100 mg PO QDAY [facility]


   Last Admin: 12/01/20 09:56 Dose:  100 mg


   Documented by: 


Nifedipine (Procardia Xl)  60 mg PO Q24HR [facility]


   Last Admin: 12/01/20 10:34 Dose:  60 mg


   Documented by: 


Ondansetron HCl (Zofran)  4 mg IV Q6HR PRN


   PRN Reason: Nausea And Vomiting


Oxycodone/Acetaminophen (Percocet 5/325)  1 tab PO Q6H PRN


   PRN Reason: Pain, Moderate (4-6)


Pantoprazole Sodium (Protonix)  40 mg PO BIDAC [facility]


   Last Admin: 12/01/20 10:34 Dose:  40 mg


   Documented by: 


Sodium Chloride (Sodium Chloride Flush Syringe 10 Ml)  10 ml IV BID [facility]


   Last Admin: 12/01/20 09:57 Dose:  10 ml


   Documented by: 


Sodium Chloride (Sodium Chloride Flush Syringe 10 Ml)  10 ml IV PRN PRN


   PRN Reason: LINE FLUSH


Spironolactone (Aldactone)  25 mg PO QDAY [facility]


   Last Admin: 12/01/20 10:34 Dose:  25 mg


   Documented by: 











Review of Systems


ROS unobtainable: due to mental status





Physical Examination





- Vital Signs


Vital Signs: 


                                   Vital Signs











Temp Pulse Resp BP Pulse Ox


 


 98.0 F   122 H  16   148/85   97 


 


 12/01/20 03:20  12/01/20 03:20  12/01/20 03:20  12/01/20 03:20  12/01/20 03:20














- Additional Exam


Additional Exam: 





The patient is drowsy, the patient is responding to commands , is able to move 

all 4 extremities , there is injury noted on face .





Results





- Laboratory Findings


CBC and BMP: 


                                 12/01/20 08:24





                                 12/01/20 03:21


Abnormal Lab Findings: 


                                  Abnormal Labs











  12/01/20 12/01/20 12/01/20





  03:21 03:21 03:21


 


RBC  3.08 L  


 


Hgb  9.4 L  


 


Hct  28.8 L  


 


MCHC   


 


RDW  19.2 H  


 


Seg Neuts % (Manual)  85.0 H  


 


Lymphocytes % (Manual)  7.0 L  


 


Monocytes % (Manual)  8.0 H  


 


Lymphocytes # (Manual)  0.6 L  


 


Carbon Dioxide   15 L 


 


Creatinine   1.5 H 


 


Glucose   161 H 


 


Magnesium   1.50 L 


 


AST   78 H 


 


Total Creatine Kinase   285 H 


 


Salicylates    < 0.3 L


 


Acetaminophen   














  12/01/20 12/01/20





  03:21 08:24


 


RBC   2.85 L


 


Hgb   8.8 L


 


Hct   25.3 L


 


MCHC   35 H


 


RDW   18.7 H


 


Seg Neuts % (Manual)  


 


Lymphocytes % (Manual)  


 


Monocytes % (Manual)  


 


Lymphocytes # (Manual)  


 


Carbon Dioxide  


 


Creatinine  


 


Glucose  


 


Magnesium  


 


AST  


 


Total Creatine Kinase  


 


Salicylates  


 


Acetaminophen  5.0 L 














Assessment and Plan





Impression /Plan:





1. Encephalopathy ? secondary to Seizures / Medications .


2. No change in the medications are recommended .


3. No Driving for 6 months .


4. Since patient has a ICD MRI can be done by calling [facility]. .


5. Out patient EEG 


6. I will call family and discuss out patient follow up 


7. If the patient persist being drowsy repeat CT Brain in am 


8. Have Neurology follow up once in Hospital 


9. I have called patients family ,patients out patient neurologist is Dr. Osmin Gomes .  Family concerns are recurrant falls, seizures, i check the medical 

records from [facility] on 10/29 at [facility] Patient had a MRI Brain / Cervical 

and Thoracic Spine . 


10. Patient needs a very close out patient neurology follow up including sending

patient on Higher Dose of Keppra and Ambulatory EEG and again letting the family

know to call Dr. Gomes if not they can follow up with me .

















Dr. Gene KAUFMAN ( [phone] ).





- Patient Problems


(1) Syncope


Current Visit: No   Status: Acute   


Qualifiers: 


   Syncope type: unspecified   Qualified Code(s): R55 - Syncope and collapse

## 2020-12-01 NOTE — HISTORY AND PHYSICAL REPORT
History of Present Illness


Chief complaint: 





"I blacked out and hit my head)


History of present illness: 


This is a 60-year-old male with arthritis, nonischemic cardiomyopathy, frequent 

falls, chronic back pain, hypertension, seizures, orthostatic hypotension, 

hypertension, GERD, depression, atrial fibrillation s/p ICD in situ, V. tach, 

asthma and anemia emergency department on 12/1 after being found down (supine at

home for an unknown duration of time the EMS.  Patient denies any chest pain, 

shortness of breath, cough, hemoptysis, fevers, chills, nausea, vomiting, 

diarrhea, fatigue, recent sick contacts or known COVID-19 exposure.  Of note the

patient was recently discharged in September after a syncopal episode, anemia.  

Work-up in the emergency department included a CTh which showed mild diffuse 

brain atrophy with commensurate ventricular enlargement inline with age, CT C 

spine showed moderately advanced DJD and abnormal spinal alignment wihtout any 

acute abnormality, cxr with no acute findings, CT abd/pelvis which showed 

degenerative changes within the spine and pelvis, moderate-sized hiatal hernia, 

and mild colonic diverticulosis. Lab work revealed metabolic acidosis (CO2 15), 

acute kidney injury with CR 1.5 (baseline 0.8-1), and hypomagnesemia at 1.5.  In

the emergency department he received 2mg of magnesium and 1 g of Keppra.  The 

time my examination his heart rate increased into the 140s after being awakened 

by the RN with his blood tremors in his upper extremities therefore he was given

0.5 mg of Ativan and 2.5 mg of Lopressor was ordered however was not 

administered because his HR decreased to low 100s after the Ativan.  Neurology 

has been consulted and he will be admitted for further work-up.  Prior visits 

reviewed and medications reconciled.  Advanced care planning conducted in the 

emergency department.





Past History


Past Medical History: atrial fib, anemia, arthritis, GERD, heart failure, 

hypertension, seizures, other (Depression, peripheral neuropathy)


Past Surgical History: Other (Hand, jaw, knee surgery, AICD placement)


Social history: , lives with family, full code.  denies: smoking, 

prescription drug abuse, IV drug use


Family history: hypertension





Medications and Allergies


                                    Allergies











Allergy/AdvReac Type Severity Reaction Status Date / Time


 


No Known Allergies Allergy   Verified 12/01/20 07:33











                                Home Medications











 Medication  Instructions  Recorded  Confirmed  Last Taken  Type


 


Pantoprazole [Protonix TAB] 40 mg PO BID #60 tablet 11/07/19 12/01/20 Unknown Rx


 


levETIRAcetam [Keppra TAB] 500 mg PO BID #60 tablet 09/10/20 12/01/20 Unknown Rx


 


DULoxetine [Cymbalta] 30 mg PO DAILY 09/12/20 12/01/20 Unknown History


 


Metoprolol Xl [Metoprolol 100 mg PO QDAY  tablet 09/15/20 12/01/20 Unknown Rx





SUCCINATE ER TAB]     


 


amLODIPine 10 mg PO QDAY #30 tablet 09/21/20 12/01/20 Unknown Rx


 


Apixaban [Eliquis] 5 mg PO Q48HR 12/01/20 12/01/20 Unknown History


 


Gabapentin 300 mg PO DAILY 12/01/20 12/01/20 Unknown History


 


NIFEdipine [Nifedipine ER] 60 mg PO Q24HR 12/01/20 12/01/20 Unknown History


 


Ondansetron [Zofran Odt] 4 mg PO Q8HR 12/01/20 12/01/20 Unknown History


 


Spironolactone [Aldactone] 25 mg PO QDAY 12/01/20 12/01/20 Unknown History


 


lisinopriL [Zestril TAB] 10 mg PO QDAY 12/01/20 12/01/20 Unknown History











Active Meds: 


Active Medications





Acetaminophen (Tylenol)  650 mg PO Q4H PRN


   PRN Reason: Pain MILD(1-3)/Fever >100.5/HA


Amlodipine Besylate (Amlodipine)  10 mg PO QDAY JORDANA


Apixaban (Eliquis)  5 mg PO Q48HR JORDANA; Protocol


Duloxetine HCl (Cymbalta)  30 mg PO DAILY JORDANA


Gabapentin (Gabapentin)  300 mg PO DAILY JORDANA


Levetiracetam (Keppra)  750 mg PO BID JORDANA


Ondansetron HCl (Zofran)  4 mg IV Q6HR PRN


   PRN Reason: Nausea And Vomiting


Oxycodone/Acetaminophen (Percocet 5/325)  1 tab PO Q6H PRN


   PRN Reason: Pain, Moderate (4-6)


Pantoprazole Sodium (Protonix)  40 mg PO BIDAC JORDANA


Sodium Chloride (Sodium Chloride Flush Syringe 10 Ml)  10 ml IV BID JORDANA


Sodium Chloride (Sodium Chloride Flush Syringe 10 Ml)  10 ml IV PRN PRN


   PRN Reason: LINE FLUSH











Review of Systems


Constitutional: no weight loss, no weight gain, no fever, no chills, no sweats, 

no night sweats, no anorexia, no fatigue, no weakness


Ears, nose, mouth and throat: no ear pain, no ear discharge, no tinnitis, no 

decreased hearing, no nose pain, no nasal congestion, no nasal discharge, no 

sinus pressure, no sinus pain, no epistaxis, no bleeding gums, no dental pain, 

no dysphagia, no hoarseness, no sore throat, no voice changes


Cardiovascular: rapid/irregular heart beat, syncope, lightheadedness, high blood

pressure, no chest pain, no orthopnea, no palpitations, no edema, no shortness 

of breath, no dyspnea on exertion, no leg edema


Respiratory: no cough, no cough with sputum, no excessive sputum, no hemoptysis,

no shortness of breath, no dyspnea on exertion, no pleurisy, no pain on 

inspiration, no home oxygen


Gastrointestinal: no abdominal pain, no nausea, no vomiting, no diarrhea, no 

constipation, no change in bowel habits, no hematemesis, no coffee ground emesis


Genitourinary Male: no hematuria, no flank pain, no discharge, no urinary 

frequency, no urinary hesitancy, no nocturia, no incontinence


Rectal: no pain, no incontinence, no bleeding


Musculoskeletal: arm numbness/tingling, low back pain, frequent falls, no neck s

tiffness, no neck pain, no shooting arm pain, no shooting leg pain, no leg 

numbness/tingling, no redness of joints, no muscle weakness, no limitation of 

motion


Integumentary: no rash, no pruritis, no redness, no sores, no wounds


Neurological: weakness, numbness, tingling, seizures, syncope, memory loss, no 

head injury, no transient paralysis, no paralysis, no changes in smell/taste, no

gait dysfunction


Psychiatric: memory loss, depression, no anxiety, no insomnia, no hypersomnia, 

no disorientation, no hallucinations, no paranoia


Endocrine: palpatations, no cold intolerance, no heat intolerance, no 

polyphagia, no excessive thirst, no polydipsia, no polyuria, no nocturia, no 

weight change


Hematologic/Lymphatic: no easy bruising, no easy bleeding


Allergic/Immunologic: no urticaria, no allergic rhinitis, no wheezing





Exam





- Constitutional


Vitals: 


                                        











Temp Pulse Resp BP Pulse Ox


 


 98.0 F   101 H  18   129/78   96 


 


 12/01/20 03:20  12/01/20 07:15  12/01/20 07:15  12/01/20 07:15  12/01/20 07:15











General appearance: Present: no acute distress





- EENT


Eyes: Present: PERRL, EOM intact


ENT: hearing intact, other (Dentures)





- Neck


Neck: Present: supple, normal ROM





- Respiratory


Respiratory effort: normal


Respiratory: bilateral: CTA





- Cardiovascular


Rhythm: regular


Heart Sounds: Present: S1 & S2.  Absent: systolic murmur, diastolic murmur





- Extremities


Extremities: no ischemia, pulses intact, pulses symmetrical, No edema, normal 

temperature, normal color, Full ROM


Peripheral Pulses: within normal limits





- Abdominal


General gastrointestinal: Present: soft, non-tender, non-distended, normal bowel

sounds





- Integumentary


Integumentary: Present: clear, warm, dry





- Musculoskeletal


Musculoskeletal: strength equal bilaterally





- Psychiatric


Psychiatric: cooperative





- Neurologic


Neurologic: no CNII-XII intact, no focal deficits, moves all extremities





- Allied Health


Allied health notes reviewed: nursing





HEART Score





- HEART Score


History: Moderately suspicious


EKG: Normal


Age: 45-65


Risk factors: 1-2 risk factors


Troponin: 


                                        











WBC  8.8 K/mm3 (4.5-11.0)   12/01/20  03:21    


 


RBC  3.08 M/mm3 (3.65-5.03)  L  12/01/20  03:21    


 


Hgb  9.4 gm/dl (11.8-15.2)  L  12/01/20  03:21    


 


Hct  28.8 % (35.5-45.6)  L  12/01/20  03:21    


 


MCV  94 fl (84-94)   12/01/20  03:21    


 


MCH  31 pg (28-32)   12/01/20  03:21    


 


MCHC  33 % (32-34)   12/01/20  03:21    


 


RDW  19.2 % (13.2-15.2)  H  12/01/20  03:21    


 


Plt Count  331 K/mm3 (140-440)   12/01/20  03:21    


 


Add Manual Diff  Complete   12/01/20  03:21    


 


Total Counted  100   12/01/20  03:21    


 


Seg Neuts % (Manual)  85.0 % (40.0-70.0)  H  12/01/20  03:21    


 


Band Neutrophils %  0 %  12/01/20  03:21    


 


Lymphocytes % (Manual)  7.0 % (13.4-35.0)  L  12/01/20  03:21    


 


Reactive Lymphs % (Man)  0 %  12/01/20  03:21    


 


Monocytes % (Manual)  8.0 % (0.0-7.3)  H  12/01/20  03:21    


 


Eosinophils % (Manual)  0 % (0.0-4.3)   12/01/20  03:21    


 


Basophils % (Manual)  0 % (0.0-1.8)   12/01/20  03:21    


 


Metamyelocytes %  0 %  12/01/20  03:21    


 


Myelocytes %  0 %  12/01/20  03:21    


 


Promyelocytes %  0 %  12/01/20  03:21    


 


Blast Cells %  0 %  12/01/20  03:21    


 


Nucleated RBC %  Not Reportable   12/01/20  03:21    


 


Seg Neutrophils # Man  7.5 K/mm3 (1.8-7.7)   12/01/20  03:21    


 


Band Neutrophils #  0.0 K/mm3  12/01/20  03:21    


 


Lymphocytes # (Manual)  0.6 K/mm3 (1.2-5.4)  L  12/01/20  03:21    


 


Abs React Lymphs (Man)  0.0 K/mm3  12/01/20  03:21    


 


Monocytes # (Manual)  0.7 K/mm3 (0.0-0.8)   12/01/20  03:21    


 


Eosinophils # (Manual)  0.0 K/mm3 (0.0-0.4)   12/01/20  03:21    


 


Basophils # (Manual)  0.0 K/mm3 (0.0-0.1)   12/01/20  03:21    


 


Metamyelocytes #  0.0 K/mm3  12/01/20  03:21    


 


Myelocytes #  0.0 K/mm3  12/01/20  03:21    


 


Promyelocytes #  0.0 K/mm3  12/01/20  03:21    


 


Blast Cells #  0.0 K/mm3  12/01/20  03:21    


 


WBC Morphology  Not Reportable   12/01/20  03:21    


 


Hypersegmented Neuts  Not Reportable   12/01/20  03:21    


 


Hyposegmented Neuts  Not Reportable   12/01/20  03:21    


 


Hypogranular Neuts  Not Reportable   12/01/20  03:21    


 


Smudge Cells  Not Reportable   12/01/20  03:21    


 


Toxic Granulation  Not Reportable   12/01/20  03:21    


 


Toxic Vacuolation  Not Reportable   12/01/20  03:21    


 


Dohle Bodies  Not Reportable   12/01/20  03:21    


 


Pelger-Huet Anomaly  Not Reportable   12/01/20  03:21    


 


Peyton Rods  Not Reportable   12/01/20  03:21    


 


Platelet Estimate  Consistent w auto   12/01/20  03:21    


 


Clumped Platelets  Not Reportable   12/01/20  03:21    


 


Plt Clumps, EDTA  Not Reportable   12/01/20  03:21    


 


Large Platelets  Not Reportable   12/01/20  03:21    


 


Giant Platelets  Not Reportable   12/01/20  03:21    


 


Platelet Satelliting  Not Reportable   12/01/20  03:21    


 


Plt Morphology Comment  Not Reportable   12/01/20  03:21    


 


RBC Morphology  Not Reportable   12/01/20  03:21    


 


Dimorphic RBCs  Not Reportable   12/01/20  03:21    


 


Polychromasia  Not Reportable   12/01/20  03:21    


 


Hypochromasia  Few   12/01/20  03:21    


 


Poikilocytosis  Not Reportable   12/01/20  03:21    


 


Anisocytosis  1+   12/01/20  03:21    


 


Microcytosis  Few   12/01/20  03:21    


 


Macrocytosis  Not Reportable   12/01/20  03:21    


 


Spherocytes  Not Reportable   12/01/20  03:21    


 


Pappenheimer Bodies  Not Reportable   12/01/20  03:21    


 


Sickle Cells  Not Reportable   12/01/20  03:21    


 


Target Cells  Few   12/01/20  03:21    


 


Tear Drop Cells  Not Reportable   12/01/20  03:21    


 


Ovalocytes  Few   12/01/20  03:21    


 


Helmet Cells  Not Reportable   12/01/20  03:21    


 


Aguirre-Pettus Bodies  Not Reportable   12/01/20  03:21    


 


Cabot Rings  Not Reportable   12/01/20  03:21    


 


Danette Cells  Not Reportable   12/01/20  03:21    


 


Bite Cells  Not Reportable   12/01/20  03:21    


 


Crenated Cell  Not Reportable   12/01/20  03:21    


 


Elliptocytes  Not Reportable   12/01/20  03:21    


 


Acanthocytes (Spur)  Not Reportable   12/01/20  03:21    


 


Rouleaux  Not Reportable   12/01/20  03:21    


 


Hemoglobin C Crystals  Not Reportable   12/01/20  03:21    


 


Schistocytes  Not Reportable   12/01/20  03:21    


 


Malaria parasites  Not Reportable   12/01/20  03:21    


 


George Bodies  Not Reportable   12/01/20  03:21    


 


Hem Pathologist Commnt  No   12/01/20  03:21    


 


Sodium  144 mmol/L (137-145)   12/01/20  03:21    


 


Potassium  4.1 mmol/L (3.6-5.0)   12/01/20  03:21    


 


Chloride  101.1 mmol/L ()   12/01/20  03:21    


 


Carbon Dioxide  15 mmol/L (22-30)  L  12/01/20  03:21    


 


Anion Gap  32 mmol/L  12/01/20  03:21    


 


BUN  13 mg/dL (9-20)   12/01/20  03:21    


 


Creatinine  1.5 mg/dL (0.8-1.3)  H  12/01/20  03:21    


 


Estimated GFR  57 ml/min  12/01/20  03:21    


 


BUN/Creatinine Ratio  9 %  12/01/20  03:21    


 


Glucose  161 mg/dL ()  H  12/01/20  03:21    


 


Calcium  9.8 mg/dL (8.4-10.2)   12/01/20  03:21    


 


Magnesium  1.50 mg/dL (1.7-2.3)  L  12/01/20  03:21    


 


Total Bilirubin  0.80 mg/dL (0.1-1.2)   12/01/20  03:21    


 


AST  78 units/L (5-40)  H  12/01/20  03:21    


 


ALT  26 units/L (7-56)   12/01/20  03:21    


 


Alkaline Phosphatase  107 units/L ()   12/01/20  03:21    


 


Total Creatine Kinase  285 units/L ()  H  12/01/20  03:21    


 


Total Protein  7.6 g/dL (6.3-8.2)   12/01/20  03:21    


 


Albumin  4.5 g/dL (3.9-5)   12/01/20  03:21    


 


Albumin/Globulin Ratio  1.5 %  12/01/20  03:21    


 


TSH  2.750 mlU/mL (0.270-4.200)   12/01/20  03:21    


 


Urine Color  Yellow  (Yellow)   12/01/20  04:49    


 


Urine Turbidity  Clear  (Clear)   12/01/20  04:49    


 


Urine pH  5.0  (5.0-7.0)   12/01/20  04:49    


 


Ur Specific Gravity  1.018  (1.003-1.030)   12/01/20  04:49    


 


Urine Protein  100 mg/dl mg/dL (Negative)   12/01/20  04:49    


 


Urine Glucose (UA)  50 mg/dL (Negative)   12/01/20  04:49    


 


Urine Ketones  Neg mg/dL (Negative)   12/01/20  04:49    


 


Urine Blood  Sm  (Negative)   12/01/20  04:49    


 


Urine Nitrite  Neg  (Negative)   12/01/20  04:49    


 


Urine Bilirubin  Neg  (Negative)   12/01/20  04:49    


 


Urine Urobilinogen  < 2.0 mg/dL (<2.0)   12/01/20  04:49    


 


Ur Leukocyte Esterase  Neg  (Negative)   12/01/20  04:49    


 


Urine WBC (Auto)  1.0 /HPF (0.0-6.0)   12/01/20  04:49    


 


Urine RBC (Auto)  2.0 /HPF (0.0-6.0)   12/01/20  04:49    


 


U Epithel Cells (Auto)  < 1.0 /HPF (0-13.0)   12/01/20  04:49    


 


Hyaline Casts  23 /LPF  12/01/20  04:49    


 


Urine Mucus  Few /HPF  12/01/20  04:49    


 


Salicylates  < 0.3 mg/dL (2.8-20.0)  L  12/01/20  03:21    


 


Acetaminophen  5.0 ug/mL (10.0-30.0)  L  12/01/20  03:21    


 


Plasma/Serum Alcohol  < 0.01 % (0-0.07)   12/01/20  03:21    

















Troponin: < normal limit


HEART Score: 3





Results





- Labs


CBC & Chem 7: 


                                 12/01/20 08:24





                                 12/01/20 03:21


Labs: 


                             Laboratory Last Values











WBC  8.8 K/mm3 (4.5-11.0)   12/01/20  03:21    


 


RBC  3.08 M/mm3 (3.65-5.03)  L  12/01/20  03:21    


 


Hgb  9.4 gm/dl (11.8-15.2)  L  12/01/20  03:21    


 


Hct  28.8 % (35.5-45.6)  L  12/01/20  03:21    


 


MCV  94 fl (84-94)   12/01/20  03:21    


 


MCH  31 pg (28-32)   12/01/20  03:21    


 


MCHC  33 % (32-34)   12/01/20  03:21    


 


RDW  19.2 % (13.2-15.2)  H  12/01/20  03:21    


 


Plt Count  331 K/mm3 (140-440)   12/01/20  03:21    


 


Add Manual Diff  Complete   12/01/20  03:21    


 


Total Counted  100   12/01/20  03:21    


 


Seg Neuts % (Manual)  85.0 % (40.0-70.0)  H  12/01/20  03:21    


 


Band Neutrophils %  0 %  12/01/20  03:21    


 


Lymphocytes % (Manual)  7.0 % (13.4-35.0)  L  12/01/20  03:21    


 


Reactive Lymphs % (Man)  0 %  12/01/20  03:21    


 


Monocytes % (Manual)  8.0 % (0.0-7.3)  H  12/01/20  03:21    


 


Eosinophils % (Manual)  0 % (0.0-4.3)   12/01/20  03:21    


 


Basophils % (Manual)  0 % (0.0-1.8)   12/01/20  03:21    


 


Metamyelocytes %  0 %  12/01/20  03:21    


 


Myelocytes %  0 %  12/01/20  03:21    


 


Promyelocytes %  0 %  12/01/20  03:21    


 


Blast Cells %  0 %  12/01/20  03:21    


 


Nucleated RBC %  Not Reportable   12/01/20  03:21    


 


Seg Neutrophils # Man  7.5 K/mm3 (1.8-7.7)   12/01/20  03:21    


 


Band Neutrophils #  0.0 K/mm3  12/01/20  03:21    


 


Lymphocytes # (Manual)  0.6 K/mm3 (1.2-5.4)  L  12/01/20  03:21    


 


Abs React Lymphs (Man)  0.0 K/mm3  12/01/20  03:21    


 


Monocytes # (Manual)  0.7 K/mm3 (0.0-0.8)   12/01/20  03:21    


 


Eosinophils # (Manual)  0.0 K/mm3 (0.0-0.4)   12/01/20  03:21    


 


Basophils # (Manual)  0.0 K/mm3 (0.0-0.1)   12/01/20  03:21    


 


Metamyelocytes #  0.0 K/mm3  12/01/20  03:21    


 


Myelocytes #  0.0 K/mm3  12/01/20  03:21    


 


Promyelocytes #  0.0 K/mm3  12/01/20  03:21    


 


Blast Cells #  0.0 K/mm3  12/01/20  03:21    


 


WBC Morphology  Not Reportable   12/01/20  03:21    


 


Hypersegmented Neuts  Not Reportable   12/01/20  03:21    


 


Hyposegmented Neuts  Not Reportable   12/01/20  03:21    


 


Hypogranular Neuts  Not Reportable   12/01/20  03:21    


 


Smudge Cells  Not Reportable   12/01/20  03:21    


 


Toxic Granulation  Not Reportable   12/01/20  03:21    


 


Toxic Vacuolation  Not Reportable   12/01/20  03:21    


 


Dohle Bodies  Not Reportable   12/01/20  03:21    


 


Pelger-Huet Anomaly  Not Reportable   12/01/20  03:21    


 


Peyton Rods  Not Reportable   12/01/20  03:21    


 


Platelet Estimate  Consistent w auto   12/01/20  03:21    


 


Clumped Platelets  Not Reportable   12/01/20  03:21    


 


Plt Clumps, EDTA  Not Reportable   12/01/20  03:21    


 


Large Platelets  Not Reportable   12/01/20  03:21    


 


Giant Platelets  Not Reportable   12/01/20  03:21    


 


Platelet Satelliting  Not Reportable   12/01/20  03:21    


 


Plt Morphology Comment  Not Reportable   12/01/20  03:21    


 


RBC Morphology  Not Reportable   12/01/20  03:21    


 


Dimorphic RBCs  Not Reportable   12/01/20  03:21    


 


Polychromasia  Not Reportable   12/01/20  03:21    


 


Hypochromasia  Few   12/01/20  03:21    


 


Poikilocytosis  Not Reportable   12/01/20  03:21    


 


Anisocytosis  1+   12/01/20  03:21    


 


Microcytosis  Few   12/01/20  03:21    


 


Macrocytosis  Not Reportable   12/01/20  03:21    


 


Spherocytes  Not Reportable   12/01/20  03:21    


 


Pappenheimer Bodies  Not Reportable   12/01/20  03:21    


 


Sickle Cells  Not Reportable   12/01/20  03:21    


 


Target Cells  Few   12/01/20  03:21    


 


Tear Drop Cells  Not Reportable   12/01/20  03:21    


 


Ovalocytes  Few   12/01/20  03:21    


 


Helmet Cells  Not Reportable   12/01/20  03:21    


 


Aguirre-Pettus Bodies  Not Reportable   12/01/20  03:21    


 


Cabot Rings  Not Reportable   12/01/20  03:21    


 


Danette Cells  Not Reportable   12/01/20  03:21    


 


Bite Cells  Not Reportable   12/01/20  03:21    


 


Crenated Cell  Not Reportable   12/01/20  03:21    


 


Elliptocytes  Not Reportable   12/01/20  03:21    


 


Acanthocytes (Spur)  Not Reportable   12/01/20  03:21    


 


Rouleaux  Not Reportable   12/01/20  03:21    


 


Hemoglobin C Crystals  Not Reportable   12/01/20  03:21    


 


Schistocytes  Not Reportable   12/01/20  03:21    


 


Malaria parasites  Not Reportable   12/01/20  03:21    


 


George Bodies  Not Reportable   12/01/20  03:21    


 


Hem Pathologist Commnt  No   12/01/20  03:21    


 


Sodium  144 mmol/L (137-145)   12/01/20  03:21    


 


Potassium  4.1 mmol/L (3.6-5.0)   12/01/20  03:21    


 


Chloride  101.1 mmol/L ()   12/01/20  03:21    


 


Carbon Dioxide  15 mmol/L (22-30)  L  12/01/20  03:21    


 


Anion Gap  32 mmol/L  12/01/20  03:21    


 


BUN  13 mg/dL (9-20)   12/01/20  03:21    


 


Creatinine  1.5 mg/dL (0.8-1.3)  H  12/01/20  03:21    


 


Estimated GFR  57 ml/min  12/01/20  03:21    


 


BUN/Creatinine Ratio  9 %  12/01/20  03:21    


 


Glucose  161 mg/dL ()  H  12/01/20  03:21    


 


Calcium  9.8 mg/dL (8.4-10.2)   12/01/20  03:21    


 


Magnesium  1.50 mg/dL (1.7-2.3)  L  12/01/20  03:21    


 


Total Bilirubin  0.80 mg/dL (0.1-1.2)   12/01/20  03:21    


 


AST  78 units/L (5-40)  H  12/01/20  03:21    


 


ALT  26 units/L (7-56)   12/01/20  03:21    


 


Alkaline Phosphatase  107 units/L ()   12/01/20  03:21    


 


Total Creatine Kinase  285 units/L ()  H  12/01/20  03:21    


 


Total Protein  7.6 g/dL (6.3-8.2)   12/01/20  03:21    


 


Albumin  4.5 g/dL (3.9-5)   12/01/20  03:21    


 


Albumin/Globulin Ratio  1.5 %  12/01/20  03:21    


 


TSH  2.750 mlU/mL (0.270-4.200)   12/01/20  03:21    


 


Urine Color  Yellow  (Yellow)   12/01/20  04:49    


 


Urine Turbidity  Clear  (Clear)   12/01/20  04:49    


 


Urine pH  5.0  (5.0-7.0)   12/01/20  04:49    


 


Ur Specific Gravity  1.018  (1.003-1.030)   12/01/20  04:49    


 


Urine Protein  100 mg/dl mg/dL (Negative)   12/01/20  04:49    


 


Urine Glucose (UA)  50 mg/dL (Negative)   12/01/20  04:49    


 


Urine Ketones  Neg mg/dL (Negative)   12/01/20  04:49    


 


Urine Blood  Sm  (Negative)   12/01/20  04:49    


 


Urine Nitrite  Neg  (Negative)   12/01/20  04:49    


 


Urine Bilirubin  Neg  (Negative)   12/01/20  04:49    


 


Urine Urobilinogen  < 2.0 mg/dL (<2.0)   12/01/20  04:49    


 


Ur Leukocyte Esterase  Neg  (Negative)   12/01/20  04:49    


 


Urine WBC (Auto)  1.0 /HPF (0.0-6.0)   12/01/20  04:49    


 


Urine RBC (Auto)  2.0 /HPF (0.0-6.0)   12/01/20  04:49    


 


U Epithel Cells (Auto)  < 1.0 /HPF (0-13.0)   12/01/20  04:49    


 


Hyaline Casts  23 /LPF  12/01/20  04:49    


 


Urine Mucus  Few /HPF  12/01/20  04:49    


 


Salicylates  < 0.3 mg/dL (2.8-20.0)  L  12/01/20  03:21    


 


Acetaminophen  5.0 ug/mL (10.0-30.0)  L  12/01/20  03:21    


 


Plasma/Serum Alcohol  < 0.01 % (0-0.07)   12/01/20  03:21    














- Imaging and Cardiology


Chest x-ray: report reviewed


CT scan - abdomen: report reviewed


CT Scan - head: report reviewed





- Diagnostic Impressions


Diagnostic Impressions: 


12/1 CT abd/pelvis: There are degenerative changes within the spine and pelvis 

with no acute osseous abnormality. There is a moderate-sized hiatal hernia. 

Urinary bladder and prostate are unremarkable with no pelvic free fluid. There 

is mild colonic diverticulosis with no acute inflammatory change.


12/1 CTH:  mild diffuse brain atrophy with commensurate ventricular enlargement 

which is likely age appropriate. 


12/1 CXR: no acute abnormality. 


12/1 CT cspine: 2 mm retrolisthesis of C4 on C5 and C6 on C7. Mild kyphosis 

centered at C3/4. Bones: There is no acute osseous abnormality. Moderate 

multilevel discogenic DJD and facet arthropathy with partial ankylosis at the 

posterior elements of C2/3. 


[place]/IV: 


                                        





IV Catheter Type [Left Wrist]    INT / Saline Lock











Assessment and Plan


VTE prophylaxis?: Chemical, Mechanical


Plan of care discussed with patient/family: Yes





- Patient Problems


(1) Seizure


Current Visit: Yes   Status: Acute   


Plan to address problem: 


Patient has a history of seizures and reports compliance with home Keppra 500 

mg twice daily


Presented with possible seizure causing ground-level fall


Received 1 g Keppra IV in the ED


Neurology consulted


Home p.o. Keppra dose increased to 750 twice daily


Seizure precautions


As needed Ativan for seizures


12/1 CT head shows no acute abnormalities


12/1 EEG pending








(2) HERMELINDA (acute kidney injury)


Current Visit: No   Status: Acute   


Plan to address problem: 


Presented with a creatinine of 1.5/BUN 13


Reviewed prior visits patient's baseline creatinine seems to be 0.8-1


S/p 250 mL bolus of normal saline in the ED


Renally dose medications


Avoid nephrotoxic medications


Strict intake and output


Trend BMP


1/2 NS at 75 for 1 L


Hold home lisinopril for now








(3) Hypomagnesemia


Current Visit: Yes   Status: Acute   


Plan to address problem: 


Presented with magnesium of 1.5


Received 2 mg of magnesium in the ED


Trend magnesium and replete as needed








(4) Dilated cardiomyopathy


Current Visit: No   Status: Chronic   


Plan to address problem: 


2017 LHC: normal coronaries, EF 25-30%.


12/2019 echocardiogram: EF 50-55%


9/2020 echocardiogram: Normal LVEF 60-65%


Restarted home cardioprotective medications


Strict intake and output


Daily weights


Cardiac diet








(5) Hypertension


Current Visit: No   Status: Chronic   


Plan to address problem: 


Restarted home antihypertensive regimen


Blood pressure monitoring per protocol


As needed hydralazine for SBP greater than 160








(6) Atrial fibrillation and flutter


Current Visit: Yes   Status: Chronic   


Plan to address problem: 


Restarted home beta-blocker and anticoagulation


Remote telemetry


Supportive care








(7) GERD (gastroesophageal reflux disease)


Current Visit: Yes   Status: Chronic   


Plan to address problem: 


Restarted home PPI


Supportive care








(8) Depression


Current Visit: Yes   Status: Chronic   


Plan to address problem: 


Restarted home antidepressant


Supportive care


Currently denies any SI/HI








(9) Neuropathy


Current Visit: Yes   Status: Chronic   


Plan to address problem: 


Restarted home medications


supportive care








(10) DVT prophylaxis


Current Visit: No   Status: Acute   


Plan to address problem: 


SCDs to bilateral lower extremities while in bed


Heparin subcu








(11) Full code status


Current Visit: Yes   Status: Acute

## 2020-12-01 NOTE — XRAY REPORT
CHEST 1 VIEW 



INDICATION: 

cough weak.



COMPARISON: 

9/12/2020



FINDINGS:



SUPPORT DEVICES: None.



HEART: Within normal limits. 



LUNGS/PLEURA: No acute air space or interstitial disease. 



ADDITIONAL FINDINGS: None.



IMPRESSION:

1. No acute findings.

 



Signer Name: Chris Barnes MD 

Signed: 12/1/2020 3:52 AM

Workstation Name: [id number]

## 2020-12-01 NOTE — CAT SCAN REPORT
CT cervical spine spine without contrast



INDICATION:  Seizure vs Syncope, found on floor, confused.



TECHNIQUE:  Axial imaging performed through the cervical spine without the use of contrast.  Sagittal
 and coronal reconstructed images were also reviewed.  All CT scans at this location are performed us
ing CT dose reduction for ALARA by means of automated exposure control. 



COMPARISON:  CT cervical spine from 12/4/2019



FINDINGS: 



Alignment:  Stepwise 2 mm retrolisthesis of C4 on C5 and C6 on C7. Mild kyphosis centered at C3/4.  

Bones:  There is no acute osseous abnormality.  Moderate multilevel discogenic DJD and facet arthropa
thy with partial ankylosis at the posterior elements of C2/3.

Soft tissues:  No acute or significant incidental soft tissue abnormality.



IMPRESSION:

1. No acute abnormality.

2. Moderately advanced DJD and abnormal spinal alignment as above.



Signer Name: Chris Barnes MD 

Signed: 12/1/2020 4:36 AM

Workstation Name: [id number]

## 2020-12-01 NOTE — EMERGENCY DEPARTMENT REPORT
ED General Adult HPI





- General


Chief complaint: Seizure


Stated complaint: SEIZURES


PUI?: No


Time Seen by Provider: 20 02:43


Source: patient, EMS ( EMS documentation not available at time of chart 

dictation ), RN notes reviewed, old records reviewed


Mode of arrival: Stretcher


Limitations: Altered Mental Status, Physical Limitation





- History of Present Illness


Initial comments: 





The patient was evaluated in the emergency department for symptoms described in 

the history of present illness.  He/she was evaluated in the context of the 

global COVID-19 pandemic, which necessitated consideration that the patient 

might be at risk for infection with the virus that causes COVID-19.  

Institutional protocols and algorithms that pertain to the evaluation of 

patients at risk for COVID-19 are in a state of rapid change based on i

nformation released by regulatory bodies including the CDC and federal and state

organizations.  These policies and algorithms were followed during the patient's

care in the emergency department.  Please note that these policies, procedures 

and recommendations changed on a rapid basis.








Patient is a 60-year-old gentleman.  Past medical history is complex.  Has a 

past medical history of orthostatic hypotension, seizure, atrial fib/flutter, 

ICD device in situ, previously on amiodarone and metoprolol, not on systemic 

anticoagulation secondary to anemia, history of nonischemic cardiomyopathy, EF 

25 to 30%, history of seizure, currently on Keppra.





Patient presents to the ER today via EMS.  Most of the history is obtained from 

nursing team, who obtained collateral information from EMS.  Apparently, patient

was found down, supine at home, for uncertain duration of time, and via 

uncertain mechanism.  Patient not accompanied by friends or family at this time 

for collateral information.  The patient indicates he does not know what 

happened.  He complains of mild diffuse abdominal cramping, cough, nausea, 

headache, not sudden or thunderclap in nature, not maximal in intensity, and not

the worst headache of his life.  He denies midline neck pain, chest pain, he has

chronic shortness of breath, denies urinary symptoms, denies fever, loss of 

taste, loss of smell.  Nursing team informed me that the patient reportedly had 

2 convulsive events today.


-: This evening


Consistency: intermittent


Improves with: other


Worsens with: other





- Related Data


                                Home Medications











 Medication  Instructions  Recorded  Confirmed  Last Taken


 


DULoxetine [Cymbalta] 30 mg PO DAILY 20 Unknown


 


Apixaban [Eliquis] 5 mg PO Q48HR 20 Unknown


 


Gabapentin 300 mg PO DAILY 20 Unknown


 


NIFEdipine [Nifedipine ER] 60 mg PO Q24HR 20 Unknown


 


Ondansetron [Zofran Odt] 4 mg PO Q8HR 20 Unknown


 


Spironolactone [Aldactone] 25 mg PO QDAY 20 Unknown


 


lisinopriL [Zestril TAB] 10 mg PO QDAY 20 Unknown








                                  Previous Rx's











 Medication  Instructions  Recorded  Last Taken  Type


 


Pantoprazole [Protonix TAB] 40 mg PO BID #60 tablet 19 Unknown Rx


 


levETIRAcetam [Keppra TAB] 500 mg PO BID #60 tablet 09/10/20 Unknown Rx


 


Metoprolol Xl [Metoprolol 100 mg PO QDAY  tablet 09/15/20 Unknown Rx





SUCCINATE ER TAB]    


 


amLODIPine 10 mg PO QDAY #30 tablet 20 Unknown Rx











                                    Allergies











Allergy/AdvReac Type Severity Reaction Status Date / Time


 


No Known Allergies Allergy   Verified 20 07:33














ED Review of Systems


ROS: 


Stated complaint: SEIZURES


Other details as noted in HPI





Constitutional: malaise, weakness.  denies: fever


Eyes: denies: eye discharge


Respiratory: cough


Cardiovascular: syncope.  denies: chest pain


Gastrointestinal: abdominal pain, nausea


Genitourinary: denies: dysuria


Musculoskeletal: myalgia


Neurological: weakness, confusion


Hematological/Lymphatic: denies: easy bleeding





ED Past Medical Hx





- Past Medical History


Previous Medical History?: Yes


Hx Hypertension: Yes (nonischemic cardiomyopathy, EF 15-20%)


Hx Congestive Heart Failure: Yes


Hx Diabetes: No


Hx Deep Vein Thrombosis:  (unknown)


Hx Renal Disease: No


Hx Arthritis: Yes


Hx Seizures: Yes (last months ago per patient, not on meds)


Hx Asthma: Yes


Hx COPD: No


Additional medical history: VTACH, Defibrillator, Head Trauma, Concussions





- Surgical History


Past Surgical History?: Yes


Hx Pacemaker: Yes


Hx Internal Defibrillator: Yes


Additional Surgical History: JAW, KNEE, HAND





- Social History


Smoking Status: Never Smoker





- Medications


Home Medications: 


                                Home Medications











 Medication  Instructions  Recorded  Confirmed  Last Taken  Type


 


Pantoprazole [Protonix TAB] 40 mg PO BID #60 tablet 19 Unknown Rx


 


levETIRAcetam [Keppra TAB] 500 mg PO BID #60 tablet 09/10/20 12/01/20 Unknown Rx


 


DULoxetine [Cymbalta] 30 mg PO DAILY 20 Unknown History


 


Metoprolol Xl [Metoprolol 100 mg PO QDAY  tablet 09/15/20 12/01/20 Unknown Rx





SUCCINATE ER TAB]     


 


amLODIPine 10 mg PO QDAY #30 tablet 20 Unknown Rx


 


Apixaban [Eliquis] 5 mg PO Q48HR 20 Unknown History


 


Gabapentin 300 mg PO DAILY 20 Unknown History


 


NIFEdipine [Nifedipine ER] 60 mg PO Q24HR 20 Unknown History


 


Ondansetron [Zofran Odt] 4 mg PO Q8HR 20 Unknown History


 


Spironolactone [Aldactone] 25 mg PO QDAY 20 Unknown History


 


lisinopriL [Zestril TAB] 10 mg PO QDAY 20 Unknown History














ED Physical Exam





- General


Limitations: Altered Mental Status, Physical Limitation


General appearance: anxious, in distress, obese





- Head


Head exam: Present: atraumatic, normocephalic





- Eye


Eye exam: Present: normal appearance, EOMI, other (Visual acuity intact to 

finger counting, color perception, reading at a close distance).  Absent: 

nystagmus





- ENT


ENT exam: Present: normal exam, normal orophraynx, mucous membranes moist, 

normal external ear exam





- Neck


Neck exam: Present: normal inspection, full ROM.  Absent: tenderness, 

meningismus





- Respiratory


Respiratory exam: Present: normal lung sounds bilaterally.  Absent: respiratory 

distress, wheezes, rales, rhonchi, stridor, accessory muscle use, decreased 

breath sounds





- Cardiovascular


Cardiovascular Exam: Present: normal rhythm, tachycardia, normal heart sounds.  

Absent: bradycardia, irregular rhythm, systolic murmur, diastolic murmur, rubs, 

gallop





- GI/Abdominal


GI/Abdominal exam: Present: soft, tenderness, normal bowel sounds, other (There 

is left-sided abdominal tenderness).  Absent: distended, guarding, rebound, 

rigid, pulsatile mass





- Rectal


Rectal exam: Present: deferred





- Extremities Exam


Extremities exam: Present: normal inspection, full ROM, other (2+ pulses noted 

in the bilateral upper and lower extremities.  There is no palpable cord.   

negative Homans sign.  Muscular compartments are soft.  The pelvis is stable.). 

Absent: pedal edema, calf tenderness





- Back Exam


Back exam: Present: normal inspection





- Neurological Exam


Neurological exam: Present: alert, oriented X3, other (No facial droop.  Tongue 

midline.  Extraocular movements intact bilaterally.  Facial sensation intact to 

light touch in V1, V2, V3 distribution bilaterally.  5 and a 5 strength in 4 

extremities.  Sensation intact to light touch in 4 extremities.)





- Psychiatric


Psychiatric exam: Present: anxious





- Skin


Skin exam: Present: warm, dry, intact, normal color.  Absent: rash





ED Course


                                   Vital Signs











  20





  03:20 03:31 03:45


 


Temperature 98.0 F  


 


Pulse Rate 122 H 115 H 112 H


 


Respiratory 16 18 15





Rate   


 


Blood Pressure  148/85 148/85


 


Blood Pressure 148/85  





[Right]   


 


O2 Sat by Pulse 97 98 99





Oximetry   














  20





  04:17 04:30 04:45


 


Temperature   


 


Pulse Rate 107 H 98 H 113 H


 


Respiratory 15 15 21





Rate   


 


Blood Pressure 148/85 145/84 145/84


 


Blood Pressure   





[Right]   


 


O2 Sat by Pulse   





Oximetry   














  20





  05:00 05:15 05:31


 


Temperature   


 


Pulse Rate 98 H 96 H 93 H


 


Respiratory 18 23 17





Rate   


 


Blood Pressure 136/83 136/83 113/83


 


Blood Pressure   





[Right]   


 


O2 Sat by Pulse 96 98 94





Oximetry   














  20





  05:45 06:00 06:15


 


Temperature   


 


Pulse Rate 102 H 100 H 116 H


 


Respiratory 18 17 16





Rate   


 


Blood Pressure 129/88 145/88 145/88


 


Blood Pressure   





[Right]   


 


O2 Sat by Pulse 100 97 96





Oximetry   














  20





  06:30 06:45 07:15


 


Temperature   


 


Pulse Rate 98 H 98 H 101 H


 


Respiratory 15 15 18





Rate   


 


Blood Pressure 141/84 139/84 129/78


 


Blood Pressure   





[Right]   


 


O2 Sat by Pulse 96 97 96





Oximetry   














  20





  08:15 09:00 10:00


 


Temperature   


 


Pulse Rate 96 H 93 H 111 H


 


Respiratory 15 13 15





Rate   


 


Blood Pressure 131/78 138/82 145/91


 


Blood Pressure   





[Right]   


 


O2 Sat by Pulse 94 98 99





Oximetry   














  20





  11:00 12:00 13:01


 


Temperature   


 


Pulse Rate 101 H 89 107 H


 


Respiratory 17 16 13





Rate   


 


Blood Pressure 140/92 135/81 170/100


 


Blood Pressure   





[Right]   


 


O2 Sat by Pulse 98 96 98





Oximetry   














  20





  14:00 15:00 15:11


 


Temperature   


 


Pulse Rate 92 H 95 H 83


 


Respiratory 26 H 16 14





Rate   


 


Blood Pressure 136/91 144/93 144/97


 


Blood Pressure   





[Right]   


 


O2 Sat by Pulse 96 95 95





Oximetry   














- Reevaluation(s)


Reevaluation #1: 





20 03:33


Differential diagnosis, including but not limited to: Seizure, arrhythmia, 

orthostasis, vagal event, structural cardiac disease, pneumonia, urinary tract 

infection, intra-abdominal infection, obstruction, closed head injury, cervical 

spine injury





Assessment and plan: 60-year-old gentleman with complex past medical history, 

presenting with seizure versus syncope, nonspecific headache, and abdominal 

pain, cough, nausea.








Place patient on seizure precautions.  Obtain CT scan of the brain, cervical 

spine, abdomen pelvis.  Obtain x-ray of the chest, urinalysis, EKG, screening 

laboratory studies.  Initiate patient on Keppra therapy IV.





Patient has a [employer] ICD device.





Contacted [employer] representative, Haresh, they will come in and evaluate

/interrogate patient's defibrillator in the morning.





Given clinical ambiguity, patient's confusion, complex past medical history, 

anticipate admission for observation and medical optimization.








Reevaluation #2: 





20 05:21


Tachycardia improving.  Heart rate 101 bpm at this time.  CT scan of the brain, 

cervical spine, abdomen/pelvis negative.  Patient drinking water/eating ice 

chips.  Urinalysis negative for acute disease.  Laboratory studies demonstrate 

renal insufficiency, metabolic acidosis, and hypomagnesemia.





Suspect that patient had a breakthrough seizure.  However, would admit for 

observation.





Care will be transferred to the [facility] ER physician, Dr. Leigh, contact 

hospital physician to arrange admission.





ED Medical Decision Making





- Lab Data


Result diagrams: 


                                 20 08:24





                                 20 03:21








                                   Vital Signs











  20





  03:20


 


Temperature 98.0 F


 


Pulse Rate 122 H


 


Respiratory 16





Rate 


 


Blood Pressure 148/85





[Right] 


 


O2 Sat by Pulse 97





Oximetry 











                                   Lab Results











  20 Range/Units





  03:21 03:21 03:21 


 


WBC  8.8    (4.5-11.0)  K/mm3


 


RBC  3.08 L    (3.65-5.03)  M/mm3


 


Hgb  9.4 L    (11.8-15.2)  gm/dl


 


Hct  28.8 L    (35.5-45.6)  %


 


MCV  94    (84-94)  fl


 


MCH  31    (28-32)  pg


 


MCHC  33    (32-34)  %


 


RDW  19.2 H    (13.2-15.2)  %


 


Plt Count  331    (140-440)  K/mm3


 


Sodium   144   (137-145)  mmol/L


 


Potassium   4.1   (3.6-5.0)  mmol/L


 


Chloride   101.1   ()  mmol/L


 


Carbon Dioxide   15 L   (22-30)  mmol/L


 


Anion Gap   32   mmol/L


 


BUN   13   (9-20)  mg/dL


 


Creatinine   1.5 H   (0.8-1.3)  mg/dL


 


Estimated GFR   57   ml/min


 


BUN/Creatinine Ratio   9   %


 


Glucose   161 H   ()  mg/dL


 


Calcium   9.8   (8.4-10.2)  mg/dL


 


Magnesium   1.50 L   (1.7-2.3)  mg/dL


 


Total Bilirubin   0.80   (0.1-1.2)  mg/dL


 


AST   78 H   (5-40)  units/L


 


ALT   26   (7-56)  units/L


 


Alkaline Phosphatase   107   ()  units/L


 


Total Creatine Kinase   285 H   ()  units/L


 


Total Protein   7.6   (6.3-8.2)  g/dL


 


Albumin   4.5   (3.9-5)  g/dL


 


Albumin/Globulin Ratio   1.5   %


 


Salicylates    < 0.3 L  (2.8-20.0)  mg/dL


 


Acetaminophen     (10.0-30.0)  ug/mL


 


Plasma/Serum Alcohol     (0-0.07)  %














  20 Range/Units





  03:21 03:21 


 


WBC    (4.5-11.0)  K/mm3


 


RBC    (3.65-5.03)  M/mm3


 


Hgb    (11.8-15.2)  gm/dl


 


Hct    (35.5-45.6)  %


 


MCV    (84-94)  fl


 


MCH    (28-32)  pg


 


MCHC    (32-34)  %


 


RDW    (13.2-15.2)  %


 


Plt Count    (140-440)  K/mm3


 


Sodium    (137-145)  mmol/L


 


Potassium    (3.6-5.0)  mmol/L


 


Chloride    ()  mmol/L


 


Carbon Dioxide    (22-30)  mmol/L


 


Anion Gap    mmol/L


 


BUN    (9-20)  mg/dL


 


Creatinine    (0.8-1.3)  mg/dL


 


Estimated GFR    ml/min


 


BUN/Creatinine Ratio    %


 


Glucose    ()  mg/dL


 


Calcium    (8.4-10.2)  mg/dL


 


Magnesium    (1.7-2.3)  mg/dL


 


Total Bilirubin    (0.1-1.2)  mg/dL


 


AST    (5-40)  units/L


 


ALT    (7-56)  units/L


 


Alkaline Phosphatase    ()  units/L


 


Total Creatine Kinase    ()  units/L


 


Total Protein    (6.3-8.2)  g/dL


 


Albumin    (3.9-5)  g/dL


 


Albumin/Globulin Ratio    %


 


Salicylates    (2.8-20.0)  mg/dL


 


Acetaminophen  5.0 L   (10.0-30.0)  ug/mL


 


Plasma/Serum Alcohol   < 0.01  (0-0.07)  %














- EKG Data


-: EKG Interpreted by Me


EKG shows normal: sinus rhythm


Rate: tachycardia





- EKG Data





20 03:34


Sinus rhythm, tachycardia, 120 bpm, there is a borderline leftward axis 

deviation, there is poor R wave progression, motion artifact.  Nonspecific 

interventricular conduction delay.  This EKG is abnormal.  This EKG is not a 

STEMI.  Appears to be unchanged when compared to prior EKG from 2020.





- Radiology Data


Radiology results: pending, report reviewed, image reviewed





Print Report











Referring Physician: ANABEL MONTGOMERY 


 


Patient Name: REJI JJ 


 


Patient ID: [id number] 


 


YOB: 1960 


 


Sex: Male 


 


Accession: [id number] 


 


Report Date: 2020 


 


Report Status: Finalized 


 


Findings


[address] [address] 

XRay Report Signed Patient: REJI JJ MR#: [id number] : 1960 

Acct:[id number] Age/Sex: 60 / M ADM Date: 20 Loc: ED Attending Dr: 

Ordering Physician: ANABEL MONTGOMERY MD Date of Service: 20 Procedure(s):

 XR chest 1V ap Accession Number(s): [id number] cc: ANABEL MONTGOMERY MD Fluoro 

Time In Minutes: CHEST 1 VIEW INDICATION: cough weak. COMPARISON: 2020 

FINDINGS: SUPPORT DEVICES: None. HEART: Within normal limits. LUNGS/PLEURA: No 

acute air space or interstitial disease. ADDITIONAL FINDINGS: None. IMPRESSION: 

1. No acute findings. Signer Name: Chris Barnes MD Signed: 2020 3:52 AM 

Workstation Name: [email or web address] Transcribed By: JW Dictated By: Chris Barnes MD Electronically Authenticated By: Chris Barnes MD Signed Date

/Time: 20 DD/DT: 20 TD/TT:   














Print Report











Referring Physician: ANABEL MONTGOMERY 


 


Patient Name: REJI JJ 


 


Patient ID: [id number] 


 


YOB: 1960 


 


Sex: Male 


 


Accession: [id number] 


 


Report Date: 2020 


 


Report Status: Finalized 


 


Findings


[address] [address] Cat

 Scan Report Signed Patient: REJI JJ MR#: [id number] : 1960 

Acct:[id number] Age/Sex: 60 / M ADM Date: 20 Loc: ED Attending Dr: 

Ordering Physician: ANABEL MONTGOMERY MD Date of Service: 20 Procedure(s):

 CT head/brain wo con Accession Number(s): [id number] cc: ANABEL MONTGOMERY MD CT 

head without contrast HISTORY: Seizure. TECHNIQUE: Axial imaging performed from 

the skull apex through the skull base without the use of contrast. All CT scans 

at this location are performed using CT dose reduction for ALARA by means of 

automated exposure control. COMPARISON: CT head from 2020 FINDINGS: Pa

renchyma: No acute intracranial hemorrhage or parenchymal abnormality. 

Ventricles: There is mild diffuse brain atrophy with commensurate ventricular 

enlargement which is likely age appropriate. Soft tissues: Soft tissues 

including the orbits appear normal. Bones: No acute osseous abnormality. Sinuse

s: Sinuses and mastoid air cells are clear. IMPRESSION: No acute abnormality. 

Signer Name: Chris Barnes MD Signed: 2020 4:34 AM Workstation Name: 

[email or web address] Transcribed By: JW Dictated By: Chris Barnes MD 

Electronically Authenticated By: Chris Barnes MD Signed Date/Time:  DD/DT: 20   











Print Report











Referring Physician: ANABEL MONTGOMERY 


 


Patient Name: REJI JJ 


 


Patient ID: [id number] 


 


YOB: 1960 


 


Sex: Male 


 


Accession: [id number] 


 


Report Date: 2020 


 


Report Status: Finalized 


 


Findings


[facility] [address] [place] [address] Cat

 Scan Report Signed Patient: REJI JJ MR#: [id number] : 1960 

Acct:[id number] Age/Sex: 60 / M ADM Date: 20 Loc: ED Attending Dr: 

Ordering Physician: ANABEL MONTGOMERY MD Date of Service: 20 Procedure(s):

 CT cervical spine wo con Accession Number(s): [id number] cc: ANABEL MONTGOMERY MD 

CT cervical spine spine without contrast INDICATION: Seizure vs Syncope, found 

on floor, confused. TECHNIQUE: Axial imaging performed through the cervical 

spine without the use of contrast. Sagittal and coronal reconstructed images 

were also reviewed. All CT scans at this location are performed using CT dose 

reduction for ALARA by means of automated exposure control. COMPARISON: CT 

cervical spine from 2019 FINDINGS: Alignment: Stepwise 2 mm retrolisthesis 

of C4 on C5 and C6 on C7. Mild kyphosis centered at C3/4. Bones: There is no 

acute osseous abnormality. Moderate multilevel discogenic DJD and facet 

arthropathy with partial ankylosis at the posterior elements of C2/3. Soft 

tissues: No acute or significant incidental soft tissue abnormality. IMPRESSION:

 1. No acute abnormality. 2. Moderately advanced DJD and abnormal spinal 

alignment as above. Signer Name: Chris Barnes MD Signed: 2020 4:36 AM 

Workstation Name: [email or web address] Transcribed By: ARY Dictated By: Chris Barnes MD Electronically Authenticated By: Chris Barnes MD Signed 

Date/Time: 20 DD/DT: 20 TD/TT:   














Print Report











Referring Physician: ANABEL MONTGOMERY 


 


Patient Name: REJI JJ 


 


Patient ID: [id number] 


 


YOB: 1960 


 


Sex: Male 


 


Accession: [id number] 


 


Report Date: 2020 


 


Report Status: Finalized 


 


Findings


[place] [id number] Cat

 Scan Report Signed Patient: REJI JJ MR#: [id number] : 1960 

Acct:[id number] Age/Sex: 60 / M ADM Date: 20 Loc: ED Attending Dr: 

Ordering Physician: ANABEL MONTGOMERY MD Date of Service: 20 Procedure(s):

 CT abdomen pelvis wo con Accession Number(s): [id number] cc: ANABEL MONTGOMERY MD 

CT ABDOMEN AND PELVIS WITHOUT CONTRAST HISTORY: Patient complains of abd pain 

with N/V and weakness, Seizure. COMPARISON: CT abdomen/pelvis from 2018 

TECHNIQUE: CT images of the abdomen and pelvis were obtained without 

administration of intravenous contrast. All CT scans at this location are 

performed using CT dose reduction for ALARA by means of automated exposure 

control. FINDINGS: Lungs/bones: Lung bases are clear. There are degenerative 

changes within the spine and pelvis with no acute osseous abnormality. 

Abdomen/pelvis: The liver, gallbladder, spleen, pancreas, adrenals, and kidneys 

appear unremarkable. There is a moderate-sized hiatal hernia. Proximal GI tract 

otherwise appears unremarkable. Urinary bladder and prostate are unremarkable 

with no pelvic free fluid. There is mild colonic diverticulosis with no acute 

inflammatory change. The appendix is normal. IMPRESSION: 1. No acute abnormality

 identified. Signer Name: Chris Barnes MD Signed: 2020 4:40 AM 

Workstation Name: [email or web address] Transcribed By: ARY Dictated By: Chris worthy MD Electronically Authenticated By: Chris Barnes MD Signed Date/Time:

 20 DD/DT: 20 TD/TT:   











Critical care attestation.: 


If time is entered above; I have spent that time in minutes in the direct care 

of this critically ill patient, excluding procedure time.








ED Disposition


Clinical Impression: 


 Hypomagnesemia, Metabolic acidosis, History of seizure, Implantable 

cardioverter-defibrillator (ICD) in situ, Headache, Abdominal pain





Disposition:  OP ADMIT IP TO THIS HOSP


Is pt being admited?: Yes


Does the pt Need Aspirin: No


Condition: Good

## 2020-12-01 NOTE — CAT SCAN REPORT
CT ABDOMEN AND PELVIS WITHOUT CONTRAST



HISTORY: Patient complains of abd pain with N/V and weakness, Seizure.



COMPARISON: CT abdomen/pelvis from 12/22/2018



TECHNIQUE: CT images of the abdomen and pelvis were obtained without administration of intravenous co
ntrast.  All CT scans at this location are performed using CT dose reduction for ALARA by means of au
tomated exposure control. 



FINDINGS:



Lungs/bones:  Lung bases are clear. There are degenerative changes within the spine and pelvis with n
o acute osseous abnormality.



Abdomen/pelvis:  The liver, gallbladder, spleen, pancreas, adrenals, and kidneys appear unremarkable.
 There is a moderate-sized hiatal hernia. Proximal GI tract otherwise appears unremarkable.



Urinary bladder and prostate are unremarkable with no pelvic free fluid. There is mild colonic divert
iculosis with no acute inflammatory change. The appendix is normal.



IMPRESSION:

1. No acute abnormality identified.



Signer Name: Chris Barnes MD 

Signed: 12/1/2020 4:40 AM

Workstation Name: [id number]

## 2020-12-02 LAB
BUN SERPL-MCNC: 7 MG/DL (ref 9–20)
BUN/CREAT SERPL: 8 %
CALCIUM SERPL-MCNC: 9.2 MG/DL (ref 8.4–10.2)
HEMOLYSIS INDEX: 1

## 2020-12-02 RX ADMIN — LEVETIRACETAM SCH MG: 500 SOLUTION ORAL at 10:08

## 2020-12-02 RX ADMIN — Medication SCH ML: at 23:04

## 2020-12-02 RX ADMIN — SPIRONOLACTONE SCH MG: 25 TABLET ORAL at 10:00

## 2020-12-02 RX ADMIN — GABAPENTIN SCH MG: 300 CAPSULE ORAL at 09:58

## 2020-12-02 RX ADMIN — NIFEDIPINE SCH MG: 60 TABLET, EXTENDED RELEASE ORAL at 10:00

## 2020-12-02 RX ADMIN — OXYCODONE AND ACETAMINOPHEN PRN TAB: 5; 325 TABLET ORAL at 23:05

## 2020-12-02 RX ADMIN — PANTOPRAZOLE SODIUM SCH MG: 40 TABLET, DELAYED RELEASE ORAL at 10:08

## 2020-12-02 RX ADMIN — PANTOPRAZOLE SODIUM SCH MG: 40 TABLET, DELAYED RELEASE ORAL at 19:00

## 2020-12-02 RX ADMIN — LEVETIRACETAM SCH MG: 500 SOLUTION ORAL at 23:03

## 2020-12-02 RX ADMIN — METOPROLOL SUCCINATE SCH MG: 100 TABLET, EXTENDED RELEASE ORAL at 09:59

## 2020-12-02 RX ADMIN — Medication SCH ML: at 10:08

## 2020-12-02 NOTE — XRAY REPORT
BILATERAL ANKLES 4 VIEWS



INDICATION / CLINICAL INFORMATION:

pain/swelling.



COMPARISON:

None available.



FINDINGS:

Moderate-sized plantar calcaneal spur on the left. Vascular calcification is seen bilaterally. No oth
er significant skeletal abnormality



Signer Name: Uriah Schwartz MD FACR 

Signed: 12/2/2020 2:11 PM

Workstation Name: [id number]

## 2020-12-02 NOTE — XRAY REPORT
RIGHT KNEE 2 VIEWS



INDICATION / CLINICAL INFORMATION:

pain/swelling.



COMPARISON:

None available.



FINDINGS:

Moderate lateral and patellofemoral degenerative change with chondrocalcinosis. No change from prior 
examination dated 9/15/2020



Signer Name: Uriah Schwartz MD FACR 

Signed: 12/2/2020 2:10 PM

Workstation Name: [id number]

## 2020-12-02 NOTE — PROGRESS NOTE
Assessment and Plan





- Patient Problems


(1) Seizure


Current Visit: Yes   Status: Acute   


Plan to address problem: 


Patient has a history of seizures and reports compliance with home Keppra 500 

mg twice daily


Presented with possible seizure causing ground-level fall


Received 1 g Keppra IV in the ED


Neurology consulted


Home p.o. Keppra dose increased to 750 twice daily


Seizure precautions


As needed Ativan for seizures


12/1 CT head shows no acute abnormalities


12/1 EEG pending


Per neurology: No driving for 6 months, since patient has AICD MRI can be done 

prior to calling [facility], outpatient EEG, if the patient continues to be 

drowsy repeat CT brain, need to follow-up with neurology outpatient (Dr. Osmin Gomes).  Patient had MRI brain/cervical and thoracic spine at [place], patient

needs close outpatient neurology follow-up.








(2) HERMELINDA (acute kidney injury)


Current Visit: No   Status: Acute   


Plan to address problem: 


Presented with a creatinine of 1.5/BUN 13


Reviewed prior visits patient's baseline creatinine seems to be 0.8-1


S/p 250 mL bolus of normal saline in the ED


Renally dose medications


Avoid nephrotoxic medications


Strict intake and output


Trend BMP


 s/p 1/2 NS at 75 for 1 L


Hold home lisinopril for now


12/2 creatinine 0.9/BUN 7








(3) Hypomagnesemia


Current Visit: Yes   Status: Acute   


Plan to address problem: 


Presented with magnesium of 1.5


Received 2 mg of magnesium in the ED


Trend magnesium and replete as needed


12/2 magnesium 1.1, repleted with 4 grams mag sulfate IV








(4) Dilated cardiomyopathy


Current Visit: No   Status: Chronic   


Plan to address problem: 


2017 LHC: normal coronaries, EF 25-30%.


12/2019 echocardiogram: EF 50-55%


9/2020 echocardiogram: Normal LVEF 60-65%


Restarted home cardioprotective medications


Strict intake and output


Daily weights


Cardiac diet








(5) Hypertension


Current Visit: No   Status: Chronic   


Plan to address problem: 


Restarted home antihypertensive regimen


Blood pressure monitoring per protocol


As needed hydralazine for SBP greater than 160








(6) Atrial fibrillation and flutter


Current Visit: Yes   Status: Chronic   


Plan to address problem: 


Restarted home beta-blocker and anticoagulation


Remote telemetry


Supportive care








(7) GERD (gastroesophageal reflux disease)


Current Visit: Yes   Status: Chronic   


Plan to address problem: 


Restarted home PPI


Supportive care








(8) Depression


Current Visit: Yes   Status: Chronic   


Plan to address problem: 


Restarted home antidepressant


Supportive care


Currently denies any SI/HI








(9) Neuropathy


Current Visit: Yes   Status: Chronic   


Plan to address problem: 


Restarted home medications


supportive care








(10) Hyponatremia


Current Visit: Yes   Status: Acute   


Plan to address problem: 


12/10 sodium 135


trend BMP


Monitor neuro status








(11) Hypochloremia


Current Visit: Yes   Status: Acute   


Plan to address problem: 


12/2 chloride 96.7


Trend BMP








(12) Frequent falls


Current Visit: Yes   Status: Acute   


Plan to address problem: 


Patient reports frequent falls at home


PT consulted


12/1 CT head/brain shows no acute abnormality, mild diffuse brain atrophy


12/1 CT C-spine shows no acute abnormality, moderately advanced degenerative 

joint disease at C2/C3 and abnormal spine no alignment C4-C5, C6-C7


Patient complains of bilateral ankle swelling and pain with right knee swelling


12/2 bilateral ankle x-ray pending


12/2 right knee ankle x-ray is pending











(13) DVT prophylaxis


Current Visit: No   Status: Acute   


Plan to address problem: 


SCDs to bilateral lower extremities while in bed


Heparin subcu








History


Interval history: 


This is a 60-year-old male with arthritis, nonischemic cardiomyopathy, frequent 

falls, chronic back pain, hypertension, seizures, orthostatic hypotension, 

hypertension, GERD, depression, atrial fibrillation s/p ICD in situ, V. tach, 

asthma and anemia emergency department on 12/1 after being found down. Work-up 

in the emergency department included a CTh which showed mild diffuse brain 

atrophy with commensurate ventricular enlargement inline with age, CT C spine 

showed moderately advanced DJD and abnormal spinal alignment wihtout any acute 

abnormality, cxr with no acute findings, CT abd/pelvis which showed degenerative

changes within the spine and pelvis, moderate-sized hiatal hernia, and mild 

colonic diverticulosis. Lab work revealed metabolic acidosis (CO2 15), acute 

kidney injury with CR 1.5 (baseline 0.8-1), and hypomagnesemia at 1.5.  In the 

emergency department he received 2mg of magnesium and 1 g of Keppra.  Neurology 

has been consulted.  This morning patient complains of bilateral ankle pain and 

swelling, right knee swelling and pain and has bruising and pain to lower back 

therefore x-ray of his bilateral ankles and right knee were ordered.  Patient 

pain medications adjusted.  PT consulted for evaluation regarding frequent 

falls.  No acute events reported overnight.  Patient states that he could not 

sleep overnight and takes trazodone at home for sleep however it is not on his 

home medication list.  We will start low-dose melatonin nightly prn for sleep.














Hospitalist Physical





- Constitutional


Vitals: 


                                        











Temp Pulse Resp BP Pulse Ox


 


 98.2 F   93 H  18   125/86   96 


 


 12/02/20 08:13  12/02/20 10:00  12/02/20 08:13  12/02/20 08:13  12/02/20 08:13











General appearance: Present: no acute distress





- EENT


Eyes: Present: PERRL, EOM intact


ENT: hearing intact, clear oral mucosa





- Neck


Neck: Present: supple, normal ROM





- Respiratory


Respiratory effort: normal


Respiratory: bilateral: CTA





- Cardiovascular


Rhythm: irregularly irregular


Heart Sounds: Present: S1 & S2.  Absent: systolic murmur





- Extremities


Extremities: no ischemia, pulses intact, pulses symmetrical, No edema, normal 

temperature, normal color, Full ROM


Peripheral Pulses: within normal limits





- Abdominal


General gastrointestinal: soft, non-tender, non-distended, normal bowel sounds





- Integumentary


Integumentary: Present: warm, dry





- Psychiatric


Psychiatric: cooperative





- Neurologic


Neurologic: CNII-XII intact, no focal deficits, moves all extremities





- Allied Health


Allied health notes reviewed: nursing





HEART Score





- HEART Score


EKG: Normal


Age: 45-65


Risk factors: 1-2 risk factors


Troponin: < normal limit





Results





- Labs


CBC & Chem 7: 


                                 12/01/20 08:24





                                 12/02/20 04:50


Labs: 


                             Laboratory Last Values











WBC  8.0 K/mm3 (4.5-11.0)   12/01/20  08:24    


 


RBC  2.85 M/mm3 (3.65-5.03)  L  12/01/20  08:24    


 


Hgb  8.8 gm/dl (11.8-15.2)  L  12/01/20  08:24    


 


Hct  25.3 % (35.5-45.6)  L  12/01/20  08:24    


 


MCV  89 fl (84-94)   12/01/20  08:24    


 


MCH  31 pg (28-32)   12/01/20  08:24    


 


MCHC  35 % (32-34)  H  12/01/20  08:24    


 


RDW  18.7 % (13.2-15.2)  H  12/01/20  08:24    


 


Plt Count  237 K/mm3 (140-440)   12/01/20  08:24    


 


Add Manual Diff  Complete   12/01/20  03:21    


 


Total Counted  100   12/01/20  03:21    


 


Seg Neuts % (Manual)  85.0 % (40.0-70.0)  H  12/01/20  03:21    


 


Band Neutrophils %  0 %  12/01/20  03:21    


 


Lymphocytes % (Manual)  7.0 % (13.4-35.0)  L  12/01/20  03:21    


 


Reactive Lymphs % (Man)  0 %  12/01/20  03:21    


 


Monocytes % (Manual)  8.0 % (0.0-7.3)  H  12/01/20  03:21    


 


Eosinophils % (Manual)  0 % (0.0-4.3)   12/01/20  03:21    


 


Basophils % (Manual)  0 % (0.0-1.8)   12/01/20  03:21    


 


Metamyelocytes %  0 %  12/01/20  03:21    


 


Myelocytes %  0 %  12/01/20  03:21    


 


Promyelocytes %  0 %  12/01/20  03:21    


 


Blast Cells %  0 %  12/01/20  03:21    


 


Nucleated RBC %  Not Reportable   12/01/20  03:21    


 


Seg Neutrophils # Man  7.5 K/mm3 (1.8-7.7)   12/01/20  03:21    


 


Band Neutrophils #  0.0 K/mm3  12/01/20  03:21    


 


Lymphocytes # (Manual)  0.6 K/mm3 (1.2-5.4)  L  12/01/20  03:21    


 


Abs React Lymphs (Man)  0.0 K/mm3  12/01/20  03:21    


 


Monocytes # (Manual)  0.7 K/mm3 (0.0-0.8)   12/01/20  03:21    


 


Eosinophils # (Manual)  0.0 K/mm3 (0.0-0.4)   12/01/20  03:21    


 


Basophils # (Manual)  0.0 K/mm3 (0.0-0.1)   12/01/20  03:21    


 


Metamyelocytes #  0.0 K/mm3  12/01/20  03:21    


 


Myelocytes #  0.0 K/mm3  12/01/20  03:21    


 


Promyelocytes #  0.0 K/mm3  12/01/20  03:21    


 


Blast Cells #  0.0 K/mm3  12/01/20  03:21    


 


WBC Morphology  Not Reportable   12/01/20  03:21    


 


Hypersegmented Neuts  Not Reportable   12/01/20  03:21    


 


Hyposegmented Neuts  Not Reportable   12/01/20  03:21    


 


Hypogranular Neuts  Not Reportable   12/01/20  03:21    


 


Smudge Cells  Not Reportable   12/01/20  03:21    


 


Toxic Granulation  Not Reportable   12/01/20  03:21    


 


Toxic Vacuolation  Not Reportable   12/01/20  03:21    


 


Dohle Bodies  Not Reportable   12/01/20  03:21    


 


Pelger-Huet Anomaly  Not Reportable   12/01/20  03:21    


 


Peyton Rods  Not Reportable   12/01/20  03:21    


 


Platelet Estimate  Consistent w auto   12/01/20  03:21    


 


Clumped Platelets  Not Reportable   12/01/20  03:21    


 


Plt Clumps, EDTA  Not Reportable   12/01/20  03:21    


 


Large Platelets  Not Reportable   12/01/20  03:21    


 


Giant Platelets  Not Reportable   12/01/20  03:21    


 


Platelet Satelliting  Not Reportable   12/01/20  03:21    


 


Plt Morphology Comment  Not Reportable   12/01/20  03:21    


 


RBC Morphology  Not Reportable   12/01/20  03:21    


 


Dimorphic RBCs  Not Reportable   12/01/20  03:21    


 


Polychromasia  Not Reportable   12/01/20  03:21    


 


Hypochromasia  Few   12/01/20  03:21    


 


Poikilocytosis  Not Reportable   12/01/20  03:21    


 


Anisocytosis  1+   12/01/20  03:21    


 


Microcytosis  Few   12/01/20  03:21    


 


Macrocytosis  Not Reportable   12/01/20  03:21    


 


Spherocytes  Not Reportable   12/01/20  03:21    


 


Pappenheimer Bodies  Not Reportable   12/01/20  03:21    


 


Sickle Cells  Not Reportable   12/01/20  03:21    


 


Target Cells  Few   12/01/20  03:21    


 


Tear Drop Cells  Not Reportable   12/01/20  03:21    


 


Ovalocytes  Few   12/01/20  03:21    


 


Helmet Cells  Not Reportable   12/01/20  03:21    


 


Aguirre-Toad Hop Bodies  Not Reportable   12/01/20  03:21    


 


Cabot Rings  Not Reportable   12/01/20  03:21    


 


Danette Cells  Not Reportable   12/01/20  03:21    


 


Bite Cells  Not Reportable   12/01/20  03:21    


 


Crenated Cell  Not Reportable   12/01/20  03:21    


 


Elliptocytes  Not Reportable   12/01/20  03:21    


 


Acanthocytes (Spur)  Not Reportable   12/01/20  03:21    


 


Rouleaux  Not Reportable   12/01/20  03:21    


 


Hemoglobin C Crystals  Not Reportable   12/01/20  03:21    


 


Schistocytes  Not Reportable   12/01/20  03:21    


 


Malaria parasites  Not Reportable   12/01/20  03:21    


 


George Bodies  Not Reportable   12/01/20  03:21    


 


Hem Pathologist Commnt  No   12/01/20  03:21    


 


Sodium  135 mmol/L (137-145)  L D 12/02/20  04:50    


 


Potassium  4.0 mmol/L (3.6-5.0)   12/02/20  04:50    


 


Chloride  96.7 mmol/L ()  L  12/02/20  04:50    


 


Carbon Dioxide  28 mmol/L (22-30)  D 12/02/20  04:50    


 


Anion Gap  14 mmol/L  12/02/20  04:50    


 


BUN  7 mg/dL (9-20)  L  12/02/20  04:50    


 


Creatinine  0.9 mg/dL (0.8-1.3)   12/02/20  04:50    


 


Estimated GFR  > 60 ml/min  12/02/20  04:50    


 


BUN/Creatinine Ratio  8 %  12/02/20  04:50    


 


Glucose  97 mg/dL ()   12/02/20  04:50    


 


POC Glucose  88 mg/dL ()   12/02/20  08:10    


 


Calcium  9.2 mg/dL (8.4-10.2)   12/02/20  04:50    


 


Magnesium  1.10 mg/dL (1.7-2.3)  L  12/02/20  12:27    


 


Total Bilirubin  0.80 mg/dL (0.1-1.2)   12/01/20  03:21    


 


AST  78 units/L (5-40)  H  12/01/20  03:21    


 


ALT  26 units/L (7-56)   12/01/20  03:21    


 


Alkaline Phosphatase  107 units/L ()   12/01/20  03:21    


 


Total Creatine Kinase  285 units/L ()  H  12/01/20  03:21    


 


Total Protein  7.6 g/dL (6.3-8.2)   12/01/20  03:21    


 


Albumin  4.5 g/dL (3.9-5)   12/01/20  03:21    


 


Albumin/Globulin Ratio  1.5 %  12/01/20  03:21    


 


TSH  2.750 mlU/mL (0.270-4.200)   12/01/20  03:21    


 


Urine Color  Yellow  (Yellow)   12/01/20  04:49    


 


Urine Turbidity  Clear  (Clear)   12/01/20  04:49    


 


Urine pH  5.0  (5.0-7.0)   12/01/20  04:49    


 


Ur Specific Gravity  1.018  (1.003-1.030)   12/01/20  04:49    


 


Urine Protein  100 mg/dl mg/dL (Negative)   12/01/20  04:49    


 


Urine Glucose (UA)  50 mg/dL (Negative)   12/01/20  04:49    


 


Urine Ketones  Neg mg/dL (Negative)   12/01/20  04:49    


 


Urine Blood  Sm  (Negative)   12/01/20  04:49    


 


Urine Nitrite  Neg  (Negative)   12/01/20  04:49    


 


Urine Bilirubin  Neg  (Negative)   12/01/20  04:49    


 


Urine Urobilinogen  < 2.0 mg/dL (<2.0)   12/01/20  04:49    


 


Ur Leukocyte Esterase  Neg  (Negative)   12/01/20  04:49    


 


Urine WBC (Auto)  1.0 /HPF (0.0-6.0)   12/01/20  04:49    


 


Urine RBC (Auto)  2.0 /HPF (0.0-6.0)   12/01/20  04:49    


 


U Epithel Cells (Auto)  < 1.0 /HPF (0-13.0)   12/01/20  04:49    


 


Hyaline Casts  23 /LPF  12/01/20  04:49    


 


Urine Mucus  Few /HPF  12/01/20  04:49    


 


Salicylates  < 0.3 mg/dL (2.8-20.0)  L  12/01/20  03:21    


 


Acetaminophen  5.0 ug/mL (10.0-30.0)  L  12/01/20  03:21    


 


Plasma/Serum Alcohol  < 0.01 % (0-0.07)   12/01/20  03:21    











[place]/IV: 


                                        





Voiding Method                   Urinal


IV Catheter Type [Left           Peripheral IV


Antecubital]                     


IV Catheter Type [Left Wrist]    INT / Saline Lock











Active Medications





- Current Medications


Current Medications: 














Generic Name Dose Route Start Last Admin





  Trade Name Freq  PRN Reason Stop Dose Admin


 


Acetaminophen  650 mg  12/01/20 07:53  12/02/20 03:38





  Tylenol  PO   650 mg





  Q4H PRN   Administration





  Pain MILD(1-3)/Fever >100.5/HA  


 


Amlodipine Besylate  10 mg  12/01/20 10:00  12/02/20 10:00





  Amlodipine  PO   10 mg





  QDAY JORDANA   Administration


 


Apixaban  5 mg  12/01/20 10:00  12/01/20 09:56





  Eliquis  PO   5 mg





  Q48HR JORDANA   Administration





  Protocol  


 


Duloxetine HCl  30 mg  12/01/20 10:00  12/02/20 10:00





  Cymbalta  PO   30 mg





  DAILY JORDANA   Administration


 


Gabapentin  300 mg  12/01/20 10:00  12/02/20 09:58





  Gabapentin  PO   300 mg





  DAILY JORDANA   Administration


 


Hydralazine HCl  10 mg  12/01/20 11:08 





  Apresoline  IV  





  Q4HR PRN  





  Hypertension  


 


Magnesium Sulfate  2 gm in 50 mls @ 25 mls/hr  12/02/20 12:00 





  Magnesium Sulfate 2gm/50ml  IV  12/02/20 13:59 





  ONCE ONE  


 


Levetiracetam  750 mg  12/01/20 10:00  12/02/20 10:08





  Keppra  PO   750 mg





  BID JORDANA   Administration


 


Lorazepam  1 mg  12/01/20 11:00 





  Ativan  IV  





  Q4H PRN  





  Seizures  


 


Metoprolol Succinate  100 mg  12/01/20 10:00  12/02/20 09:59





  Metoprolol Xl  PO   100 mg





  QDAY JORDANA   Administration


 


Nifedipine  60 mg  12/01/20 10:00  12/02/20 10:00





  Procardia Xl  PO   60 mg





  Q24HR JORDANA   Administration


 


Ondansetron HCl  4 mg  12/01/20 07:53 





  Zofran  IV  





  Q6HR PRN  





  Nausea And Vomiting  


 


Oxycodone/Acetaminophen  1 tab  12/01/20 07:53  12/01/20 22:37





  Percocet 5/325  PO   1 tab





  Q6H PRN   Administration





  Pain, Moderate (4-6)  


 


Pantoprazole Sodium  40 mg  12/01/20 10:00  12/02/20 10:08





  Protonix  PO   40 mg





  BIDAC JORDANA   Administration


 


Sodium Chloride  10 ml  12/01/20 10:00  12/02/20 10:08





  Sodium Chloride Flush Syringe 10 Ml  IV   10 ml





  BID JORDANA   Administration


 


Sodium Chloride  10 ml  12/01/20 07:53 





  Sodium Chloride Flush Syringe 10 Ml  IV  





  PRN PRN  





  LINE FLUSH  


 


Spironolactone  25 mg  12/01/20 10:00  12/02/20 10:00





  Aldactone  PO   25 mg





  QDAY JORDANA   Administration

## 2020-12-03 VITALS — SYSTOLIC BLOOD PRESSURE: 137 MMHG | DIASTOLIC BLOOD PRESSURE: 99 MMHG

## 2020-12-03 LAB
BUN SERPL-MCNC: 14 MG/DL (ref 9–20)
BUN/CREAT SERPL: 11 %
CALCIUM SERPL-MCNC: 9.6 MG/DL (ref 8.4–10.2)
HEMOLYSIS INDEX: 10

## 2020-12-03 RX ADMIN — GABAPENTIN SCH MG: 300 CAPSULE ORAL at 10:00

## 2020-12-03 RX ADMIN — SPIRONOLACTONE SCH MG: 25 TABLET ORAL at 10:01

## 2020-12-03 RX ADMIN — NIFEDIPINE SCH MG: 60 TABLET, EXTENDED RELEASE ORAL at 10:00

## 2020-12-03 RX ADMIN — LEVETIRACETAM SCH MG: 500 SOLUTION ORAL at 10:04

## 2020-12-03 RX ADMIN — PANTOPRAZOLE SODIUM SCH MG: 40 TABLET, DELAYED RELEASE ORAL at 10:01

## 2020-12-03 RX ADMIN — Medication SCH ML: at 10:04

## 2020-12-03 RX ADMIN — APIXABAN SCH MG: 5 TABLET, FILM COATED ORAL at 10:01

## 2020-12-03 NOTE — VASCULAR LAB REPORT
DUPLEX DOPPLER ULTRASOUND CAROTID, BILATERAL



INDICATION:

c/o near syncope/syncope episode.



FINDINGS:



RIGHT CAROTID: Mild atherosclerotic plaque in the carotid bulb



Right CCA velocity: 70 cm/sec.

Right ICA peak systolic velocity: 82 cm/sec.

ICA/CCA PSV Ratio: 1.2.



Right Vertebral Artery: Antegrade flow.



LEFT CAROTID: Mild atherosclerotic plaque in the carotid bulb



Left CCA velocity: 56 cm/sec.

Left ICA peak systolic velocity: 93 cm/sec.

ICA/CCA PSV Ratio: 1.7.



Left Vertebral Artery: Antegrade flow.



IMPRESSION:

1. Right Internal Carotid Artery: Less than 50% diameter stenosis.

2. Left Internal Carotid Artery: Less than 50% diameter stenosis.







Velocity criteria are extrapolated from diameter data as defined by the Society of Radiologists in Ul
trasound Consensus Conference, Radiology 2003; 229;340-346.



Degree of Stenosis (%) || ICA PSV (cm/sec) || Plaque estimate (%) || ICA/CCA PSV Ratio

           Normal                                <125                           None                 
                 <2.0  

             <50                                   <125                            <50               
                     <2.0

            50-69                               125-230                        50                    
               2.0-4.0

 70 but less than 100                  >230                            50                            
        >4.0

     Near occlusion                 High, low, or none            visible                            
     variable 

     Total occlusion                         None                    visible; no lumen               
        N/A

  



Signer Name: Juanito Hong MD 

Signed: 12/3/2020 10:54 AM

Workstation Name: [id number]

## 2020-12-03 NOTE — DISCHARGE SUMMARY
Providers





- Providers


Date of Admission: 


12/01/20 07:27





Date of discharge: 12/03/20


Attending physician: 


AMY J KOCHERLA





                                        





12/01/20 07:53


Consult to Physician [CONS] Routine 


   Comment: 


   Consulting Provider: ELIEZER PAK


   Physician Instructions: 


   Reason For Exam: possible breakthrough seizure





12/02/20 11:28


Physical Therapy Evaluation and Treat [CONS] Routine 


   Comment: 


   Reason For Exam: falls at home











Primary care physician: 


PRIMARY CARE MD








Hospitalization


Condition: Good


Hospital course: 


This is a 60-year-old male with arthritis, nonischemic cardiomyopathy, frequent 

falls, chronic back pain, hypertension, seizures, orthostatic hypotension, 

hypertension, GERD, depression, atrial fibrillation s/p ICD in situ, V. tach, 

asthma and anemia emergency department on 12/1 after being found down. Work-up 

in the emergency department included a CTh which showed mild diffuse brain 

atrophy with commensurate ventricular enlargement inline with age, CT C spine 

showed moderately advanced DJD and abnormal spinal alignment wihtout any acute 

abnormality, cxr with no acute findings, CT abd/pelvis which showed degenerative

 changes within the spine and pelvis, moderate-sized hiatal hernia, and mild 

colonic diverticulosis. Lab work revealed metabolic acidosis (CO2 15), acute 

kidney injury with CR 1.5 (baseline 0.8-1), and hypomagnesemia at 1.5.  In the 

emergency department he received 2mg of magnesium and 1 g of Keppra.  Neurology 

has been consulted and his home Keppra dose was increased. The patient 

complained of bilateral ankle pain and swelling, right knee swelling and pain 

and has bruising and pain to lower back therefore x-ray of his bilateral ankles 

and right knee were ordered which showed now acute fracture. PT consultation 

completed and recommended home PT. Given syncopal episode complaints we obtained

 orthostatic blood pressure and bilateral carotid US which showed less than 50% 

occlusion.  Patient will need to follow-up with his neurologist outpatient and 

obtain an EEG.  Patient will need to follow-up with his primary care physician 

within 1 to 2 weeks of discharge.  Given recurrent syncopal episodes, patient 

will need to increase his sodium intake for persistent hyponatremia from 

previous visits and studies before standing up or changing positions.








Assessment and Plan





- Patient Problems


(1) Seizure


Current Visit: Yes   Status: Acute on chronic


Plan to address problem: 


Patient has a history of seizures and reports compliance with home Keppra 500 

mg twice daily


Presented with possible seizure causing ground-level fall


Received 1 g Keppra IV in the ED


Neurology consulted


Home p.o. Keppra dose increased to 750 twice daily


12/1 CT head shows no acute abnormalities


12/1 EEG, will need to obtain outpatient EEG


Per neurology: No driving for 6 months, since patient has AICD MRI can be done 

prior to calling [facility], outpatient EEG, if the patient continues to be 

drowsy repeat CT brain, need to follow-up with neurology outpatient (Dr. Osmin Gomes).  Patient had MRI brain/cervical and thoracic spine at [facility], patient

 needs close outpatient neurology follow-up.





(2) HERMELINDA (acute kidney injury)


Current Visit: No   Status: Resolved


Plan to address problem: 


Presented with a creatinine of 1.5/BUN 13


Reviewed prior visits patient's baseline creatinine seems to be 0.8-1


S/p 250 mL bolus of normal saline in the ED


Renally dose medications


Avoid nephrotoxic medications


Strict intake and output


Trend BMP


 s/p 1/2 NS at 75 for 1 L


Hold home lisinopril for now


12/2 creatinine 0.9/BUN 7





(3) Hypomagnesemia


Current Visit: Yes   Status: Continue home PPI


Plan to address problem: 


Presented with magnesium of 1.5


Received 2 mg of magnesium in the ED


Trend magnesium and replete as needed


12/2 magnesium 1.1, repleted with 4 grams mag sulfate IV


Discharge magnesium 1.9





(4) Dilated cardiomyopathy


Current Visit: No   Status: Chronic   


Plan to address problem: 


2017 C: normal coronaries, EF 25-30%.


12/2019 echocardiogram: EF 50-55%


9/2020 echocardiogram: Normal LVEF 60-65%


Continue home cardioprotective medications


Follow-up with outpatient cardiology for primary care physician post discharge





(5) Hypertension


Current Visit: No   Status: Chronic   


Plan to address problem: 


Restarted home antihypertensive regimen


Blood pressure monitoring per PCP instructions





(6) Atrial fibrillation and flutter


Current Visit: Yes   Status: Chronic   


Plan to address problem: 


Continue home beta-blocker at reduced dose and anticoagulation





(7) GERD (gastroesophageal reflux disease)


Current Visit: Yes   Status: Chronic   


Plan to address problem: 


Continue home PPI





(8) Depression


Current Visit: Yes   Status: Chronic   


Plan to address problem: 


Continue home antidepressant therapy


Follow-up with outpatient psychiatric care





(9) Neuropathy


Current Visit: Yes   Status: Chronic   


Plan to address problem: 


Continue home gabapentin





(10) Hyponatremia


Current Visit: Yes   Status: Acute on chronic


Plan to address problem: 


12/1 sodium 144, 12/2 sodium 135, 12/3 sodium 133


Increase intake of p.o. salt





(11) Hypochloremia


Current Visit: Yes   Status: Acute   


Plan to address problem: 


12/2 chloride 96.7, 12/3 95.5


Follow-up with primary care physician





(12) Frequent falls


Current Visit: Yes   Status: Acute   


Plan to address problem: 


Patient reports frequent falls at home


PT consulted who recommended home health PT


12/1 CT head/brain shows no acute abnormality, mild diffuse brain atrophy


12/1 CT C-spine shows no acute abnormality, moderately advanced degenerative 

joint disease at C2/C3 and abnormal spine no alignment C4-C5, C6-C7


12/2 bilateral ankle x-ray shows no acute fracture


12/2 right knee ankle x-ray shows no acute fracture





Disposition: [facility] TO HOME OR SELFCARE


Time spent for discharge: 35





Core Measure Documentation





- Palliative Care


Palliative Care/ Comfort Measures: Not Applicable





- Core Measures


Any of the following diagnoses?: history only





Exam





- Constitutional


Vitals: 


                                        











Temp Pulse Resp BP Pulse Ox


 


 98.6 F   82   18   137/99   97 


 


 12/03/20 08:06  12/03/20 10:01  12/03/20 08:06  12/03/20 08:06  12/03/20 08:06











General appearance: Present: no acute distress





- EENT


Eyes: Present: PERRL, EOM intact


ENT: hearing intact, clear oral mucosa





- Neck


Neck: Present: supple, normal ROM





- Respiratory


Respiratory effort: normal


Respiratory: bilateral: CTA





- Cardiovascular


Rhythm: regular


Heart Sounds: Present: S1 & S2.  Absent: systolic murmur, diastolic murmur





- Extremities


Extremities: no ischemia, pulses intact, pulses symmetrical, No edema, normal 

temperature, normal color, Full ROM


Peripheral Pulses: within normal limits





- Abdominal


General gastrointestinal: Present: soft, non-tender, non-distended, normal bowel

 sounds





- Integumentary


Integumentary: Present: clear, warm, dry





- Musculoskeletal


Musculoskeletal: strength equal bilaterally





- Psychiatric


Psychiatric: cooperative





- Neurologic


Neurologic: CNII-XII intact, no focal deficits, moves all extremities





Plan


Activity: advance as tolerated, no driving until cleared by PCP


Diet: low fat, low cholesterol


Special Instructions: record daily BP diary, physical therapy


Additional Instructions: Continues emergency department or contact primary care 

physician if experience worsening symptoms.  Follow-up with your neurologist and

 obtain a outpatient EEG within 1 to 2 weeks of discharge.  Follow-up with your 

cardiologist within 1 to 2 weeks of discharge.  You will be discharged with home

 health PT. Please steady yourself bedore position changes and increase your 

intake of sodium for persistent low sodium levels. Also you will be discharged 

with 7 days of magisium supplement for persistent low magnesium.


Follow up with: 


PRIMARY CARE,MD [Primary Care Provider] - 3-5 Days


SILVER STANLEY MD [Staff Physician] - 7 Days


Prescriptions: 


amLODIPine 10 mg PO QDAY #30 tablet


levETIRAcetam [Keppra] 750 mg PO BID #60 oral.liqd


Magnesium Oxide [Mag-Ox] 400 mg PO QDAY 7 Days #7 tablet


Metoprolol Xl [Metoprolol SUCCINATE ER TAB] 50 mg PO QDAY #30 tablet


traMADoL [Ultram 50 MG tab] 50 mg PO Q6H PRN #30 tablet


 PRN Reason: Pain, Moderate (4-6)

## 2021-08-08 ENCOUNTER — HOSPITAL ENCOUNTER (INPATIENT)
Dept: HOSPITAL 5 - ED | Age: 61
LOS: 9 days | Discharge: HOME HEALTH | DRG: 391 | End: 2021-08-17
Attending: INTERNAL MEDICINE | Admitting: INTERNAL MEDICINE
Payer: MEDICARE

## 2021-08-08 DIAGNOSIS — E87.1: ICD-10-CM

## 2021-08-08 DIAGNOSIS — N17.0: ICD-10-CM

## 2021-08-08 DIAGNOSIS — I48.91: ICD-10-CM

## 2021-08-08 DIAGNOSIS — K22.70: ICD-10-CM

## 2021-08-08 DIAGNOSIS — K21.00: Primary | ICD-10-CM

## 2021-08-08 DIAGNOSIS — I42.8: ICD-10-CM

## 2021-08-08 DIAGNOSIS — E86.0: ICD-10-CM

## 2021-08-08 DIAGNOSIS — I10: ICD-10-CM

## 2021-08-08 DIAGNOSIS — K44.9: ICD-10-CM

## 2021-08-08 DIAGNOSIS — F10.10: ICD-10-CM

## 2021-08-08 LAB
ALBUMIN SERPL-MCNC: 4.1 G/DL (ref 3.9–5)
ALT SERPL-CCNC: 26 UNITS/L (ref 7–56)
APTT BLD: 28.6 SEC. (ref 24.2–36.6)
BASOPHILS # (AUTO): 0 K/MM3 (ref 0–0.1)
BASOPHILS NFR BLD AUTO: 0.3 % (ref 0–1.8)
BUN SERPL-MCNC: 21 MG/DL (ref 9–20)
BUN/CREAT SERPL: 5 %
CALCIUM SERPL-MCNC: 8.8 MG/DL (ref 8.4–10.2)
EOSINOPHIL # BLD AUTO: 0 K/MM3 (ref 0–0.4)
EOSINOPHIL NFR BLD AUTO: 0 % (ref 0–4.3)
HCT VFR BLD CALC: 34.1 % (ref 35.5–45.6)
HEMOLYSIS INDEX: 4
HGB BLD-MCNC: 11.4 GM/DL (ref 11.8–15.2)
INR PPP: 1.02 (ref 0.87–1.13)
LYMPHOCYTES # BLD AUTO: 0.6 K/MM3 (ref 1.2–5.4)
LYMPHOCYTES NFR BLD AUTO: 8.8 % (ref 13.4–35)
MCHC RBC AUTO-ENTMCNC: 33 % (ref 32–34)
MCV RBC AUTO: 85 FL (ref 84–94)
MONOCYTES # (AUTO): 0.8 K/MM3 (ref 0–0.8)
MONOCYTES % (AUTO): 11.7 % (ref 0–7.3)
PLATELET # BLD: 148 K/MM3 (ref 140–440)
RBC # BLD AUTO: 3.99 M/MM3 (ref 3.65–5.03)

## 2021-08-08 PROCEDURE — G0480 DRUG TEST DEF 1-7 CLASSES: HCPCS

## 2021-08-08 PROCEDURE — 80048 BASIC METABOLIC PNL TOTAL CA: CPT

## 2021-08-08 PROCEDURE — 36415 COLL VENOUS BLD VENIPUNCTURE: CPT

## 2021-08-08 PROCEDURE — 85730 THROMBOPLASTIN TIME PARTIAL: CPT

## 2021-08-08 PROCEDURE — 87040 BLOOD CULTURE FOR BACTERIA: CPT

## 2021-08-08 PROCEDURE — C9113 INJ PANTOPRAZOLE SODIUM, VIA: HCPCS

## 2021-08-08 PROCEDURE — 85007 BL SMEAR W/DIFF WBC COUNT: CPT

## 2021-08-08 PROCEDURE — 87641 MR-STAPH DNA AMP PROBE: CPT

## 2021-08-08 PROCEDURE — 80076 HEPATIC FUNCTION PANEL: CPT

## 2021-08-08 PROCEDURE — 85027 COMPLETE CBC AUTOMATED: CPT

## 2021-08-08 PROCEDURE — G0378 HOSPITAL OBSERVATION PER HR: HCPCS

## 2021-08-08 PROCEDURE — 83690 ASSAY OF LIPASE: CPT

## 2021-08-08 PROCEDURE — 85610 PROTHROMBIN TIME: CPT

## 2021-08-08 PROCEDURE — 76770 US EXAM ABDO BACK WALL COMP: CPT

## 2021-08-08 PROCEDURE — 74176 CT ABD & PELVIS W/O CONTRAST: CPT

## 2021-08-08 PROCEDURE — 84100 ASSAY OF PHOSPHORUS: CPT

## 2021-08-08 PROCEDURE — 93005 ELECTROCARDIOGRAM TRACING: CPT

## 2021-08-08 PROCEDURE — 96374 THER/PROPH/DIAG INJ IV PUSH: CPT

## 2021-08-08 PROCEDURE — 83735 ASSAY OF MAGNESIUM: CPT

## 2021-08-08 PROCEDURE — 80320 DRUG SCREEN QUANTALCOHOLS: CPT

## 2021-08-08 PROCEDURE — 71045 X-RAY EXAM CHEST 1 VIEW: CPT

## 2021-08-08 PROCEDURE — 85025 COMPLETE CBC W/AUTO DIFF WBC: CPT

## 2021-08-08 PROCEDURE — 80053 COMPREHEN METABOLIC PANEL: CPT

## 2021-08-08 PROCEDURE — 83036 HEMOGLOBIN GLYCOSYLATED A1C: CPT

## 2021-08-08 RX ADMIN — POTASSIUM CHLORIDE SCH MLS/HR: 10 INJECTION, SOLUTION INTRAVENOUS at 23:40

## 2021-08-08 RX ADMIN — POTASSIUM CHLORIDE SCH MLS/HR: 10 INJECTION, SOLUTION INTRAVENOUS at 21:52

## 2021-08-08 NOTE — EVENT NOTE
ED Screening Note


ED Screening Note: 





pt presents for lower abdominal pain and nausea vomiting that began a week ago


Patient has active vomiting in triage








This initial assessment/diagnostic orders/clinical plan/treatment(s) is/are 

subject to change based on patients health status, clinical progression and re-

assessment by fellow clinical providers in the ED. Further treatment and workup 

at subsequent clinical providers discretion. Patient/guardian urged not to elope

from the ED as their condition may be serious if not clinically assessed and 

managed. 





Initial orders include: 


Labs, CT, urine, meds

## 2021-08-08 NOTE — CAT SCAN REPORT
CT ABDOMEN AND PELVIS WITHOUT CONTRAST



INDICATION / CLINICAL INFORMATION: abd pain + new onset renal failure.



TECHNIQUE: Axial CT images were obtained through the abdomen and pelvis without IV contrast.  All CT 
scans at this location are performed using CT dose reduction for ALARA by means of automated exposure
 control. 



COMPARISON: None available.



FINDINGS:



LOWER CHEST: Moderate size hiatal hernia with mild adjacent inflammation.

LIVER: No significant abnormality.

GALLBLADDER: No significant abnormality.  

BILE DUCTS: No significant abnormality.

PANCREAS: No significant abnormality.

SPLEEN: No significant abnormality.

ADRENALS: No significant abnormality.

RIGHT KIDNEY / URETER: No significant abnormality.

LEFT KIDNEY / URETER: No significant abnormality.



STOMACH / SMALL BOWEL: No significant abnormality. 

COLON: Mild noninflamed colonic diverticulosis. 

APPENDIX: No significant abnormality.  

PERITONEUM: No free fluid. No free air. No fluid collection.

LYMPH NODES: No significant adenopathy.

VASCULAR STRUCTURES: No significant abnormality. 



URINARY BLADDER: No significant abnormality.

REPRODUCTIVE ORGANS: Mild enlargement



ADDITIONAL FINDINGS: Fat-containing left inguinal hernia. Small fat-containing umbilical hernia.



SKELETAL SYSTEM: No significant abnormality.



IMPRESSION:

1. Moderate size hiatal hernia with adjacent inflammation likely representing peptic disease.

2. No renal obstruction.

 



Signer Name: Alec Alcaraz MD 

Signed: 8/8/2021 10:30 PM

Workstation Name: [id number]

## 2021-08-08 NOTE — EMERGENCY DEPARTMENT REPORT
ED General Adult HPI





- General


Chief complaint: Nausea/Vomiting/Diarrhea


Stated complaint: NAUSEA/VOMITING


Time Seen by Provider: 21 19:41


Source: patient


Mode of arrival: Wheelchair


Limitations: Physical Limitation





- History of Present Illness


Initial comments: 


60-year-old male patient with history of hypertension presents to the emergency 

department with vague complaints.  Patient states he has been "blacking out" for

several years.  When asked if anything specific brought him to the emergency 

department today, he states he "blacked out again."  He is unsure what has been 

causing these episodes.  Patient also endorses recurrent headaches.  States he 

takes lisinopril and trazodone.  He has no known history of renal problems.  No 

recent trauma.  On further interrogation, patient also endorses lower abdominal 

pain.  Currently, patient states he is thirsty, and is asking for something to 

drink.  Further history is difficult to ascertain, as patient is a poor 

historian. Please see MDM for review of patient's past medical records. 





Severity scale (0 -10): 3





- Related Data


                                Home Medications











 Medication  Instructions  Recorded  Confirmed  Last Taken


 


DULoxetine [Cymbalta] 30 mg PO DAILY 20 Unknown


 


Apixaban [Eliquis] 5 mg PO Q48HR 20 Unknown


 


Gabapentin 300 mg PO DAILY 20 Unknown


 


Ondansetron [Zofran ODT TAB] 4 mg PO Q8HR 20 Unknown


 


Spironolactone [Aldactone] 25 mg PO QDAY 20 Unknown








                                  Previous Rx's











 Medication  Instructions  Recorded  Last Taken  Type


 


Pantoprazole [Protonix TAB] 40 mg PO BID #60 tablet 19 Unknown Rx


 


Acetaminophen [Acetaminophen TAB] 650 mg PO Q4H PRN  tablet 20 Unknown Rx


 


Magnesium Oxide [Mag-Ox] 400 mg PO QDAY 7 Days #7 tablet 20 Unknown Rx


 


Metoprolol Xl [Metoprolol 50 mg PO QDAY #30 tablet 20 Unknown Rx





SUCCINATE ER TAB]    


 


amLODIPine 10 mg PO QDAY #30 tablet 20 Unknown Rx


 


levETIRAcetam [Keppra] 750 mg PO BID #60 oral.liqd 12/03/20 Unknown Rx


 


oxyCODONE /ACETAMINOPHEN [Percocet 1 tab PO Q6H PRN  tablet 20 Unknown Rx





5/325 mg]    


 


traMADoL [Ultram 50 MG tab] 50 mg PO Q6H PRN #30 tablet 20 Unknown Rx


 


Amiodarone [Cordarone 200 MG TAB] 200 mg PO BID #60 tablet 21 Unknown Rx


 


Folic Acid [Folvite] 1 mg PO QDAY #30 tablet 21 Unknown Rx


 


Thiamine [Vitamin B-1] 100 mg PO QDAY #30 tablet 21 Unknown Rx











                                    Allergies











Allergy/AdvReac Type Severity Reaction Status Date / Time


 


No Known Allergies Allergy   Verified 20 07:33














ED Review of Systems


ROS: 


Stated complaint: NAUSEA/VOMITING


Other details as noted in HPI





Other: 





GENERAL: Negative for fever, chills, weight change, anorexia, fatigue.


ENT: Negative for ear pain, difficulty hearing, sore throat, nasal congestion, 

epistaxis.


CARDIOVASCULAR: Negative for chest pain, palpitations, lower extremity swelling.




PULMONARY: Negative for cough, dyspnea, wheezing, orthopnea, cyanosis. 


GASTROINTESTINAL: Positive for nausea and vomiting.


MUSCULOSKELETAL: Negative for joint pain, joint swelling, myalgias, back pain, 

neck pain. 


NEUROLOGICAL: Positive for headaches and "blackouts." 


INTEGUMENTARY: Negative for erythema, rash, diaphoresis, laceration, ecchymosis.




HEMATOLOGICAL: Negative for hemoptysis, hematemesis, hematochezia, hematuria.


PSYCHIATRIC: Negative for hallucinations, suicidal ideation, homicidal ideation,

anxiety, depression.





ED Past Medical Hx





- Past Medical History


Hx Hypertension: Yes (nonischemic cardiomyopathy, EF 15-20%)


Hx Congestive Heart Failure: Yes


Hx Diabetes: No


Hx Deep Vein Thrombosis:  (unknown)


Hx Renal Disease: No


Hx Arthritis: Yes


Hx Seizures: Yes (last months ago per patient, not on meds)


Hx Asthma: Yes


Hx COPD: No


Additional medical history: VTACH, Defibrillator, Head Trauma, Concussions





- Surgical History


Past Surgical History?: Yes


Hx Pacemaker: Yes


Hx Internal Defibrillator: Yes


Additional Surgical History: JAW, KNEE, HAND





- Social History


Smoking Status: Never Smoker





- Medications


Home Medications: 


                                Home Medications











 Medication  Instructions  Recorded  Confirmed  Last Taken  Type


 


Pantoprazole [Protonix TAB] 40 mg PO BID #60 tablet 19 Unknown Rx


 


DULoxetine [Cymbalta] 30 mg PO DAILY 20 Unknown History


 


Apixaban [Eliquis] 5 mg PO Q48HR 20 Unknown History


 


Gabapentin 300 mg PO DAILY 20 Unknown History


 


Ondansetron [Zofran ODT TAB] 4 mg PO Q8HR 20 Unknown History


 


Spironolactone [Aldactone] 25 mg PO QDAY 20 Unknown History


 


Acetaminophen [Acetaminophen TAB] 650 mg PO Q4H PRN  tablet 20 

Unknown Rx


 


Magnesium Oxide [Mag-Ox] 400 mg PO QDAY 7 Days #7 tablet 20 

Unknown Rx


 


Metoprolol Xl [Metoprolol 50 mg PO QDAY #30 tablet 20 Unknown Rx





SUCCINATE ER TAB]     


 


amLODIPine 10 mg PO QDAY #30 tablet 20 Unknown Rx


 


levETIRAcetam [Keppra] 750 mg PO BID #60 oral.liqd 20 Unknown Rx


 


oxyCODONE /ACETAMINOPHEN [Percocet 1 tab PO Q6H PRN  tablet 20 

Unknown Rx





5/325 mg]     


 


traMADoL [Ultram 50 MG tab] 50 mg PO Q6H PRN #30 tablet 20 

Unknown Rx


 


Amiodarone [Cordarone 200 MG TAB] 200 mg PO BID #60 tablet 21  Unknown Rx


 


Folic Acid [Folvite] 1 mg PO QDAY #30 tablet 21  Unknown Rx


 


Thiamine [Vitamin B-1] 100 mg PO QDAY #30 tablet 21  Unknown Rx














ED Physical Exam





- General


Limitations: Physical Limitation





- Other


Other exam information: 





General: Awake and alert. No acute distress. 


Head: Atraumatic, normocephalic.


Eyes: EOMI. Pupils are equal and round. Normal sclera and conjunctiva. 


ENT: Oral mucosa is moist. Normal pharyngeal exam.


Neck: Supple. No lymphadenopathy.


Pulmonary: No respiratory distress. Clear to auscultation bilaterally. 


Cardiac: Tachycardic. Pulses are palpable and equal bilaterally. No lower extrem

ity cyanosis or edema. 


Skin: Warm and dry. No rashes. 


Abdomen: Soft, non-tender, non-protuberant.  Diffuse lower abdominal tenderness 

without guarding, rigidity, or rebound. Bowel sounds are normal. No organomegaly

or masses noted. 


Back: Normal alignment. No CVA tenderness.


Extremities: Symmetrical. Full range of motion intact. 


Neurological: Alert and oriented, appropriately interactive, no focal deficits.


Psych: Cooperative. Appropriate mood and affect. Speech is evenly metered. 

Thoughts are logically construed.





ED Course


                                   Vital Signs











  21





  19:44


 


Temperature 98.6 F


 


Pulse Rate 109 H


 


Blood Pressure 104/79


 


O2 Sat by Pulse 96





Oximetry 














ED Medical Decision Making





- Lab Data


Result diagrams: 


                                 21 19:52





                                 21 19:52





- Radiology Data





[facility]  


                                     [address] [place] [address]  


 


                                          Cat Scan Report   


                                               Signed  


 


Patient: REJI JJ                                                       

        MR#: [id number]  


[id number]          


: 1960                                                                

Acct:[id number]      


 


Age/Sex: 60 / M                                                                

ADM Date: 21     


 


Loc: ED       


Attending Dr:   


 


 


Ordering Physician: ANTONI CABRERA  


Date of Service: 21  


Procedure(s): CT abdomen pelvis wo con  


Accession Number(s): [id number]  


 


cc: ANTONI CABRERA   


 


 


CT ABDOMEN AND PELVIS WITHOUT CONTRAST  


 


 INDICATION / CLINICAL INFORMATION: abd pain + new onset renal failure.  


 


 TECHNIQUE: Axial CT images were obtained through the abdomen and pelvis without

IV contrast.  All 


CT scans at this location are performed using CT dose reduction for ALARA by 

means of automated 


exposure control.   


 


 COMPARISON: None available.  


 


 FINDINGS:  


 


 LOWER CHEST: Moderate size hiatal hernia with mild adjacent inflammation.  


 LIVER: No significant abnormality.  


 GALLBLADDER: No significant abnormality.    


 BILE DUCTS: No significant abnormality.  


 PANCREAS: No significant abnormality.  


 SPLEEN: No significant abnormality.  


 ADRENALS: No significant abnormality.  


 RIGHT KIDNEY / URETER: No significant abnormality.  


 LEFT KIDNEY / URETER: No significant abnormality.  


 


 STOMACH / SMALL BOWEL: No significant abnormality.   


 COLON: Mild noninflamed colonic diverticulosis.   


 APPENDIX: No significant abnormality.    


 PERITONEUM: No free fluid. No free air. No fluid collection.  


 LYMPH NODES: No significant adenopathy.  


 VASCULAR STRUCTURES: No significant abnormality.   


 


 URINARY BLADDER: No significant abnormality.  


 REPRODUCTIVE ORGANS: Mild enlargement  


 


 ADDITIONAL FINDINGS: Fat-containing left inguinal hernia. Small fat-containing 

umbilical hernia.  


 


 SKELETAL SYSTEM: No significant abnormality.  


 


 IMPRESSION:  


 1. Moderate size hiatal hernia with adjacent inflammation likely representing 

peptic disease.  


 2. No renal obstruction.  


 


 


 Signer Name: Alec Alcaraz MD   


 Signed: 2021 10:30 PM  


 Workstation Name: [id number]   


 


 


Transcribed By: ES  


Dictated By: Alec Alcaraz MD  


Electronically Authenticated By: Alec Alcaraz MD    


Signed Date/Time: 21                                


 


 


 


DD/DT: 21                                                            

 


TD/TT:





- Medical Decision Making


Differential diagnosis including but not limited to: dehydration, electrolyte 

abnormality, anemia, hypoglycemia, alcohol withdrawal, delirium tremens, renal 

failure, uremia





Review of past medical records indicates patient was hospitalized at this 

facility earlier in the year for evaluation and management of the following 

conditions: atrial fibrillation with rapid ventricular response, acute kidney 

injury, alcohol withdrawal, hypotension, syncope, hyponatremia, hypokalemia, 

type II NSTEMI, and cardiomyopathy.





On reevaluation, patient remains stable, although he is now vomiting.  Ordered 

Zofran.  Labs show hypokalemia, hyponatremia, hypomagnesemia, and significant 

decrease in renal function as compared with prior labs earlier this year.  Blood

alcohol level is 0.17.  Potassium and magnesium replenished conservatively in 

the emergency department, as patient's renal function is compromised.  EKG is 

pending.  CT of the abdomen/pelvis obtained for further evaluation of possible 

post renal obstruction without IV contrast due to risk of nephropathy.  No post 

renal obstruction identified.  Case discussed with Dr. Quintero, nephrologist, who

recommended maintenance IV fluids at 125 mL per hour and hospital admission for 

further evaluation and management. Ordered renal ultrasound and repeat BMP for 

the morning per nephrologist recommendation.  Case discussed with hospitalist, 

who agrees to admit.





Case discussed with Dr. Shanks, attending emergency physician, who agrees with 

diagnostic work-up/plan of care.


Critical care attestation.: 


If time is entered above; I have spent that time in minutes in the direct care 

of this critically ill patient, excluding procedure time.








ED Disposition


Clinical Impression: 


 Hypokalemia





Renal failure


Qualifiers:


 Renal failure chronicity: unspecified chronicity Qualified Code(s): N19 - 

Unspecified kidney failure





Alcohol intoxication


Qualifiers:


 Complication of substance-induced condition: with unspecified complication 

Qualified Code(s): F10.929 - Alcohol use, unspecified with intoxication, 

unspecified





Disposition: DC-09 OP ADMIT IP TO THIS HOSP


Is pt being admited?: Yes


Does the pt Need Aspirin: No


Condition: Serious


Referrals: 


PRIMARY CARE,MD [Primary Care Provider] - 3-5 Days


Time of Disposition: 23:33

## 2021-08-09 LAB
ALBUMIN SERPL-MCNC: 3.9 G/DL (ref 3.9–5)
ALT SERPL-CCNC: 27 UNITS/L (ref 7–56)
BILIRUB DIRECT SERPL-MCNC: 0.5 MG/DL (ref 0–0.2)
BUN SERPL-MCNC: 23 MG/DL (ref 9–20)
BUN/CREAT SERPL: 5 %
CALCIUM SERPL-MCNC: 8.6 MG/DL (ref 8.4–10.2)
HEMOLYSIS INDEX: 0

## 2021-08-09 RX ADMIN — METOCLOPRAMIDE SCH MG: 5 INJECTION, SOLUTION INTRAMUSCULAR; INTRAVENOUS at 16:09

## 2021-08-09 RX ADMIN — LEVETIRACETAM SCH MG: 500 SOLUTION ORAL at 22:14

## 2021-08-09 RX ADMIN — POTASSIUM CHLORIDE SCH MLS/HR: 10 INJECTION, SOLUTION INTRAVENOUS at 05:33

## 2021-08-09 RX ADMIN — POTASSIUM CHLORIDE SCH MLS/HR: 10 INJECTION, SOLUTION INTRAVENOUS at 17:31

## 2021-08-09 RX ADMIN — ONDANSETRON PRN MG: 2 INJECTION INTRAMUSCULAR; INTRAVENOUS at 02:49

## 2021-08-09 RX ADMIN — AMIODARONE HYDROCHLORIDE SCH: 200 TABLET ORAL at 22:19

## 2021-08-09 RX ADMIN — POTASSIUM CHLORIDE SCH: 10 INJECTION, SOLUTION INTRAVENOUS at 15:33

## 2021-08-09 RX ADMIN — AMIODARONE HYDROCHLORIDE SCH MG: 200 TABLET ORAL at 14:54

## 2021-08-09 RX ADMIN — METOCLOPRAMIDE SCH: 5 INJECTION, SOLUTION INTRAMUSCULAR; INTRAVENOUS at 18:36

## 2021-08-09 RX ADMIN — PANTOPRAZOLE SODIUM SCH MG: 40 INJECTION, POWDER, FOR SOLUTION INTRAVENOUS at 22:14

## 2021-08-09 RX ADMIN — LEVETIRACETAM SCH MG: 500 SOLUTION ORAL at 12:48

## 2021-08-09 RX ADMIN — METOPROLOL SUCCINATE SCH: 50 TABLET, EXTENDED RELEASE ORAL at 18:20

## 2021-08-09 RX ADMIN — POTASSIUM CHLORIDE SCH MLS/HR: 10 INJECTION, SOLUTION INTRAVENOUS at 18:34

## 2021-08-09 RX ADMIN — Medication SCH MG: at 21:05

## 2021-08-09 RX ADMIN — Medication SCH: at 15:41

## 2021-08-09 RX ADMIN — Medication SCH ML: at 22:18

## 2021-08-09 RX ADMIN — SUCRALFATE ORAL SCH: 1 SUSPENSION ORAL at 20:03

## 2021-08-09 RX ADMIN — PANTOPRAZOLE SODIUM SCH MG: 40 INJECTION, POWDER, FOR SOLUTION INTRAVENOUS at 16:09

## 2021-08-09 RX ADMIN — APIXABAN SCH MG: 5 TABLET, FILM COATED ORAL at 12:47

## 2021-08-09 RX ADMIN — POTASSIUM CHLORIDE SCH: 10 INJECTION, SOLUTION INTRAVENOUS at 15:31

## 2021-08-09 RX ADMIN — POTASSIUM CHLORIDE SCH MLS/HR: 10 INJECTION, SOLUTION INTRAVENOUS at 08:36

## 2021-08-09 RX ADMIN — POTASSIUM CHLORIDE SCH MLS/HR: 10 INJECTION, SOLUTION INTRAVENOUS at 16:30

## 2021-08-09 RX ADMIN — SCOPALAMINE SCH EACH: 1 PATCH, EXTENDED RELEASE TRANSDERMAL at 14:54

## 2021-08-09 RX ADMIN — Medication SCH ML: at 16:40

## 2021-08-09 RX ADMIN — POTASSIUM CHLORIDE SCH MLS/HR: 10 INJECTION, SOLUTION INTRAVENOUS at 20:02

## 2021-08-09 NOTE — EVENT NOTE
Date: 08/09/21


Received phone call from laboratory regarding potassium of 2.6. Patient is 

admitted to hospitalist service.

## 2021-08-09 NOTE — CONSULTATION
History of Present Illness





- Reason for Consult


Consult date: 08/09/21


acute renal failure, hypokalemia





- History of Present Illness





This is a 58-year-old man who presented nausea, vomiting and abdominal pain.  

Patient has past medical history of hypertension, chronic alcohol abuse, seizure

disorder on Keppra, A. fibon BB and Eliquis, congestive heart failure, 

cardiomegalypatient has history of ICD shock history of dual-chamber ICD.  CT 

of the abdomen shows hiatal hernia with inflammation.  





Blood work in ED shows potassium 2.4 and magnesium 1.3; HERMELINDA with creatinine 4.8.

 Patient with similar presentations to [facility] in past, with HERMELINDA that improved with

IVF and lyte replacement, likely due to pre-renal injuries in setting of alcohol

use and subsequent nausea/vomiting.  





This AM, patient notes ongoing abdominal pain, nausea, vomiting.  Not urinating 

much per his report.  States that his legs feel numb.  





Past History


Past Medical History: diabetes, hypertension, other (Afib on NOAC)


Past Surgical History: Other (Knee surgery, abdominal surgery - does not 

remember)


Social history: lives with family, alcohol abuse


Family history: no significant family history





Medications and Allergies


                                    Allergies











Allergy/AdvReac Type Severity Reaction Status Date / Time


 


No Known Allergies Allergy   Verified 12/01/20 07:33











                                Home Medications











 Medication  Instructions  Recorded  Confirmed  Last Taken  Type


 


Pantoprazole [Protonix TAB] 40 mg PO BID #60 tablet 11/07/19 04/03/21 Unknown Rx


 


DULoxetine [Cymbalta] 30 mg PO DAILY 09/12/20 04/03/21 Unknown History


 


Apixaban [Eliquis] 5 mg PO Q48HR 12/01/20 04/03/21 Unknown History


 


Gabapentin 300 mg PO DAILY 12/01/20 04/03/21 Unknown History


 


Ondansetron [Zofran ODT TAB] 4 mg PO Q8HR 12/01/20 04/03/21 Unknown History


 


Spironolactone [Aldactone] 25 mg PO QDAY 12/01/20 04/03/21 Unknown History


 


Acetaminophen [Acetaminophen TAB] 650 mg PO Q4H PRN  tablet 12/03/20 04/03/21 

Unknown Rx


 


Magnesium Oxide [Mag-Ox] 400 mg PO QDAY 7 Days #7 tablet 12/03/20 04/03/21 

Unknown Rx


 


Metoprolol Xl [Metoprolol 50 mg PO QDAY #30 tablet 12/03/20 04/03/21 Unknown Rx





SUCCINATE ER TAB]     


 


amLODIPine 10 mg PO QDAY #30 tablet 12/03/20 04/03/21 Unknown Rx


 


levETIRAcetam [Keppra] 750 mg PO BID #60 oral.liqd 12/03/20 04/03/21 Unknown Rx


 


oxyCODONE /ACETAMINOPHEN [Percocet 1 tab PO Q6H PRN  tablet 12/03/20 04/03/21 

Unknown Rx





5/325 mg]     


 


traMADoL [Ultram 50 MG tab] 50 mg PO Q6H PRN #30 tablet 12/03/20 04/03/21 

Unknown Rx


 


Amiodarone [Cordarone 200 MG TAB] 200 mg PO BID #60 tablet 04/06/21  Unknown Rx


 


Folic Acid [Folvite] 1 mg PO QDAY #30 tablet 04/06/21  Unknown Rx


 


Thiamine [Vitamin B-1] 100 mg PO QDAY #30 tablet 04/06/21  Unknown Rx











Active Meds: 


Active Medications





Acetaminophen (Acetaminophen 325 Mg Tab)  650 mg PO Q4H PRN


   PRN Reason: Pain MILD(1-3)/Fever >100.5/HA


Amiodarone HCl (Amiodarone 200 Mg Tab)  200 mg PO BID [facility]


Amlodipine Besylate (Amlodipine 10 Mg Tab)  10 mg PO QDAY [facility]


Apixaban (Apixaban 5 Mg Tab)  5 mg PO Q48HR [facility]


Duloxetine HCl (Duloxetine 30 Mg Cap)  30 mg PO DAILY [facility]


Folic Acid (Folic Acid 1 Mg Tab)  1 mg PO QDAY [facility]


Gabapentin (Gabapentin 300 Mg Cap)  300 mg PO DAILY [facility]


Haloperidol Lactate (Haloperidol Lactate 5 Mg/1 Ml Inj)  5 mg IV Q1H PRN


   PRN Reason: Unrespon. to mult. doses BZD's


Sodium Chloride (Nacl 0.9% 1000 Ml)  1,000 mls @ 125 mls/hr IV AS DIRECT [facility]


Thiamine HCl 100 mg/ Folic Acid 1 mg/ Multivitamins/Minerals 10 ml/ Sodium 

Chloride  1,011.2 mls @ 125 mls/hr IV ONCE ONE


   Stop: 08/09/21 20:35


Potassium Chloride (Kcl 10meq/100ml)  10 meq in 100 mls @ 100 mls/hr IV Q1H [facility]


   Stop: 08/09/21 15:59


Levetiracetam (Levetiracetam 500 Mg/5 Ml Oral Liqd)  750 mg PO BID [facility]


Lorazepam (Lorazepam 2 Mg/Ml Vial)  2 mg IV Q1H PRN


   PRN Reason: CIWA-Ar 8-15


Magnesium Oxide (Magnesium Oxide 400 Mg Tab)  400 mg PO ONCE ONE


   Stop: 08/09/21 23:22


Metoclopramide HCl (Metoclopramide 10 Mg/2 Ml Inj)  5 mg IV Q6H [facility]


Metoprolol Succinate (Metoprolol Succinate Xl 50 Mg Tab)  50 mg PO QDAY@0800 [facility]


Morphine Sulfate (Morphine 2 Mg/1 Ml Inj)  2 mg IV Q4H PRN


   PRN Reason: Pain , Severe (7-10)


Naloxone HCl (Naloxone 0.4 Mg/1 Ml Inj)  0.1 mg IV Q2MIN PRN


   PRN Reason: Res Rate </= 8 or 02 SAT < 92%


Ondansetron HCl (Ondansetron 4 Mg/2 Ml Inj)  4 mg IV Q8H PRN


   PRN Reason: Nausea And Vomiting


   Last Admin: 08/09/21 02:49 Dose:  4 mg


   Documented by: 


Pantoprazole Sodium (Pantoprazole 40 Mg Inj)  40 mg IV BID [facility]


Promethazine HCl (Promethazine 12.5 Mg Rect Supp)  12.5 mg NE ONCE [facility]


   Stop: 08/09/21 14:00


   Last Admin: 08/09/21 12:15 Dose:  12.5 mg


   Documented by: 


Scopolamine (Scopolamine Transdermal Patch 72 Hr)  1 each TD Q3D [facility]


Sodium Chloride (Sodium Chloride 0.9% 10 Ml Flush Syringe)  10 ml IV BID [facility]


Sodium Chloride (Sodium Chloride 0.9% 10 Ml Flush Syringe)  10 ml IV PRN PRN


   PRN Reason: LINE FLUSH


Spironolactone (Spironolactone 25 Mg Tab)  25 mg PO QDAY [facility]











Review of Systems


Constitutional: weakness, no fever


Ears, nose, mouth and throat: deferred


Cardiovascular: no chest pain, no palpitations, no edema, no shortness of breath


Respiratory: no cough, no shortness of breath, no dyspnea on exertion


Gastrointestinal: abdominal pain, nausea, vomiting, no diarrhea, no constipation


Genitourinary Male: no dysuria, no hematuria


Musculoskeletal: leg numbness/tingling, muscle weakness


Integumentary: no rash


Neurological: no head injury, no headaches


Psychiatric: anxiety





Exam





- Vital Signs


Vital signs: 


                                   Vital Signs











Temp Pulse BP Pulse Ox


 


 98.6 F   109 H  104/79   96 


 


 08/08/21 19:44  08/08/21 19:44  08/08/21 19:44  08/08/21 19:44














- General Appearance


General appearance: well-developed, well-nourished, moderate distress


EENT: ATNC


Neck: Present: neck supple


Respiratory: Clear to Ascultation


Heart: regular, S1S2


Gastrointestinal: Present: normoactive bowel sounds, tenderness


Integumentary: no rash, warm and dry


Neurologic: no focal deficit, alert and oriented x3


Psychiatric: mood/affect appropriate





Results





- Lab Results





                                 08/08/21 19:52





                                 08/09/21 05:30


                             Most recent lab results











Calcium  8.6 mg/dL (8.4-10.2)   08/09/21  05:30    


 


Phosphorus  2.50 mg/dL (2.5-4.5)   08/08/21  21:02    


 


Magnesium  1.00 mg/dL (1.7-2.3)  L  08/09/21  05:30    














Assessment and Plan





# Acute Kidney Injury: suspect pre-renal injury with dehydration, alcohol use.  

Similar presentation in past, with creatinine peaking to 4.9 before improving to

1.1 with IVF.  Creatinine 4.6->4.8 on admission


- IVF as tolerated


- reviewed prior workup for imaging, serologies; ultrasound WNL


- strict Is/Os 


- replete lytes aggressively, IVF as tolerated, continue current orders 


- renally dose meds


- avoid nephrotoxins- consider alternative to PPI if able given AIN risk


- no immediate indication for renal replacement therapy or biopsy


- hold MRA for now given HERMELINDA, hypotension





# Hypokalemia: due to alcohol use, replete 





# Hypomagnesemia: due to above, replete, agree with banana bag





# Metabolic Alkalosis: due to vomiting, contraction





# Hyponatremia: due to alcohol use, dehydration, improving with IVF





# Abdominal pain: reviewed CT which shows hiatal hernia, appreciate surgery 

input





# Alcohol abuse





# A-Fib/Tachycardia: on BB





# CHF: note prior echo, hold MRA for now, monitor volume status with IVF 





# HTN: BP soft this AM, monitor closely given HERMELINDA

## 2021-08-09 NOTE — EVENT NOTE
Date: 08/09/21





This is a follow-up of an admission earlier this morning.  Patient seen and 

examined.  We will continue the plan as outlined in H&P.  Appreciate surgery 

recommendations.  Advance diet as tolerated.  Total visit time equals 35 minutes

with greater than 50% spent on coordination of care and counseling

## 2021-08-09 NOTE — GASTROENTEROLOGY CONSULTATION
History of Present Illness





- Reason for Consult


Consult date: 08/09/21


abdominal pain


Requesting physician: ART BHATT





- History of Present Illness





This is a 61 yo male with pmh of chronic alcohol abuse, seizure disorder, afib 

on eliquis, CHF, EF 35%, dual chamber ICD, and chronic abdominal pain with 

nausea/vomiting admitted for renal failure. GI consulted for abdominal pain. 


Review of records from [facility] and [facility] showed multiple admissions for abdominal 

pain and nausea/vomiting. Multiple EGDs in 2019, 2020, and most recently at [facility] 

in 06/2021 showing esophagitis, Olivarez's esophagus, and hiatal hernia. Other 

prior work up including HIDA scan negative and MRCP in 05/2020 without any CBD 

stones. 


He reports worsening symptoms of LUQ and left lower abdominal pain along with 

nausea/vomiting. Also complains of migraine HA and feels weak. No bleeding sympt

oms including blood in the stools or melena. Reports daily alcohol intake. 





In the ED,  CT of the abdomen is donepositive for hiatal hernia with 

inflammation.  Blood work done in ED showed that patient is in acute renal 

failure.  Potassium 2.4 and magnesium 1.3.





EGD at [facility] in 06/24/2021


1. GERD


2. Esophagitis


3. Hiatal Hernia


4. Long Segment Olivarez's Esophagus








MRCP 5/2020


1. Mild prominence of the common bile duct measuring up to 8 mm,


similar to prior remote CT from 2018. Distally the common bile duct


tapers smoothly at the level of the ampulla. No evidence of


choledocholithiasis. No intrahepatic biliary ductal dilatation.





2. Small amount of free fluid within the abdomen.





3. Small bilateral pleural effusions.





4. Gallbladder is thin-walled without evidence of gallstones.





5. Small hiatal hernia.








Obtained/updated/reviewed patient's current medications





Past History


Past Medical History: diabetes, hypertension, other (Afib on NOAC)


Past Surgical History: Other (Knee surgery, abdominal surgery - does not 

remember)


Social history: lives with family, alcohol abuse


Family history: no significant family history





Medications and Allergies


                                    Allergies











Allergy/AdvReac Type Severity Reaction Status Date / Time


 


No Known Allergies Allergy   Verified 12/01/20 07:33











                                Home Medications











 Medication  Instructions  Recorded  Confirmed  Last Taken  Type


 


Pantoprazole [Protonix TAB] 40 mg PO BID #60 tablet 11/07/19 04/03/21 Unknown Rx


 


DULoxetine [Cymbalta] 30 mg PO DAILY 09/12/20 04/03/21 Unknown History


 


Apixaban [Eliquis] 5 mg PO Q48HR 12/01/20 04/03/21 Unknown History


 


Gabapentin 300 mg PO DAILY 12/01/20 04/03/21 Unknown History


 


Ondansetron [Zofran ODT TAB] 4 mg PO Q8HR 12/01/20 04/03/21 Unknown History


 


Spironolactone [Aldactone] 25 mg PO QDAY 12/01/20 04/03/21 Unknown History


 


Acetaminophen [Acetaminophen TAB] 650 mg PO Q4H PRN  tablet 12/03/20 04/03/21 

Unknown Rx


 


Magnesium Oxide [Mag-Ox] 400 mg PO QDAY 7 Days #7 tablet 12/03/20 04/03/21 

Unknown Rx


 


Metoprolol Xl [Metoprolol 50 mg PO QDAY #30 tablet 12/03/20 04/03/21 Unknown Rx





SUCCINATE ER TAB]     


 


amLODIPine 10 mg PO QDAY #30 tablet 12/03/20 04/03/21 Unknown Rx


 


levETIRAcetam [Keppra] 750 mg PO BID #60 oral.liqd 12/03/20 04/03/21 Unknown Rx


 


oxyCODONE /ACETAMINOPHEN [Percocet 1 tab PO Q6H PRN  tablet 12/03/20 04/03/21 

Unknown Rx





5/325 mg]     


 


traMADoL [Ultram 50 MG tab] 50 mg PO Q6H PRN #30 tablet 12/03/20 04/03/21 

Unknown Rx


 


Amiodarone [Cordarone 200 MG TAB] 200 mg PO BID #60 tablet 04/06/21  Unknown Rx


 


Folic Acid [Folvite] 1 mg PO QDAY #30 tablet 04/06/21  Unknown Rx


 


Thiamine [Vitamin B-1] 100 mg PO QDAY #30 tablet 04/06/21  Unknown Rx











Active Meds: 


Active Medications





Acetaminophen (Acetaminophen 325 Mg Tab)  650 mg PO Q4H PRN


   PRN Reason: Pain MILD(1-3)/Fever >100.5/HA


Amiodarone HCl (Amiodarone 200 Mg Tab)  200 mg PO BID [facility]


Amlodipine Besylate (Amlodipine 10 Mg Tab)  10 mg PO QDAY [facility]


Apixaban (Apixaban 5 Mg Tab)  5 mg PO Q48HR [facility]


   Last Admin: 08/09/21 12:47 Dose:  5 mg


   Documented by: 


Duloxetine HCl (Duloxetine 30 Mg Cap)  30 mg PO DAILY [facility]


   Last Admin: 08/09/21 12:47 Dose:  30 mg


   Documented by: 


Folic Acid (Folic Acid 1 Mg Tab)  1 mg PO QDAY [facility]


   Last Admin: 08/09/21 12:49 Dose:  1 mg


   Documented by: 


Gabapentin (Gabapentin 300 Mg Cap)  300 mg PO DAILY [facility]


   Last Admin: 08/09/21 12:48 Dose:  300 mg


   Documented by: 


Haloperidol Lactate (Haloperidol Lactate 5 Mg/1 Ml Inj)  5 mg IV Q1H PRN


   PRN Reason: Unrespon. to mult. doses BZD's


Sodium Chloride (Nacl 0.9% 1000 Ml)  1,000 mls @ 125 mls/hr IV AS DIRECT [facility]


Thiamine HCl 100 mg/ Folic Acid 1 mg/ Multivitamins/Minerals 10 ml/ Sodium 

Chloride  1,011.2 mls @ 125 mls/hr IV ONCE ONE


   Stop: 08/09/21 20:35


Potassium Chloride (Kcl 10meq/100ml)  10 meq in 100 mls @ 100 mls/hr IV Q1H [facility]


   Stop: 08/09/21 15:59


Levetiracetam (Levetiracetam 500 Mg/5 Ml Oral Liqd)  750 mg PO BID [facility]


   Last Admin: 08/09/21 12:48 Dose:  750 mg


   Documented by: 


Lorazepam (Lorazepam 2 Mg/Ml Vial)  2 mg IV Q1H PRN


   PRN Reason: CIWA-Ar 8-15


Magnesium Oxide (Magnesium Oxide 400 Mg Tab)  400 mg PO ONCE ONE


   Stop: 08/09/21 23:22


Metoclopramide HCl (Metoclopramide 10 Mg/2 Ml Inj)  5 mg IV Q6H [facility]


Metoprolol Succinate (Metoprolol Succinate Xl 50 Mg Tab)  50 mg PO QDAY@0800 [facility]


Morphine Sulfate (Morphine 2 Mg/1 Ml Inj)  2 mg IV Q4H PRN


   PRN Reason: Pain , Severe (7-10)


Naloxone HCl (Naloxone 0.4 Mg/1 Ml Inj)  0.1 mg IV Q2MIN PRN


   PRN Reason: Res Rate </= 8 or 02 SAT < 92%


Ondansetron HCl (Ondansetron 4 Mg/2 Ml Inj)  4 mg IV Q8H PRN


   PRN Reason: Nausea And Vomiting


   Last Admin: 08/09/21 02:49 Dose:  4 mg


   Documented by: 


Pantoprazole Sodium (Pantoprazole 40 Mg Inj)  40 mg IV BID [facility]


Scopolamine (Scopolamine Transdermal Patch 72 Hr)  1 each TD Q3D [facility]


Sodium Chloride (Sodium Chloride 0.9% 10 Ml Flush Syringe)  10 ml IV BID [facility]


Sodium Chloride (Sodium Chloride 0.9% 10 Ml Flush Syringe)  10 ml IV PRN PRN


   PRN Reason: LINE FLUSH











Review of Systems





- Review of Systems


All systems: negative


Constitutional: no weight loss, no weight gain, no fever, no chills


Ears, Nose, Throat: difficulty swallowing


Cardiovascular: chest pain


Gastrointestinal: abdominal pain, nausea, vomiting, no diarrhea, no 

constipation, no hematemesis, no coffee ground emesis, no melena, no 

hematochezia


Neurological: weakness


Psychiatric: anxiety





Exam





- Constitutional


Vital Signs: 


                                        











Temp Pulse Resp BP Pulse Ox


 


 98.4 F   104 H  18   155/103   99 


 


 08/09/21 08:08  08/09/21 08:08  08/09/21 08:08  08/09/21 08:08  08/09/21 08:08











General appearance: no acute distress





- EENT


Eyes: EOM intact


ENT: hearing intact





- Respiratory


Respiratory effort: normal





- Cardiovascular


Rhythm: irregularly irregular


Heart Sounds: Present: S1 & S2





- Gastrointestinal


General gastrointestinal: Present: soft, non-tender, non-distended





- Integumentary


Integumentary: Present: clear, warm





- Neurologic


Neurological: alert and oriented x3





- Labs


CBC & Chem 7: 


                                 08/08/21 19:52





                                 08/09/21 05:30


Lab Results: 


                         Laboratory Results - last 24 hr











  08/08/21 08/08/21 08/08/21





  19:52 19:52 21:02


 


WBC  6.6  


 


RBC  3.99  


 


Hgb  11.4 L  


 


Hct  34.1 L  


 


MCV  85  


 


MCH  29  


 


MCHC  33  


 


RDW  20.4 H  


 


Plt Count  148  


 


Lymph % (Auto)  8.8 L  


 


Mono % (Auto)  11.7 H  


 


Eos % (Auto)  0.0  


 


Baso % (Auto)  0.3  


 


Lymph # (Auto)  0.6 L  


 


Mono # (Auto)  0.8  


 


Eos # (Auto)  0.0  


 


Baso # (Auto)  0.0  


 


Seg Neutrophils %  79.2 H  


 


Seg Neutrophils #  5.2  


 


PT   


 


INR   


 


APTT   


 


Sodium   129 L 


 


Potassium   2.4 L* 


 


Chloride   78.0 L 


 


Carbon Dioxide   28 


 


Anion Gap   25 


 


BUN   21 H 


 


Creatinine   4.6 H 


 


Estimated GFR   16 


 


BUN/Creatinine Ratio   5 


 


Glucose   155 H 


 


Calcium   8.8 


 


Phosphorus   


 


Magnesium    1.30 L


 


Total Bilirubin   1.20 


 


Direct Bilirubin   


 


Indirect Bilirubin   


 


AST   61 H 


 


ALT   26 


 


Alkaline Phosphatase   128 


 


Total Protein   7.6 


 


Albumin   4.1 


 


Albumin/Globulin Ratio   1.1 


 


Lipase   20 


 


Plasma/Serum Alcohol   














  08/08/21 08/08/21 08/08/21





  21:02 21:02 21:02


 


WBC   


 


RBC   


 


Hgb   


 


Hct   


 


MCV   


 


MCH   


 


MCHC   


 


RDW   


 


Plt Count   


 


Lymph % (Auto)   


 


Mono % (Auto)   


 


Eos % (Auto)   


 


Baso % (Auto)   


 


Lymph # (Auto)   


 


Mono # (Auto)   


 


Eos # (Auto)   


 


Baso # (Auto)   


 


Seg Neutrophils %   


 


Seg Neutrophils #   


 


PT  13.9  


 


INR  1.02  


 


APTT  28.6  


 


Sodium   


 


Potassium   


 


Chloride   


 


Carbon Dioxide   


 


Anion Gap   


 


BUN   


 


Creatinine   


 


Estimated GFR   


 


BUN/Creatinine Ratio   


 


Glucose   


 


Calcium   


 


Phosphorus    2.50


 


Magnesium   


 


Total Bilirubin   


 


Direct Bilirubin   


 


Indirect Bilirubin   


 


AST   


 


ALT   


 


Alkaline Phosphatase   


 


Total Protein   


 


Albumin   


 


Albumin/Globulin Ratio   


 


Lipase   


 


Plasma/Serum Alcohol   0.18 H 














  08/09/21





  05:30


 


WBC 


 


RBC 


 


Hgb 


 


Hct 


 


MCV 


 


MCH 


 


MCHC 


 


RDW 


 


Plt Count 


 


Lymph % (Auto) 


 


Mono % (Auto) 


 


Eos % (Auto) 


 


Baso % (Auto) 


 


Lymph # (Auto) 


 


Mono # (Auto) 


 


Eos # (Auto) 


 


Baso # (Auto) 


 


Seg Neutrophils % 


 


Seg Neutrophils # 


 


PT 


 


INR 


 


APTT 


 


Sodium  132 L


 


Potassium  2.6 L*


 


Chloride  81.1 L


 


Carbon Dioxide  31 H


 


Anion Gap  23


 


BUN  23 H


 


Creatinine  4.8 H


 


Estimated GFR  15


 


BUN/Creatinine Ratio  5


 


Glucose  125 H


 


Calcium  8.6


 


Phosphorus 


 


Magnesium  1.00 L


 


Total Bilirubin  1.50 H


 


Direct Bilirubin  0.5 H


 


Indirect Bilirubin  1.0


 


AST  68 H


 


ALT  27


 


Alkaline Phosphatase  120


 


Total Protein  6.8


 


Albumin  3.9


 


Albumin/Globulin Ratio  1.3


 


Lipase 


 


Plasma/Serum Alcohol 














- Imaging


CT Scan: report reviewed





Assessment and Plan











This is a 61 yo male with pmh of chronic alcohol abuse, seizure disorder, afib 

on eliquis, CHF, EF 35%, dual chamber ICD, and chronic abdominal pain with 

nausea/vomiting admitted for renal failure. GI consulted for abdominal pain. 





# acute on chronic abdominal pain. 





- most recent EGD at [facility] in 06/2021 showing esophagitis, hiatal hernia, and long

segment Olivarez's esophagus. Multiple prior EGDs showing esophagitis, hiatal 

hernia, and BE.  


- worsening symptoms over past few days. 


- persistent alcohol use. 


- unclear if he has been compliant with outpatient medications. 


- suspect due to esophagitis, gastritis in the setting of hiatal hernia which 

may be contributing to his chronic nausea/vomiting. 


- prior biliary work up with HIDA scan was normal. 





Rec


- cont with PPI bid


- add sucralfate TID


- reglan prn for nausea. 


- conservative management


- supportive care

## 2021-08-09 NOTE — CONSULTATION
History of Present Illness


Consult date: 08/09/21


Reason for consult: abdominal pain


Chief complaint: 





abd pain





- History of present illness


History of present illness: 





59 yo M with hx of HTN, Afib, PPM who presents to Er with 1 week history of LUQ 

abd pain. Pain is characterized as sometimes sharp and sometimes cramping. It 

radiates across to the mid upper abdomen. Pt states he has been drinking daily 

and does not measure how much he drinks. He states his etoh intake has increased

significantly due to family issues recently. He has never had pain this intense 

before. He denies any food related symptoms. He is having nausea and had 

episodes of emesis. However he continuously is asking to have water to drink 

despite stating that he is very nauseated. No f/c, cp, sob. He is having flatus.

He does not have a known hx of PUD. He has had a cscope in the past. ER w/u 

revealed hiatal hernia, elevated etoh levels, several electrolyte abnormalities,

and HERMELINDA.





Past History


Past Medical History: diabetes, hypertension, other (Afib on NOAC)


Past Surgical History: Other (Knee surgery, abdominal surgery - does not 

remember)


Social history: lives with family, alcohol abuse


Family history: no significant family history





Medications and Allergies


                                    Allergies











Allergy/AdvReac Type Severity Reaction Status Date / Time


 


No Known Allergies Allergy   Verified 12/01/20 07:33











                                Home Medications











 Medication  Instructions  Recorded  Confirmed  Last Taken  Type


 


Pantoprazole [Protonix TAB] 40 mg PO BID #60 tablet 11/07/19 04/03/21 Unknown Rx


 


DULoxetine [Cymbalta] 30 mg PO DAILY 09/12/20 04/03/21 Unknown History


 


Apixaban [Eliquis] 5 mg PO Q48HR 12/01/20 04/03/21 Unknown History


 


Gabapentin 300 mg PO DAILY 12/01/20 04/03/21 Unknown History


 


Ondansetron [Zofran ODT TAB] 4 mg PO Q8HR 12/01/20 04/03/21 Unknown History


 


Spironolactone [Aldactone] 25 mg PO QDAY 12/01/20 04/03/21 Unknown History


 


Acetaminophen [Acetaminophen TAB] 650 mg PO Q4H PRN  tablet 12/03/20 04/03/21 

Unknown Rx


 


Magnesium Oxide [Mag-Ox] 400 mg PO QDAY 7 Days #7 tablet 12/03/20 04/03/21 

Unknown Rx


 


Metoprolol Xl [Metoprolol 50 mg PO QDAY #30 tablet 12/03/20 04/03/21 Unknown Rx





SUCCINATE ER TAB]     


 


amLODIPine 10 mg PO QDAY #30 tablet 12/03/20 04/03/21 Unknown Rx


 


levETIRAcetam [Keppra] 750 mg PO BID #60 oral.liqd 12/03/20 04/03/21 Unknown Rx


 


oxyCODONE /ACETAMINOPHEN [Percocet 1 tab PO Q6H PRN  tablet 12/03/20 04/03/21 

Unknown Rx





5/325 mg]     


 


traMADoL [Ultram 50 MG tab] 50 mg PO Q6H PRN #30 tablet 12/03/20 04/03/21 

Unknown Rx


 


Amiodarone [Cordarone 200 MG TAB] 200 mg PO BID #60 tablet 04/06/21  Unknown Rx


 


Folic Acid [Folvite] 1 mg PO QDAY #30 tablet 04/06/21  Unknown Rx


 


Thiamine [Vitamin B-1] 100 mg PO QDAY #30 tablet 04/06/21  Unknown Rx











Active Meds: 


Active Medications





Acetaminophen (Acetaminophen 325 Mg Tab)  650 mg PO Q4H PRN


   PRN Reason: Pain MILD(1-3)/Fever >100.5/HA


Hydrocodone Bitart/Acetaminophen (Hydrocodone/Acetaminophen 5-325 Mg Tab)  2 

each PO Q6H PRN


   PRN Reason: Pain, Moderate (4-6)


Amiodarone HCl (Amiodarone 200 Mg Tab)  200 mg PO BID JORDANA


Amlodipine Besylate (Amlodipine 10 Mg Tab)  10 mg PO QDAY JORDANA


Apixaban (Apixaban 5 Mg Tab)  5 mg PO Q48HR JORDANA


Duloxetine HCl (Duloxetine 30 Mg Cap)  30 mg PO DAILY JORDANA


Folic Acid (Folic Acid 1 Mg Tab)  1 mg PO QDAY JORDANA


Gabapentin (Gabapentin 300 Mg Cap)  300 mg PO DAILY JORDANA


Haloperidol Lactate (Haloperidol Lactate 5 Mg/1 Ml Inj)  5 mg IV Q1H PRN


   PRN Reason: Unrespon. to mult. doses BZD's


Sodium Chloride (Nacl 0.9% 1000 Ml)  1,000 mls @ 125 mls/hr IV AS DIRECT JORDANA


Magnesium Sulfate (Magnesium Sulfate 4gm/100ml)  4 gm in 100 mls @ 25 mls/hr IV 

ONCE ONE


   Stop: 08/09/21 11:59


Ibuprofen (Ibuprofen 600 Mg Tab)  600 mg PO Q6H PRN


   PRN Reason: Pain, Mild (1-3)


Levetiracetam (Levetiracetam 500 Mg/5 Ml Oral Liqd)  750 mg PO BID JORDANA


Lorazepam (Lorazepam 2 Mg/Ml Vial)  2 mg IV Q1H PRN


   PRN Reason: CIWA-Ar 8-15


Magnesium Hydroxide (Magnesium Hydroxide (Mom) Oral Liqd Udc)  30 ml PO Q4H PRN


   PRN Reason: Constipation


Magnesium Oxide (Magnesium Oxide 400 Mg Tab)  400 mg PO ONCE ONE


   Stop: 08/09/21 23:22


Magnesium Oxide (Magnesium Oxide 400 Mg Tab)  400 mg PO QDAY [facility]


Metoprolol Succinate (Metoprolol Succinate Xl 50 Mg Tab)  50 mg PO QDAY@0800 [facility]


Multivitamins (Multivitamins ,Therapeutic Tab)  1 each PO QDAY [facility]


Naloxone HCl (Naloxone 0.4 Mg/1 Ml Inj)  0.1 mg IV Q2MIN PRN


   PRN Reason: Res Rate </= 8 or 02 SAT < 92%


Ondansetron HCl (Ondansetron 4 Mg/2 Ml Inj)  4 mg IV Q8H PRN


   PRN Reason: Nausea And Vomiting


   Last Admin: 08/09/21 02:49 Dose:  4 mg


   Documented by: 


Pantoprazole Sodium (Pantoprazole 40 Mg Inj)  40 mg IV BID JORDANA


Sodium Chloride (Sodium Chloride 0.9% 10 Ml Flush Syringe)  10 ml IV BID JORDANA


Sodium Chloride (Sodium Chloride 0.9% 10 Ml Flush Syringe)  10 ml IV PRN PRN


   PRN Reason: LINE FLUSH


Spironolactone (Spironolactone 25 Mg Tab)  25 mg PO QDAY JORDANA


Thiamine HCl (Thiamine 100 Mg Tab)  100 mg PO QDAY JORDANA


Tramadol HCl (Tramadol 50 Mg Tab)  50 mg PO Q6H PRN


   PRN Reason: Pain, Moderate (4-6)











Review of Systems


All systems: negative (10 pt ROS performed and negative except for that listed 

in HPI)





Exam


                                   Vital Signs











Temp Pulse BP Pulse Ox


 


 98.6 F   109 H  104/79   96 


 


 08/08/21 19:44  08/08/21 19:44  08/08/21 19:44  08/08/21 19:44











Narrative exam: 





Gen; AAOx3. mild distress due to nausea


ENT: no scleral icterus 


CV: S1, S2+. tachy to 160s


Resp: even and unlabored


Abd: soft, ND, mild LUQ TTP. no r/r/g. Small scar in midepigastrum. Small 

reducible umbilical hernia without TTP or skin changes.


Ext: no c/c/e





Results





- Labs





                                 08/08/21 19:52





                                 08/09/21 05:30


                              Abnormal lab results











  08/08/21 08/08/21 08/08/21 Range/Units





  19:52 19:52 21:02 


 


Hgb  11.4 L    (11.8-15.2)  gm/dl


 


Hct  34.1 L    (35.5-45.6)  %


 


RDW  20.4 H    (13.2-15.2)  %


 


Lymph % (Auto)  8.8 L    (13.4-35.0)  %


 


Mono % (Auto)  11.7 H    (0.0-7.3)  %


 


Lymph # (Auto)  0.6 L    (1.2-5.4)  K/mm3


 


Seg Neutrophils %  79.2 H    (40.0-70.0)  %


 


Sodium   129 L   (137-145)  mmol/L


 


Potassium   2.4 L*   (3.6-5.0)  mmol/L


 


Chloride   78.0 L   ()  mmol/L


 


Carbon Dioxide     (22-30)  mmol/L


 


BUN   21 H   (9-20)  mg/dL


 


Creatinine   4.6 H   (0.8-1.3)  mg/dL


 


Glucose   155 H   ()  mg/dL


 


Magnesium    1.30 L  (1.7-2.3)  mg/dL


 


Total Bilirubin     (0.1-1.2)  mg/dL


 


Direct Bilirubin     (0-0.2)  mg/dL


 


AST   61 H   (5-40)  units/L


 


Plasma/Serum Alcohol     (0-0.07)  %














  08/08/21 08/09/21 Range/Units





  21:02 05:30 


 


Hgb    (11.8-15.2)  gm/dl


 


Hct    (35.5-45.6)  %


 


RDW    (13.2-15.2)  %


 


Lymph % (Auto)    (13.4-35.0)  %


 


Mono % (Auto)    (0.0-7.3)  %


 


Lymph # (Auto)    (1.2-5.4)  K/mm3


 


Seg Neutrophils %    (40.0-70.0)  %


 


Sodium   132 L  (137-145)  mmol/L


 


Potassium   2.6 L*  (3.6-5.0)  mmol/L


 


Chloride   81.1 L  ()  mmol/L


 


Carbon Dioxide   31 H  (22-30)  mmol/L


 


BUN   23 H  (9-20)  mg/dL


 


Creatinine   4.8 H  (0.8-1.3)  mg/dL


 


Glucose   125 H  ()  mg/dL


 


Magnesium   1.00 L  (1.7-2.3)  mg/dL


 


Total Bilirubin   1.50 H  (0.1-1.2)  mg/dL


 


Direct Bilirubin   0.5 H  (0-0.2)  mg/dL


 


AST   68 H  (5-40)  units/L


 


Plasma/Serum Alcohol  0.18 H   (0-0.07)  %








                                 Diabetes panel











  08/08/21 08/09/21 Range/Units





  19:52 05:30 


 


Sodium  129 L  132 L  (137-145)  mmol/L


 


Potassium  2.4 L*  2.6 L*  (3.6-5.0)  mmol/L


 


Chloride  78.0 L  81.1 L  ()  mmol/L


 


Carbon Dioxide  28  31 H  (22-30)  mmol/L


 


BUN  21 H  23 H  (9-20)  mg/dL


 


Creatinine  4.6 H  4.8 H  (0.8-1.3)  mg/dL


 


Glucose  155 H  125 H  ()  mg/dL


 


Calcium  8.8  8.6  (8.4-10.2)  mg/dL


 


AST  61 H  68 H  (5-40)  units/L


 


ALT  26  27  (7-56)  units/L


 


Alkaline Phosphatase  128  120  ()  units/L


 


Total Protein  7.6  6.8  (6.3-8.2)  g/dL


 


Albumin  4.1  3.9  (3.9-5)  g/dL








                                  Calcium panel











  08/08/21 08/08/21 08/09/21 Range/Units





  19:52 21:02 05:30 


 


Calcium  8.8   8.6  (8.4-10.2)  mg/dL


 


Phosphorus   2.50   (2.5-4.5)  mg/dL


 


Albumin  4.1   3.9  (3.9-5)  g/dL








                                 Pituitary panel











  08/08/21 08/09/21 Range/Units





  19:52 05:30 


 


Sodium  129 L  132 L  (137-145)  mmol/L


 


Potassium  2.4 L*  2.6 L*  (3.6-5.0)  mmol/L


 


Chloride  78.0 L  81.1 L  ()  mmol/L


 


Carbon Dioxide  28  31 H  (22-30)  mmol/L


 


BUN  21 H  23 H  (9-20)  mg/dL


 


Creatinine  4.6 H  4.8 H  (0.8-1.3)  mg/dL


 


Glucose  155 H  125 H  ()  mg/dL


 


Calcium  8.8  8.6  (8.4-10.2)  mg/dL








                                  Adrenal panel











  08/08/21 08/09/21 Range/Units





  19:52 05:30 


 


Sodium  129 L  132 L  (137-145)  mmol/L


 


Potassium  2.4 L*  2.6 L*  (3.6-5.0)  mmol/L


 


Chloride  78.0 L  81.1 L  ()  mmol/L


 


Carbon Dioxide  28  31 H  (22-30)  mmol/L


 


BUN  21 H  23 H  (9-20)  mg/dL


 


Creatinine  4.6 H  4.8 H  (0.8-1.3)  mg/dL


 


Glucose  155 H  125 H  ()  mg/dL


 


Calcium  8.8  8.6  (8.4-10.2)  mg/dL


 


Total Bilirubin  1.20  1.50 H  (0.1-1.2)  mg/dL


 


AST  61 H  68 H  (5-40)  units/L


 


ALT  26  27  (7-56)  units/L


 


Alkaline Phosphatase  128  120  ()  units/L


 


Total Protein  7.6  6.8  (6.3-8.2)  g/dL


 


Albumin  4.1  3.9  (3.9-5)  g/dL














- Imaging


CT scan - abdomen: report reviewed, image reviewed


CT scan - pelvis: report reviewed, image reviewed





Assessment and Plan





59 yo M with 





1. hiatal hernia - no evidence of obstruction.


2. ETOH intoxication


3. abd pain, r/o gastritis vs PUD








Plan:


1. may have ice chips. once nausea improves ->diet as skye


2. antiemetics - 


3. DT protocol per 1' team


4. IV fluids. Banana Bag ordered


5. PPI IV BID


6. GI consult


7. ETOH cessation


8. Recommend changing PO meds to IV - d/w Dr. Borges


9. No indication for urgent surgical intervention for hiatal hernia. Patient's 

current symptoms likely related to Gastritis vs PUD secondary to ETOH abuse. 

Recommend medical management.





Thank you, please call with questions.

## 2021-08-09 NOTE — ULTRASOUND REPORT
ULTRASOUND RENAL



INDICATION:

new onset renal failure.



COMPARISON:

CT 8/8/2021. 



FINDINGS:

RIGHT KIDNEY: Size: 10.6 cm. 

Echogenicity: Normal. Cortical thickness: Normal.

Stones: None. 

Hydronephrosis: None. 

Cyst or mass: None.  



LEFT KIDNEY: Size: 11.0 cm. 

Echogenicity: Normal. Cortical thickness: Normal. 

Stones: None. 

Hydronephrosis: None. 

Cyst or mass: None.  



Urinary Bladder: No significant abnormality.

Free Fluid: None.

Additional Findings: None.



IMPRESSION

1. No acute sonographic abnormality of the kidneys.



Signer Name: Juanito Hong MD 

Signed: 8/9/2021 10:48 AM

Workstation Name: [id number]

## 2021-08-09 NOTE — HISTORY AND PHYSICAL REPORT
History of Present Illness


Date of examination: 08/09/21


Date of admission: 


08/09/21


Chief complaint: 





Abdominal pain


Nausea and vomiting


History of present illness: 





This is a 58-year-old male who presented to ED with chief complaint of nausea 

vomiting and abdominal pain.  Patient has past medical history of hypertension, 

chronic alcohol abuse, seizure disorder on Keppra, A. fibon BB and Eliquis, 

congestive heart failure, cardiomegalypatient has history of ICD shock history 

of dual-chamber ICD.  CT of the abdomen is donepositive for hiatal hernia with 

inflammation likely secondary to peptic ulcer pain radiology report.  

Echocardiogram done 04/05/2021EF is 35 to 40%.  Blood work done in ED showed 

that patient is in acute renal failure.  Potassium 2.4 and magnesium 1.3.  Both 

of them replaced.  Nephrology was consulted and general surgeon consulted.  On 

assessment, patient reported abdominal pain of 8/10 with nausea and vomiting.  

Patient given Zofran.  Reviewed patient medication record, patient medical 

history, and vital signs.  Discussed alcohol use cessation with 

patientconsequence of alcohol use explained to patient.  Patient voiced 

understanding. 





Past History


Past Medical History: diabetes, hypertension


Past Surgical History: Other (Knee surgery)


Social history: lives with family, alcohol abuse


Family history: no significant family history





Medications and Allergies


                                    Allergies











Allergy/AdvReac Type Severity Reaction Status Date / Time


 


No Known Allergies Allergy   Verified 12/01/20 07:33











                                Home Medications











 Medication  Instructions  Recorded  Confirmed  Last Taken  Type


 


Pantoprazole [Protonix TAB] 40 mg PO BID #60 tablet 11/07/19 04/03/21 Unknown Rx


 


DULoxetine [Cymbalta] 30 mg PO DAILY 09/12/20 04/03/21 Unknown History


 


Apixaban [Eliquis] 5 mg PO Q48HR 12/01/20 04/03/21 Unknown History


 


Gabapentin 300 mg PO DAILY 12/01/20 04/03/21 Unknown History


 


Ondansetron [Zofran ODT TAB] 4 mg PO Q8HR 12/01/20 04/03/21 Unknown History


 


Spironolactone [Aldactone] 25 mg PO QDAY 12/01/20 04/03/21 Unknown History


 


Acetaminophen [Acetaminophen TAB] 650 mg PO Q4H PRN  tablet 12/03/20 04/03/21 

Unknown Rx


 


Magnesium Oxide [Mag-Ox] 400 mg PO QDAY 7 Days #7 tablet 12/03/20 04/03/21 

Unknown Rx


 


Metoprolol Xl [Metoprolol 50 mg PO QDAY #30 tablet 12/03/20 04/03/21 Unknown Rx





SUCCINATE ER TAB]     


 


amLODIPine 10 mg PO QDAY #30 tablet 12/03/20 04/03/21 Unknown Rx


 


levETIRAcetam [Keppra] 750 mg PO BID #60 oral.liqd 12/03/20 04/03/21 Unknown Rx


 


oxyCODONE /ACETAMINOPHEN [Percocet 1 tab PO Q6H PRN  tablet 12/03/20 04/03/21 

Unknown Rx





5/325 mg]     


 


traMADoL [Ultram 50 MG tab] 50 mg PO Q6H PRN #30 tablet 12/03/20 04/03/21 

Unknown Rx


 


Amiodarone [Cordarone 200 MG TAB] 200 mg PO BID #60 tablet 04/06/21  Unknown Rx


 


Folic Acid [Folvite] 1 mg PO QDAY #30 tablet 04/06/21  Unknown Rx


 


Thiamine [Vitamin B-1] 100 mg PO QDAY #30 tablet 04/06/21  Unknown Rx











Active Meds: 


Active Medications





Sodium Chloride (Nacl 0.9% 1000 Ml)  1,000 mls @ 125 mls/hr IV BOLUS ONE


   Stop: 08/09/21 07:29


   Last Admin: 08/08/21 23:40 Dose:  125 mls/hr


   Documented by: 


Magnesium Oxide (Magnesium Oxide 400 Mg Tab)  400 mg PO ONCE ONE


   Stop: 08/09/21 23:22











Review of Systems


Constitutional: fatigue, weakness, malaise


Ears, nose, mouth and throat: no epistaxis, no bleeding gums


Gastrointestinal: abdominal pain, nausea, vomiting


Rectal: no itching, no hemorrhoids


Integumentary: no rash, no pruritis


Neurological: no head injury


Hematologic/Lymphatic: no easy bruising, no easy bleeding


Allergic/Immunologic: no urticaria





Exam





- Constitutional


Vitals: 


                                        











Temp Pulse Resp BP Pulse Ox


 


 98.6 F   109 H     104/79   96 


 


 08/08/21 19:44  08/08/21 19:44     08/08/21 19:44  08/08/21 19:44











General appearance: Present: mild distress, other (alcohol use)





- EENT


Eyes: Present: PERRL


ENT: hearing intact, clear oral mucosa





- Neck


Neck: Present: supple, normal ROM





- Respiratory


Respiratory effort: normal


Respiratory: bilateral: CTA





- Cardiovascular


Heart Sounds: Present: S1 & S2.  Absent: rub, click





- Extremities


Extremities: pulses symmetrical, No edema


Peripheral Pulses: within normal limits





- Abdominal


General gastrointestinal: Present: soft, tender, non-distended, normal bowel 

sounds


Localized gastrointestinal: tender: RUQ, LUQ, RLQ, LLQ, epigastric periumbilical


Male genitourinary: Present: normal





- Integumentary


Integumentary: Present: clear, warm, dry





- Musculoskeletal


Musculoskeletal: gait normal, strength equal bilaterally





- Psychiatric


Psychiatric: appropriate mood/affect, intact judgment & insight





- Neurologic


Neurologic: CNII-XII intact, moves all extremities





- Allied Health


Allied health notes reviewed: nursing





Results





- Labs


CBC & Chem 7: 


                                 08/08/21 19:52





                                 08/09/21 05:30


Labs: 


                              Abnormal lab results











  08/08/21 08/08/21 08/08/21 Range/Units





  19:52 19:52 21:02 


 


Hgb  11.4 L    (11.8-15.2)  gm/dl


 


Hct  34.1 L    (35.5-45.6)  %


 


RDW  20.4 H    (13.2-15.2)  %


 


Lymph % (Auto)  8.8 L    (13.4-35.0)  %


 


Mono % (Auto)  11.7 H    (0.0-7.3)  %


 


Lymph # (Auto)  0.6 L    (1.2-5.4)  K/mm3


 


Seg Neutrophils %  79.2 H    (40.0-70.0)  %


 


Sodium   129 L   (137-145)  mmol/L


 


Potassium   2.4 L*   (3.6-5.0)  mmol/L


 


Chloride   78.0 L   ()  mmol/L


 


BUN   21 H   (9-20)  mg/dL


 


Creatinine   4.6 H   (0.8-1.3)  mg/dL


 


Glucose   155 H   ()  mg/dL


 


Magnesium    1.30 L  (1.7-2.3)  mg/dL


 


AST   61 H   (5-40)  units/L


 


Plasma/Serum Alcohol     (0-0.07)  %














  08/08/21 Range/Units





  21:02 


 


Hgb   (11.8-15.2)  gm/dl


 


Hct   (35.5-45.6)  %


 


RDW   (13.2-15.2)  %


 


Lymph % (Auto)   (13.4-35.0)  %


 


Mono % (Auto)   (0.0-7.3)  %


 


Lymph # (Auto)   (1.2-5.4)  K/mm3


 


Seg Neutrophils %   (40.0-70.0)  %


 


Sodium   (137-145)  mmol/L


 


Potassium   (3.6-5.0)  mmol/L


 


Chloride   ()  mmol/L


 


BUN   (9-20)  mg/dL


 


Creatinine   (0.8-1.3)  mg/dL


 


Glucose   ()  mg/dL


 


Magnesium   (1.7-2.3)  mg/dL


 


AST   (5-40)  units/L


 


Plasma/Serum Alcohol  0.18 H  (0-0.07)  %














Assessment and Plan





- Patient Problems


(1) HERMELINDA (acute kidney injury)


Current Visit: No   Status: Acute   


Plan to address problem: 


Likely 2/2 to dehydration/chronic alchol use/Chrononic kidney disease?


Renal US done with previous admission-04/05/21 with no acute finding.


Monitor kidney function


Continue IV hydration


Renal US-f/u with result








(2) Abdominal pain


Current Visit: No   Status: Acute   


Plan to address problem: 


pain managment


CT of the abdomen showed hiatal hernia with inflammation likely due to peptic 

ulcer


Empiric abx and PPI








(3) Alcohol abuse


Current Visit: Yes   Status: Acute   


Plan to address problem: 


Discussed alcohol use cessation


placed on CIWA protocol


Continue thiamin, folic acid and MVI








(4) Hypokalemia


Current Visit: Yes   Status: Acute   


Plan to address problem: 


replace potassium


monitor electrolytes








(5) Hypomagnesemia


Current Visit: Yes   Status: Acute   


Plan to address problem: 


likely 2/2 to malnutrion/alcohol use


Replace magnesium-am lab








(6) Hiatal hernia


Current Visit: Yes   Status: Acute   


Plan to address problem: 


Pain management and will consult general surge


CT of the abdomen positive for hernia


General surgery consulted








(7) Atrial fibrillation


Current Visit: No   Status: Acute   


Plan to address problem: 


Continue Home BB and eliquis








(8) CHF (congestive heart failure)


Current Visit: No   Status: Acute   


Qualifiers: 


 


Plan to address problem: 


Continue cardioprotective measure including BB


Patient has a HX of ICD shock, dual chamber ICD and cardiomagely


Continue home spirolactone


ECH-done 04/05/21 Showed EF 35 to 40%








(9) Hypertension


Current Visit: Yes   Status: Acute   


Plan to address problem: 


Monitor blood pressure


Resume home antihypertensive








(10) Hx of seizure disorder


Current Visit: Yes   Status: Acute   


Plan to address problem: 


Resume home Keppra


Seizure precaution








(11) DVT prophylaxis


Current Visit: Yes   Status: Acute   


Plan to address problem: 


Subcutaneous heparin

## 2021-08-10 LAB
ALBUMIN SERPL-MCNC: 3.4 G/DL (ref 3.9–5)
ALT SERPL-CCNC: 24 UNITS/L (ref 7–56)
BASOPHILS # (AUTO): 0 K/MM3 (ref 0–0.1)
BASOPHILS NFR BLD AUTO: 0.2 % (ref 0–1.8)
BILIRUB DIRECT SERPL-MCNC: 0.6 MG/DL (ref 0–0.2)
BUN SERPL-MCNC: 28 MG/DL (ref 9–20)
BUN/CREAT SERPL: 7 %
CALCIUM SERPL-MCNC: 8.7 MG/DL (ref 8.4–10.2)
EOSINOPHIL # BLD AUTO: 0 K/MM3 (ref 0–0.4)
EOSINOPHIL NFR BLD AUTO: 0.1 % (ref 0–4.3)
HCT VFR BLD CALC: 30.2 % (ref 35.5–45.6)
HEMOLYSIS INDEX: 7
HGB BLD-MCNC: 10.2 GM/DL (ref 11.8–15.2)
LYMPHOCYTES # BLD AUTO: 0.8 K/MM3 (ref 1.2–5.4)
LYMPHOCYTES NFR BLD AUTO: 16.1 % (ref 13.4–35)
MCHC RBC AUTO-ENTMCNC: 34 % (ref 32–34)
MCV RBC AUTO: 87 FL (ref 84–94)
MONOCYTES # (AUTO): 0.7 K/MM3 (ref 0–0.8)
MONOCYTES % (AUTO): 15.4 % (ref 0–7.3)
PLATELET # BLD: 111 K/MM3 (ref 140–440)
RBC # BLD AUTO: 3.49 M/MM3 (ref 3.65–5.03)

## 2021-08-10 RX ADMIN — METOCLOPRAMIDE SCH MG: 5 INJECTION, SOLUTION INTRAMUSCULAR; INTRAVENOUS at 13:27

## 2021-08-10 RX ADMIN — POTASSIUM CHLORIDE SCH MLS/HR: 10 INJECTION, SOLUTION INTRAVENOUS at 09:55

## 2021-08-10 RX ADMIN — PANTOPRAZOLE SODIUM SCH MG: 40 INJECTION, POWDER, FOR SOLUTION INTRAVENOUS at 10:49

## 2021-08-10 RX ADMIN — METOCLOPRAMIDE SCH MG: 5 INJECTION, SOLUTION INTRAMUSCULAR; INTRAVENOUS at 00:19

## 2021-08-10 RX ADMIN — SUCRALFATE ORAL SCH GM: 1 SUSPENSION ORAL at 00:20

## 2021-08-10 RX ADMIN — SUCRALFATE ORAL SCH GM: 1 SUSPENSION ORAL at 17:49

## 2021-08-10 RX ADMIN — Medication SCH ML: at 11:34

## 2021-08-10 RX ADMIN — SUCRALFATE ORAL SCH GM: 1 SUSPENSION ORAL at 06:25

## 2021-08-10 RX ADMIN — POTASSIUM CHLORIDE SCH MLS/HR: 10 INJECTION, SOLUTION INTRAVENOUS at 11:37

## 2021-08-10 RX ADMIN — METOCLOPRAMIDE SCH MG: 5 INJECTION, SOLUTION INTRAMUSCULAR; INTRAVENOUS at 17:49

## 2021-08-10 RX ADMIN — Medication SCH ML: at 22:12

## 2021-08-10 RX ADMIN — SUCRALFATE ORAL SCH GM: 1 SUSPENSION ORAL at 13:27

## 2021-08-10 RX ADMIN — METOPROLOL SUCCINATE SCH MG: 50 TABLET, EXTENDED RELEASE ORAL at 11:34

## 2021-08-10 RX ADMIN — POTASSIUM CHLORIDE SCH MLS/HR: 10 INJECTION, SOLUTION INTRAVENOUS at 15:07

## 2021-08-10 RX ADMIN — METOCLOPRAMIDE SCH MG: 5 INJECTION, SOLUTION INTRAMUSCULAR; INTRAVENOUS at 06:25

## 2021-08-10 RX ADMIN — PANTOPRAZOLE SODIUM SCH MG: 40 INJECTION, POWDER, FOR SOLUTION INTRAVENOUS at 22:11

## 2021-08-10 RX ADMIN — AMIODARONE HYDROCHLORIDE SCH MG: 200 TABLET ORAL at 11:35

## 2021-08-10 RX ADMIN — POTASSIUM CHLORIDE SCH MLS/HR: 10 INJECTION, SOLUTION INTRAVENOUS at 12:39

## 2021-08-10 RX ADMIN — METOPROLOL TARTRATE SCH MG: 50 TABLET, FILM COATED ORAL at 22:12

## 2021-08-10 RX ADMIN — POTASSIUM CHLORIDE SCH MLS/HR: 10 INJECTION, SOLUTION INTRAVENOUS at 13:46

## 2021-08-10 NOTE — PROGRESS NOTE
Assessment and Plan


Assessment and plan: 


This is 60-year-old male with HTN, DM, chronic EtOH abuse, p Olivarez's 

esophagitis, hiatal hernia, seizure disorder, A. fib, HFr EF, cardiomegaly s/p 

dual chamber AICD admitted with acute kidney injury, elevated LFT, hypokalemia, 

hypomagnesemia, A. fib with RVR





Neuro: h/o seizure disorder, h/o EtOH abuse


-[facility] protocol


-Reorientation as needed


-Aspiration/fall/seizure precautions


-Thiamine, folic acid


-As needed Haldol





Cardio: A. fib RVR, HTN, A. fib, nonischemic cardiomegaly s/p dual-chamber AICD,

HFrEF


-Cardiology consulted, appreciate recommendations


-3/2017 cardiac cath demonstrated normal coronary arteries with a ventricular 

ejection fraction of 20 to 25%


-5/2021 echocardiogram shows EF of 35 to 40%


-Blood pressure monitoring per protocol


-Amiodarone, amlodipine, metoprolol


-Continue home Eliquis for anticoagulation





Respiratory: NAD


-Supplemental oxygen as needed


-Pulmonary hygiene


-SPO2 monitoring





GI: Acute on chronic abdominal pain, elevated LFTs, h/o GERD, esophagitis, 

hiatal hernia, long segment Olivarez's esophagus


-GI consulted, appreciate recommendations


-5/2020 MRCP-see GI notes for details


-6/24/2021 EGD at OSH-see GI notes for details


-PPI


-Carafate 3 times daily


-As needed Reglan


-Consistent carbohydrate cardiac renal diet





: Acute kidney injury, hypokalemia, hypomagnesemia, metabolic alkalosis, 

hyponatremia


-Nephrology consulted, appreciate recommendations


-MIVF per nephrology


-Renal ultrasound within normal limits-see results


-Repeat electrolytes aggressively


-Avoid nephrotoxic medications


-Renally dose medications


-History continue nephro


-Daily weights





Endo: NAD


-Patient has a history of " diabetes mellitus" per charting


-Hemoglobin A1c 4.8


-Patient was on Accu-Cheks every 6 while n.p.o.


-Avoid hypoglycemia





ID: NAD


-Monitor for signs or symptoms of infection


-Patient has been afebrile





Heme: NAD


-Trend CBC


-Transfuse to hemoglobin less than 7


-Patient is on Eliquis for A. fib


-PPI


-SCDs to bilateral lower extremities while in bed








The high probability of a clinically significant, sudden or life threatening 

deterioration of the [renal/cardio] system(s) required my full and direct 

attention, intervention and personal management. The aggregate critical care 

time was [60] minutes. This time is in addition to time spent performing 

reported procedures but includes the following: 





[x] Data Review and interpretation 





[x] Patient assessment and monitoring of vital signs 





[x] Documentation 





[x] Medication orders and management


Disposition Plan: Tele


Total Time Spent with Patient (Minutes): 60





History


Interval history: 





This is 60-year-old male with HTN, DM, chronic EtOH abuse, seizure disorder on 

Keppra, A. fib on beta-blocker and Eliquis, HFrEF(EF 35 to 40%), cardiomegaly 

s/p dual-chamber AICD who presented to the emergency department with abdominal 

pain and nausea and vomiting on 8/9.  Work-up in the emergency department 

included a CT abdomen which showed hiatal hernia with inflammation likely 

secondary to peptic ulcer, lab work showed acute renal failure, hypokalemia, 

hypomagnesemia.  Patient is admitted to the hospital service with consults to 

nephrology, GI, cardiology, nephrology.





8/9: Patient was placed on intravenous amiodarone


8/10: Patient has been advanced to consider carbohydrate cardiac and renal diet.

  Patient has been downgraded to telemetry floor.  GI has signed off.  And 

nephrology like to continue IV fluid and encourage p.o. intake.








Hospitalist Physical





- Constitutional


Vitals: 


                                        











Temp Pulse Resp BP Pulse Ox


 


 98.7 F   84   13   123/89   98 


 


 08/10/21 13:09  08/10/21 17:01  08/10/21 17:01  08/10/21 17:01  08/10/21 17:01











General appearance: Present: no acute distress





- EENT


Eyes: Present: PERRL, EOM intact


ENT: hearing intact, clear oral mucosa, dentition normal





- Neck


Neck: Present: supple, normal ROM





- Respiratory


Respiratory effort: normal


Respiratory: bilateral: CTA





- Cardiovascular


Rhythm: regular


Heart Sounds: Present: S1 & S2.  Absent: systolic murmur, diastolic murmur





- Extremities


Extremities: no ischemia, pulses intact, pulses symmetrical, No edema, normal 

temperature, normal color


Peripheral Pulses: within normal limits





- Abdominal


General gastrointestinal: soft, non-tender, non-distended, normal bowel sounds





- Integumentary


Integumentary: Present: warm, dry





- Psychiatric


Psychiatric: cooperative





- Neurologic


Neurologic: CNII-XII intact, no focal deficits, moves all extremities





- Allied Health


Allied health notes reviewed: nursing, social work





Results





- Labs


CBC & Chem 7: 


                                 08/10/21 04:03





                                 08/10/21 04:03


Labs: 


                             Laboratory Last Values











WBC  4.8 K/mm3 (4.5-11.0)   08/10/21  04:03    


 


RBC  3.49 M/mm3 (3.65-5.03)  L  08/10/21  04:03    


 


Hgb  10.2 gm/dl (11.8-15.2)  L  08/10/21  04:03    


 


Hct  30.2 % (35.5-45.6)  L  08/10/21  04:03    


 


MCV  87 fl (84-94)   08/10/21  04:03    


 


MCH  29 pg (28-32)   08/10/21  04:03    


 


MCHC  34 % (32-34)   08/10/21  04:03    


 


RDW  20.3 % (13.2-15.2)  H  08/10/21  04:03    


 


Plt Count  111 K/mm3 (140-440)  L  08/10/21  04:03    


 


Lymph % (Auto)  16.1 % (13.4-35.0)   08/10/21  04:03    


 


Mono % (Auto)  15.4 % (0.0-7.3)  H  08/10/21  04:03    


 


Eos % (Auto)  0.1 % (0.0-4.3)   08/10/21  04:03    


 


Baso % (Auto)  0.2 % (0.0-1.8)   08/10/21  04:03    


 


Lymph # (Auto)  0.8 K/mm3 (1.2-5.4)  L  08/10/21  04:03    


 


Mono # (Auto)  0.7 K/mm3 (0.0-0.8)   08/10/21  04:03    


 


Eos # (Auto)  0.0 K/mm3 (0.0-0.4)   08/10/21  04:03    


 


Baso # (Auto)  0.0 K/mm3 (0.0-0.1)   08/10/21  04:03    


 


Seg Neutrophils %  68.2 % (40.0-70.0)   08/10/21  04:03    


 


Seg Neutrophils #  3.3 K/mm3 (1.8-7.7)   08/10/21  04:03    


 


PT  13.9 Sec. (12.2-14.9)   08/08/21  21:02    


 


INR  1.02  (0.87-1.13)   08/08/21  21:02    


 


APTT  28.6 Sec. (24.2-36.6)   08/08/21  21:02    


 


Sodium  132 mmol/L (137-145)  L  08/10/21  04:03    


 


Potassium  3.3 mmol/L (3.6-5.0)  L D 08/10/21  04:03    


 


Chloride  86.1 mmol/L ()  L  08/10/21  04:03    


 


Carbon Dioxide  30 mmol/L (22-30)   08/10/21  04:03    


 


Anion Gap  19 mmol/L  08/10/21  04:03    


 


BUN  28 mg/dL (9-20)  H  08/10/21  04:03    


 


Creatinine  4.2 mg/dL (0.8-1.3)  H  08/10/21  04:03    


 


Estimated GFR  17 ml/min  08/10/21  04:03    


 


BUN/Creatinine Ratio  7 %  08/10/21  04:03    


 


Glucose  115 mg/dL ()  H  08/10/21  04:03    


 


Hemoglobin A1c  4.8 % (4-6)   08/10/21  04:03    


 


Calcium  8.7 mg/dL (8.4-10.2)   08/10/21  04:03    


 


Phosphorus  2.50 mg/dL (2.5-4.5)   08/08/21  21:02    


 


Magnesium  1.00 mg/dL (1.7-2.3)  L  08/09/21  05:30    


 


Total Bilirubin  1.40 mg/dL (0.1-1.2)  H  08/10/21  04:03    


 


Direct Bilirubin  0.6 mg/dL (0-0.2)  H  08/10/21  04:03    


 


Indirect Bilirubin  0.8 mg/dL  08/10/21  04:03    


 


AST  56 units/L (5-40)  H  08/10/21  04:03    


 


ALT  24 units/L (7-56)   08/10/21  04:03    


 


Alkaline Phosphatase  110 units/L ()   08/10/21  04:03    


 


Total Protein  6.5 g/dL (6.3-8.2)   08/10/21  04:03    


 


Albumin  3.4 g/dL (3.9-5)  L  08/10/21  04:03    


 


Albumin/Globulin Ratio  1.1 %  08/10/21  04:03    


 


Lipase  20 units/L (13-60)   08/08/21  19:52    


 


Plasma/Serum Alcohol  0.18 % (0-0.07)  H  08/08/21  21:02    














Active Medications





- Current Medications


Current Medications: 














Generic Name Dose Route Start Last Admin





  Trade Name Freq  PRN Reason Stop Dose Admin


 


Acetaminophen  650 mg  08/09/21 00:17 





  Acetaminophen 325 Mg Tab  PO  





  Q4H PRN  





  Pain MILD(1-3)/Fever >100.5/HA  


 


Amiodarone HCl  200 mg  08/11/21 10:00 





  Amiodarone 200 Mg Tab  PO  





  QDAY JORDANA  


 


Amlodipine Besylate  10 mg  08/09/21 10:00  08/10/21 10:49





  Amlodipine 10 Mg Tab  PO   10 mg





  QDAY JORDANA   Administration


 


Apixaban  5 mg  08/09/21 10:00  08/09/21 12:47





  Apixaban 5 Mg Tab  PO   5 mg





  Q48HR JORDANA   Administration


 


Dextrose  50 ml  08/10/21 12:24 





  Dextrose 50% In Water (25gm) 50 Ml Syringe  IV  





  Q30MIN PRN  





  Hypoglycemia  





  Protocol  


 


Folic Acid  1 mg  08/11/21 10:00 





  Folic Acid 1 Mg Tab  PO  





  QDAY JORDANA  


 


Haloperidol Lactate  5 mg  08/09/21 00:17 





  Haloperidol Lactate 5 Mg/1 Ml Inj  IV  





  Q1H PRN  





  Unrespon. to mult. doses BZD's  


 


Hydralazine HCl  10 mg  08/10/21 10:05 





  Hydralazine 20 Mg/1 Ml Inj  IV  





  Q4HR PRN  





  Hypertension  


 


Sodium Chloride  1,000 mls @ 125 mls/hr  08/09/21 00:30 





  Nacl 0.9% 1000 Ml  IV  





  AS DIRECT JORDANA  


 


Levetiracetam  750 mg  08/10/21 22:00 





  Levetiracetam 500 Mg Tab  PO  





  BID JORDANA  


 


Lorazepam  2 mg  08/09/21 00:17  08/09/21 16:06





  Lorazepam 2 Mg/Ml Vial  IV   2 mg





  Q1H PRN   Administration





  NOA-Ar 8-15  


 


Metoclopramide HCl  5 mg  08/09/21 12:00  08/10/21 13:27





  Metoclopramide 10 Mg/2 Ml Inj  IV   5 mg





  Q6H JORDANA   Administration


 


Metoprolol Tartrate  50 mg  08/10/21 22:00 





  Metoprolol Tartrate 50 Mg Tab  PO  





  BID JORDANA  


 


Morphine Sulfate  2 mg  08/09/21 12:00 





  Morphine 2 Mg/1 Ml Inj  IV  





  Q4H PRN  





  Pain , Severe (7-10)  


 


Naloxone HCl  0.1 mg  08/09/21 00:17 





  Naloxone 0.4 Mg/1 Ml Inj  IV  





  Q2MIN PRN  





  Res Rate </= 8 or 02 SAT < 92%  


 


Ondansetron HCl  4 mg  08/09/21 00:17  08/09/21 02:49





  Ondansetron 4 Mg/2 Ml Inj  IV   4 mg





  Q8H PRN   Administration





  Nausea And Vomiting  


 


Pantoprazole Sodium  40 mg  08/09/21 10:00  08/10/21 10:49





  Pantoprazole 40 Mg Inj  IV   40 mg





  BID JORDANA   Administration


 


Scopolamine  1 each  08/09/21 13:00  08/09/21 14:54





  Scopolamine Transdermal Patch 72 Hr  TD   1 each





  Q3D JORDANA   Administration


 


Sodium Chloride  10 ml  08/09/21 10:00  08/10/21 11:34





  Sodium Chloride 0.9% 10 Ml Flush Syringe  IV   10 ml





  BID JORDANA   Administration


 


Sodium Chloride  10 ml  08/09/21 00:17 





  Sodium Chloride 0.9% 10 Ml Flush Syringe  IV  





  PRN PRN  





  LINE FLUSH  


 


Sucralfate  1 gm  08/09/21 18:00  08/10/21 13:27





  Sucralfate 1 Gm/10 Ml Oral Liqd  PO   1 gm





  Q6HR JORDANA   Administration

## 2021-08-10 NOTE — GASTROENTEROLOGY PROGRESS NOTE
Assessment and Plan











This is a 59 yo male with pmh of chronic alcohol abuse, seizure disorder, afib 

on eliquis, CHF, EF 35%, dual chamber ICD, and chronic abdominal pain with 

nausea/vomiting admitted for renal failure. GI consulted for abdominal pain. 





# acute on chronic abdominal pain. 





- most recent EGD at [facility] in 06/2021 showing esophagitis, hiatal hernia, and long

segment Olivarez's esophagus. Multiple prior EGDs showing esophagitis, hiatal 

hernia, and BE.  


- worsening symptoms over past few days. 


- persistent alcohol use. 


- unclear if he has been compliant with outpatient medications. 


- suspect due to esophagitis, gastritis in the setting of hiatal hernia which 

may be contributing to his chronic nausea/vomiting. 


- prior biliary work up with HIDA scan was normal. 


- clinically improving and tolerating diet without nausea/vomiting. 





Rec


- cont with PPI bid, sucralfate TID


- reglan prn for nausea. 


- conservative management


- supportive care


- will sign off. please call with any questions














Subjective


Date of service: 08/10/21


Interval history: 





Patient placed on amiodarone for tachycardia. Feels better with abdominal pain 

improving. Tolerating diet without vomiting this morning. 





Objective





- Constitutional


Vitals: 


                                        











Temp Pulse Resp BP Pulse Ox


 


 98.7 F   88   16   107/74   99 


 


 08/10/21 13:09  08/10/21 13:09  08/10/21 13:09  08/10/21 13:09  08/10/21 13:09











General appearance: no acute distress





- EENT


ENT: hearing intact





- Respiratory


Respiratory effort: normal





- Cardiovascular


Rhythm: irregularly irregular


Heart Sounds: Present: S1 & S2





- Gastrointestinal


General gastrointestinal: Present: soft, non-tender, non-distended, normal bowel

sounds





- Integumentary


Integumentary: Present: clear, warm





- Neurologic


Neurological: alert and oriented x3





- Labs


CBC & Chem 7: 


                                 08/10/21 04:03





                                 08/10/21 04:03


Labs: 


                         Laboratory Results - last 24 hr











  08/10/21 08/10/21 08/10/21





  04:03 04:03 04:03


 


WBC  4.8  


 


RBC  3.49 L  


 


Hgb  10.2 L  


 


Hct  30.2 L  


 


MCV  87  


 


MCH  29  


 


MCHC  34  


 


RDW  20.3 H  


 


Plt Count  111 L  


 


Lymph % (Auto)  16.1  


 


Mono % (Auto)  15.4 H  


 


Eos % (Auto)  0.1  


 


Baso % (Auto)  0.2  


 


Lymph # (Auto)  0.8 L  


 


Mono # (Auto)  0.7  


 


Eos # (Auto)  0.0  


 


Baso # (Auto)  0.0  


 


Seg Neutrophils %  68.2  


 


Seg Neutrophils #  3.3  


 


Sodium   132 L 


 


Potassium   3.3 L D 


 


Chloride   86.1 L 


 


Carbon Dioxide   30 


 


Anion Gap   19 


 


BUN   28 H 


 


Creatinine   4.2 H 


 


Estimated GFR   17 


 


BUN/Creatinine Ratio   7 


 


Glucose   115 H 


 


Calcium   8.7 


 


Total Bilirubin    1.40 H


 


Direct Bilirubin    0.6 H


 


Indirect Bilirubin    0.8


 


AST    56 H


 


ALT    24


 


Alkaline Phosphatase    110


 


Total Protein    6.5


 


Albumin    3.4 L


 


Albumin/Globulin Ratio    1.1

## 2021-08-10 NOTE — CONSULTATION
History of Present Illness


Consult date: 08/10/21


Consult reason: atrial fibrillation


History of present illness: 





The patient is a 60-year-old man with chronic alcohol abuse, and a chronic 

dilated nonischemic cardiomyopathy.  In March 2017, cardiac catheterization 

demonstrated normal coronary arteries with left ventricular ejection fraction 20

to 25%.  He subsequently underwent placement of a [employer] ICD.  Over the 

years, his cardiomyopathy has improved with medical therapy despite his 

continued chronic alcohol use.  Most recent echocardiogram 3 months ago was 

ejection fraction 35 to 40%.  His cardiac history is also notable for paroxysmal

atrial fibrillation, on his previous admission 3 months ago there was a brief.  

Of rapid atrial fibrillation while on the telemetry monitor, which resolved 

spontaneously, following which was placed on Eliquis therapy.





He presents to the hospital at this time with primary complaints of several days

of nausea and vomiting, and on presentation his alcohol level was markedly 

elevated at 0.18.  There was multiple electrolyte abnormalities including acute 

renal failure with a creatinine of 4.8, and marked hypokalemia potassium 2.4.  

Associated with all this was the finding of a recurrence of his rapid atrial 

fibrillation.





He was placed on intravenous amiodarone for the atrial fibrillation, and has 

currently returned to a sinus rhythm with frequent PACs.  The EKG did not show 

any ischemic ST changes, and his cardiac troponin levels were apparently not 

measured.





Past History


Past Medical History: atrial fib, diabetes, heart failure, hypertension, other 

(ICD implant)


Past Surgical History: Other (Knee surgery, abdominal surgery )


Social history: lives with family, alcohol abuse


Family history: no significant family history





Medications and Allergies


                                    Allergies











Allergy/AdvReac Type Severity Reaction Status Date / Time


 


No Known Allergies Allergy   Verified 12/01/20 07:33











                                Home Medications











 Medication  Instructions  Recorded  Confirmed  Last Taken  Type


 


Pantoprazole [Protonix TAB] 40 mg PO BID #60 tablet 11/07/19 04/03/21 Unknown Rx


 


DULoxetine [Cymbalta] 30 mg PO DAILY 09/12/20 04/03/21 Unknown History


 


Apixaban [Eliquis] 5 mg PO Q48HR 12/01/20 04/03/21 Unknown History


 


Gabapentin 300 mg PO DAILY 12/01/20 04/03/21 Unknown History


 


Ondansetron [Zofran ODT TAB] 4 mg PO Q8HR 12/01/20 04/03/21 Unknown History


 


Spironolactone [Aldactone] 25 mg PO QDAY 12/01/20 04/03/21 Unknown History


 


Acetaminophen [Acetaminophen TAB] 650 mg PO Q4H PRN  tablet 12/03/20 04/03/21 

Unknown Rx


 


Magnesium Oxide [Mag-Ox] 400 mg PO QDAY 7 Days #7 tablet 12/03/20 04/03/21 

Unknown Rx


 


Metoprolol Xl [Metoprolol 50 mg PO QDAY #30 tablet 12/03/20 04/03/21 Unknown Rx





SUCCINATE ER TAB]     


 


amLODIPine 10 mg PO QDAY #30 tablet 12/03/20 04/03/21 Unknown Rx


 


levETIRAcetam [Keppra] 750 mg PO BID #60 oral.liqd 12/03/20 04/03/21 Unknown Rx


 


oxyCODONE /ACETAMINOPHEN [Percocet 1 tab PO Q6H PRN  tablet 12/03/20 04/03/21 

Unknown Rx





5/325 mg]     


 


traMADoL [Ultram 50 MG tab] 50 mg PO Q6H PRN #30 tablet 12/03/20 04/03/21 Unkn

own Rx


 


Amiodarone [Cordarone 200 MG TAB] 200 mg PO BID #60 tablet 04/06/21  Unknown Rx


 


Folic Acid [Folvite] 1 mg PO QDAY #30 tablet 04/06/21  Unknown Rx


 


Thiamine [Vitamin B-1] 100 mg PO QDAY #30 tablet 04/06/21  Unknown Rx











Active Meds: 


Active Medications





Acetaminophen (Acetaminophen 325 Mg Tab)  650 mg PO Q4H PRN


   PRN Reason: Pain MILD(1-3)/Fever >100.5/HA


Amiodarone HCl (Amiodarone 200 Mg Tab)  200 mg PO BID [facility]


   Last Admin: 08/10/21 11:35 Dose:  200 mg


   Documented by: 


Amlodipine Besylate (Amlodipine 10 Mg Tab)  10 mg PO QDAY [facility]


   Last Admin: 08/10/21 10:49 Dose:  10 mg


   Documented by: 


Apixaban (Apixaban 5 Mg Tab)  5 mg PO Q48HR [facility]


   Last Admin: 08/09/21 12:47 Dose:  5 mg


   Documented by: 


Haloperidol Lactate (Haloperidol Lactate 5 Mg/1 Ml Inj)  5 mg IV Q1H PRN


   PRN Reason: Unrespon. to mult. doses BZD's


Hydralazine HCl (Hydralazine 20 Mg/1 Ml Inj)  10 mg IV Q4HR PRN


   PRN Reason: Hypertension


Sodium Chloride (Nacl 0.9% 1000 Ml)  1,000 mls @ 125 mls/hr IV AS DIRECT [facility]


Levetiracetam 750 mg/ Dextrose  107.5 mls @ 400 mls/hr IV Q12HR [facility]


Folic Acid 1 mg/ Sodium (Chloride)  50.2 mls @ 200.8 mls/hr IV QDAY [facility]


   Last Admin: 08/10/21 12:18 Dose:  200.8 mls/hr


   Documented by: 


Potassium Chloride (Kcl 10meq/100ml)  10 meq in 100 mls @ 100 mls/hr IV Q1H [facility]


   Stop: 08/10/21 13:59


Lorazepam (Lorazepam 2 Mg/Ml Vial)  2 mg IV Q1H PRN


   PRN Reason: CIWA-Ar 8-15


   Last Admin: 08/09/21 16:06 Dose:  2 mg


   Documented by: 


Metoclopramide HCl (Metoclopramide 10 Mg/2 Ml Inj)  5 mg IV Q6H [facility]


   Last Admin: 08/10/21 06:25 Dose:  5 mg


   Documented by: 


Metoprolol Tartrate (Metoprolol Tartrate 50 Mg Tab)  50 mg PO BID [facility]


Morphine Sulfate (Morphine 2 Mg/1 Ml Inj)  2 mg IV Q4H PRN


   PRN Reason: Pain , Severe (7-10)


Naloxone HCl (Naloxone 0.4 Mg/1 Ml Inj)  0.1 mg IV Q2MIN PRN


   PRN Reason: Res Rate </= 8 or 02 SAT < 92%


Ondansetron HCl (Ondansetron 4 Mg/2 Ml Inj)  4 mg IV Q8H PRN


   PRN Reason: Nausea And Vomiting


   Last Admin: 08/09/21 02:49 Dose:  4 mg


   Documented by: 


Pantoprazole Sodium (Pantoprazole 40 Mg Inj)  40 mg IV BID [facility]


   Last Admin: 08/10/21 10:49 Dose:  40 mg


   Documented by: 


Scopolamine (Scopolamine Transdermal Patch 72 Hr)  1 each TD Q3D [facility]


   Last Admin: 08/09/21 14:54 Dose:  1 each


   Documented by: 


Sodium Chloride (Sodium Chloride 0.9% 10 Ml Flush Syringe)  10 ml IV BID [facility]


   Last Admin: 08/10/21 11:34 Dose:  10 ml


   Documented by: 


Sodium Chloride (Sodium Chloride 0.9% 10 Ml Flush Syringe)  10 ml IV PRN PRN


   PRN Reason: LINE FLUSH


Sucralfate (Sucralfate 1 Gm/10 Ml Oral Liqd)  1 gm PO Q6HR JORDANA


   Last Admin: 08/10/21 06:25 Dose:  1 gm


   Documented by: 











Review of Systems


Cardiovascular: palpitations, rapid/irregular heart beat, shortness of breath, 

no chest pain, no orthopnea, no edema, no syncope, no lightheadedness


Gastrointestinal: nausea, vomiting





Physical Examination


                                   Vital Signs











Temp Pulse BP Pulse Ox


 


 98.6 F   109 H  104/79   96 


 


 08/08/21 19:44  08/08/21 19:44  08/08/21 19:44  08/08/21 19:44











General appearance: no acute distress


HEENT: Positive: PERRL


Neck: Positive: neck supple


Cardiac: Positive: Regular Rhythm


Lungs: Positive: Decreased Breath Sounds


Neuro: Positive: Grossly Intact


Abdomen: Positive: Soft


Male genitourinary: Positive: deferred


Skin: Positive: Clear


Extremities: Absent: edema





Results





                                 08/10/21 04:03





                                 08/10/21 04:03


                                 Cardiac Enzymes











  08/10/21 Range/Units





  04:03 


 


AST  56 H  (5-40)  units/L








                                       CBC











  08/10/21 Range/Units





  04:03 


 


WBC  4.8  (4.5-11.0)  K/mm3


 


RBC  3.49 L  (3.65-5.03)  M/mm3


 


Hgb  10.2 L  (11.8-15.2)  gm/dl


 


Hct  30.2 L  (35.5-45.6)  %


 


Plt Count  111 L  (140-440)  K/mm3


 


Lymph # (Auto)  0.8 L  (1.2-5.4)  K/mm3


 


Mono # (Auto)  0.7  (0.0-0.8)  K/mm3


 


Eos # (Auto)  0.0  (0.0-0.4)  K/mm3


 


Baso # (Auto)  0.0  (0.0-0.1)  K/mm3








                          Comprehensive Metabolic Panel











  08/10/21 08/10/21 Range/Units





  04:03 04:03 


 


Sodium  132 L   (137-145)  mmol/L


 


Potassium  3.3 L D   (3.6-5.0)  mmol/L


 


Chloride  86.1 L   ()  mmol/L


 


Carbon Dioxide  30   (22-30)  mmol/L


 


BUN  28 H   (9-20)  mg/dL


 


Creatinine  4.2 H   (0.8-1.3)  mg/dL


 


Glucose  115 H   ()  mg/dL


 


Calcium  8.7   (8.4-10.2)  mg/dL


 


Direct Bilirubin   0.6 H  (0-0.2)  mg/dL


 


Indirect Bilirubin   0.8  mg/dL


 


AST   56 H  (5-40)  units/L


 


ALT   24  (7-56)  units/L


 


Alkaline Phosphatase   110  ()  units/L


 


Total Protein   6.5  (6.3-8.2)  g/dL


 


Albumin   3.4 L  (3.9-5)  g/dL














EKG interpretations





- Telemetry


EKG Rhythm: Atrial Fibrillation





Assessment and Plan





- Patient Problems


(1) Rapid atrial fibrillation


Current Visit: Yes   Status: Acute   


Plan to address problem: 


Patient has a history of paroxysmal atrial fibrillation, currently rapid atrial 

fibrillation likely exacerbated by his GI symptoms and electrolyte abnormal

ities.  We will switch amiodarone to oral, and follow the treatment with this 

closely while monitoring his liver function tests.  Continue oral 

anticoagulation with Eliquis.  Optimize beta-blocker therapy.








(2) Nonischemic cardiomyopathy


Current Visit: Yes   Status: Acute   


Plan to address problem: 


History of longstanding, but resolving nonischemic cardiomyopathy, most recent 

echocardiogram reported left ventricular ejection fraction 35 to 40%.  Continue 

guideline directed medical therapy as tolerated.








(3) Alcohol abuse


Current Visit: Yes   Status: Acute   


Plan to address problem: 


Patient has a long history of alcohol abuse, alcohol level was 0.18 on 

presentation.  Will defer to internal medicine service for close monitoring of 

high risk of alcohol withdrawal.

## 2021-08-10 NOTE — PROGRESS NOTE
Assessment and Plan





# Acute Kidney Injury: suspect pre-renal injury with dehydration, alcohol use.  

Similar presentation in past, with creatinine peaking to 4.9 before improving to

1.1 with IVF.  Creatinine 4.6->4.8->4.2 with supportive measures


- continue IVF as tolerated, encourage PO hydration as tolerated and allowed


- reviewed prior workup for imaging, serologies; ultrasound WNL


- strict Is/Os 


- replete lytes aggressively


- renally dose meds


- avoid nephrotoxins- consider alternative to PPI if able given AIN risk


- no immediate indication for renal replacement therapy or biopsy


- holding MRA for now given HERMELINDA, hypotension





# Hypokalemia: due to alcohol use, replete; improving to 3.3 





# Hypomagnesemia: due to above, replete prn





# Metabolic Alkalosis: due to vomiting, contraction





# Hyponatremia: due to alcohol use, dehydration, improving with IVF





# Abdominal pain: reviewed CT which shows hiatal hernia, appreciate surgery 

input





# Alcohol abuse





# A-Fib/Tachycardia: on BB





# CHF: note prior echo, hold MRA for now, monitor volume status with IVF 





# HTN: BP now at goal after running low initially, monitor closely given HERMELINDA; on

amlodipine, BB








Subjective


Date of service: 08/10/21


Interval history: 





Resting this AM, remains in ED, frustrated





Objective





- Exam


Narrative Exam: 





General appearance: well-developed, well-nourished, no distress


EENT: ATNC


Neck: Present: neck supple


Respiratory: Clear to Ascultation


Heart: regular, S1S2


Gastrointestinal: Present: normoactive bowel sounds, tenderness


Integumentary: no rash, warm and dry


Neurologic: no focal deficit, alert and oriented x3


Psychiatric: mood/affect appropriate





- Vital Signs


Vital signs: 


                               Vital Signs - 12hr











  08/09/21 08/09/21 08/09/21





  21:31 21:35 21:41


 


Pulse Rate 135 H 121 H 107 H


 


Respiratory 21 21 23





Rate   


 


Blood Pressure 135/108 135/108 135/108


 


O2 Sat by Pulse   





Oximetry   














  08/09/21 08/09/21 08/09/21





  21:45 21:51 21:55


 


Pulse Rate 107 H 103 H 102 H


 


Respiratory 21 20 16





Rate   


 


Blood Pressure 135/108 135/108 136/98


 


O2 Sat by Pulse   





Oximetry   














  08/09/21 08/09/21 08/09/21





  22:01 22:05 22:11


 


Pulse Rate 107 H 99 H 120 H


 


Respiratory 17 17 18





Rate   


 


Blood Pressure 136/98 136/98 136/98


 


O2 Sat by Pulse   





Oximetry   














  08/09/21 08/09/21 08/09/21





  22:15 22:21 22:25


 


Pulse Rate 110 H 101 H 107 H


 


Respiratory 21 17 18





Rate   


 


Blood Pressure 136/98 136/98 127/96


 


O2 Sat by Pulse 96 97 97





Oximetry   














  08/09/21 08/09/21 08/09/21





  22:31 22:35 22:41


 


Pulse Rate 103 H 100 H 103 H


 


Respiratory 17 19 18





Rate   


 


Blood Pressure 127/96 127/96 127/96


 


O2 Sat by Pulse 96 97 95





Oximetry   














  08/09/21 08/09/21 08/09/21





  22:45 22:51 22:55


 


Pulse Rate 97 H 100 H 97 H


 


Respiratory 17 18 17





Rate   


 


Blood Pressure 127/96 127/96 121/74


 


O2 Sat by Pulse 94 97 96





Oximetry   














  08/09/21 08/09/21 08/09/21





  23:01 23:05 23:11


 


Pulse Rate 103 H 104 H 119 H


 


Respiratory 15 18 13





Rate   


 


Blood Pressure 121/74 121/74 121/74


 


O2 Sat by Pulse 97 96 96





Oximetry   














  08/09/21 08/09/21 08/09/21





  23:15 23:21 23:25


 


Pulse Rate 103 H 103 H 100 H


 


Respiratory 17 18 22





Rate   


 


Blood Pressure 121/74 121/74 148/85


 


O2 Sat by Pulse 97 95 94





Oximetry   














  08/09/21 08/09/21 08/09/21





  23:31 23:35 23:41


 


Pulse Rate 102 H 103 H 103 H


 


Respiratory 20 20 18





Rate   


 


Blood Pressure 148/85 148/85 148/85


 


O2 Sat by Pulse 98 97 97





Oximetry   














  08/09/21 08/09/21 08/09/21





  23:45 23:51 23:55


 


Pulse Rate 102 H 100 H 101 H


 


Respiratory 20 19 19





Rate   


 


Blood Pressure 148/85 148/85 149/100


 


O2 Sat by Pulse 97 99 98





Oximetry   














  08/10/21 08/10/21 08/10/21





  00:01 00:03 00:05


 


Pulse Rate 101 H 101 H 98 H


 


Respiratory 20 20 19





Rate   


 


Blood Pressure 149/100 149/100 149/100


 


O2 Sat by Pulse 96 96 99





Oximetry   














  08/10/21 08/10/21 08/10/21





  00:11 00:15 00:21


 


Pulse Rate 105 H 105 H 103 H


 


Respiratory 18 21 18





Rate   


 


Blood Pressure 149/100 149/100 149/100


 


O2 Sat by Pulse 98 96 97





Oximetry   














  08/10/21 08/10/21 08/10/21





  00:25 00:31 00:35


 


Pulse Rate 107 H 97 H 98 H


 


Respiratory 17 17 16





Rate   


 


Blood Pressure 120/105 120/105 149/100


 


O2 Sat by Pulse 97 96 96





Oximetry   














  08/10/21 08/10/21 08/10/21





  00:41 00:45 00:51


 


Pulse Rate 106 H 100 H 102 H


 


Respiratory 18 19 18





Rate   


 


Blood Pressure 149/100 149/100 149/100


 


O2 Sat by Pulse 97 97 95





Oximetry   














  08/10/21 08/10/21 08/10/21





  00:55 01:01 01:05


 


Pulse Rate 100 H 98 H 101 H


 


Respiratory 19 19 19





Rate   


 


Blood Pressure 146/104 146/104 146/104


 


O2 Sat by Pulse 98 95 98





Oximetry   














  08/10/21 08/10/21 08/10/21





  01:11 01:15 01:21


 


Pulse Rate 100 H 101 H 100 H


 


Respiratory 20 21 18





Rate   


 


Blood Pressure 146/104 146/104 146/104


 


O2 Sat by Pulse 95 95 97





Oximetry   














  08/10/21 08/10/21 08/10/21





  01:25 01:31 01:35


 


Pulse Rate 99 H 103 H 106 H


 


Respiratory 22 18 19





Rate   


 


Blood Pressure 153/101 153/101 153/101


 


O2 Sat by Pulse 97 96 97





Oximetry   














  08/10/21 08/10/21 08/10/21





  01:41 01:45 01:51


 


Pulse Rate 105 H 97 H 102 H


 


Respiratory 21 16 18





Rate   


 


Blood Pressure 153/101 153/101 153/101


 


O2 Sat by Pulse 98 98 98





Oximetry   














  08/10/21 08/10/21 08/10/21





  01:55 02:01 02:05


 


Pulse Rate 96 H 108 H 104 H


 


Respiratory 19 17 21





Rate   


 


Blood Pressure 148/100 148/100 148/100


 


O2 Sat by Pulse 96 97 97





Oximetry   














  08/10/21 08/10/21 08/10/21





  02:11 02:15 02:21


 


Pulse Rate 101 H 107 H 101 H


 


Respiratory 16 17 16





Rate   


 


Blood Pressure 148/100 148/100 148/100


 


O2 Sat by Pulse 98 99 98





Oximetry   














  08/10/21 08/10/21 08/10/21





  02:25 02:31 02:35


 


Pulse Rate 97 H 102 H 96 H


 


Respiratory 15 11 L 18





Rate   


 


Blood Pressure 138/90 138/90 138/90


 


O2 Sat by Pulse 97 97 97





Oximetry   














  08/10/21 08/10/21 08/10/21





  02:41 02:45 02:51


 


Pulse Rate 105 H 94 H 96 H


 


Respiratory 21 17 17





Rate   


 


Blood Pressure 138/90 138/90 138/90


 


O2 Sat by Pulse 96 97 98





Oximetry   














  08/10/21 08/10/21 08/10/21





  02:55 03:01 03:05


 


Pulse Rate 96 H 95 H 101 H


 


Respiratory 17 16 15





Rate   


 


Blood Pressure 128/84 128/84 128/84


 


O2 Sat by Pulse 97 97 97





Oximetry   














  08/10/21 08/10/21 08/10/21





  03:11 03:15 03:21


 


Pulse Rate 95 H 96 H 93 H


 


Respiratory 15 15 13





Rate   


 


Blood Pressure 128/84 128/84 128/84


 


O2 Sat by Pulse 97 97 98





Oximetry   














  08/10/21 08/10/21 08/10/21





  03:25 03:31 03:35


 


Pulse Rate 93 H 96 H 107 H


 


Respiratory 15 14 15





Rate   


 


Blood Pressure 123/85 123/85 123/85


 


O2 Sat by Pulse 96 97 94





Oximetry   














  08/10/21 08/10/21 08/10/21





  03:41 03:45 03:51


 


Pulse Rate 111 H 100 H 100 H


 


Respiratory 9 L 15 18





Rate   


 


Blood Pressure 128/84 128/84 128/84


 


O2 Sat by Pulse 90 98 94





Oximetry   














  08/10/21 08/10/21 08/10/21





  03:55 04:01 04:05


 


Pulse Rate 98 H 106 H 96 H


 


Respiratory 19 19 19





Rate   


 


Blood Pressure 158/103 158/103 158/103


 


O2 Sat by Pulse 93 94 98





Oximetry   














  08/10/21 08/10/21 08/10/21





  04:11 04:15 04:21


 


Pulse Rate 101 H 101 H 110 H


 


Respiratory 20 20 19





Rate   


 


Blood Pressure 158/103 158/103 158/103


 


O2 Sat by Pulse 94 95 96





Oximetry   














  08/10/21 08/10/21 08/10/21





  04:25 04:31 04:35


 


Pulse Rate 102 H 99 H 101 H


 


Respiratory 16 17 18





Rate   


 


Blood Pressure 148/101 148/101 148/101


 


O2 Sat by Pulse 97 96 96





Oximetry   














  08/10/21 08/10/21 08/10/21





  04:41 04:45 04:51


 


Pulse Rate 98 H 96 H 98 H


 


Respiratory 17 17 20





Rate   


 


Blood Pressure 148/101 148/101 148/101


 


O2 Sat by Pulse 98 96 94





Oximetry   














  08/10/21 08/10/21 08/10/21





  04:55 05:01 05:05


 


Pulse Rate 95 H 94 H 99 H


 


Respiratory 20 21 19





Rate   


 


Blood Pressure 158/101 158/101 158/101


 


O2 Sat by Pulse 96 98 97





Oximetry   














  08/10/21 08/10/21 08/10/21





  05:11 05:15 05:21


 


Pulse Rate 96 H 93 H 96 H


 


Respiratory 18 18 18





Rate   


 


Blood Pressure 158/101 158/101 158/101


 


O2 Sat by Pulse 98 96 98





Oximetry   














  08/10/21 08/10/21 08/10/21





  05:25 05:31 05:35


 


Pulse Rate 93 H 99 H 91 H


 


Respiratory 16 13 15





Rate   


 


Blood Pressure 143/91 143/91 143/91


 


O2 Sat by Pulse 98 97 97





Oximetry   














  08/10/21 08/10/21 08/10/21





  05:41 05:45 05:51


 


Pulse Rate 101 H 100 H 106 H


 


Respiratory 13 13 13





Rate   


 


Blood Pressure 143/91 143/91 143/91


 


O2 Sat by Pulse 96 98 99





Oximetry   














  08/10/21 08/10/21 08/10/21





  05:55 06:01 06:05


 


Pulse Rate 112 H 98 H 98 H


 


Respiratory 11 L 11 L 17





Rate   


 


Blood Pressure 141/97 141/97 141/97


 


O2 Sat by Pulse 99 98 98





Oximetry   














  08/10/21 08/10/21 08/10/21





  06:11 06:15 06:21


 


Pulse Rate 95 H 94 H 97 H


 


Respiratory 15 14 17





Rate   


 


Blood Pressure 141/97 141/97 141/97


 


O2 Sat by Pulse 97 99 98





Oximetry   














  08/10/21 08/10/21 08/10/21





  06:25 06:31 06:35


 


Pulse Rate 95 H 100 H 95 H


 


Respiratory 16 11 L 15





Rate   


 


Blood Pressure 146/102 146/102 146/102


 


O2 Sat by Pulse 98 95 98





Oximetry   














  08/10/21 08/10/21 08/10/21





  06:41 06:45 06:51


 


Pulse Rate 96 H 95 H 97 H


 


Respiratory 17 17 20





Rate   


 


Blood Pressure 146/102 141/97 141/97


 


O2 Sat by Pulse 100 98 98





Oximetry   














  08/10/21





  06:55


 


Pulse Rate 96 H


 


Respiratory 20





Rate 


 


Blood Pressure 155/107


 


O2 Sat by Pulse 98





Oximetry 














- Lab





                                 08/10/21 04:03





                                 08/10/21 04:03


                             Most recent lab results











Calcium  8.7 mg/dL (8.4-10.2)   08/10/21  04:03    


 


Phosphorus  2.50 mg/dL (2.5-4.5)   08/08/21  21:02    


 


Magnesium  1.00 mg/dL (1.7-2.3)  L  08/09/21  05:30    














Medications & Allergies





- Medications


Allergies/Adverse Reactions: 


                                    Allergies





No Known Allergies Allergy (Verified 12/01/20 07:33)


   








Home Medications: 


                                Home Medications











 Medication  Instructions  Recorded  Confirmed  Last Taken  Type


 


Pantoprazole [Protonix TAB] 40 mg PO BID #60 tablet 11/07/19 04/03/21 Unknown Rx


 


DULoxetine [Cymbalta] 30 mg PO DAILY 09/12/20 04/03/21 Unknown History


 


Apixaban [Eliquis] 5 mg PO Q48HR 12/01/20 04/03/21 Unknown History


 


Gabapentin 300 mg PO DAILY 12/01/20 04/03/21 Unknown History


 


Ondansetron [Zofran ODT TAB] 4 mg PO Q8HR 12/01/20 04/03/21 Unknown History


 


Spironolactone [Aldactone] 25 mg PO QDAY 12/01/20 04/03/21 Unknown History


 


Acetaminophen [Acetaminophen TAB] 650 mg PO Q4H PRN  tablet 12/03/20 04/03/21 

Unknown Rx


 


Magnesium Oxide [Mag-Ox] 400 mg PO QDAY 7 Days #7 tablet 12/03/20 04/03/21 

Unknown Rx


 


Metoprolol Xl [Metoprolol 50 mg PO QDAY #30 tablet 12/03/20 04/03/21 Unknown Rx





SUCCINATE ER TAB]     


 


amLODIPine 10 mg PO QDAY #30 tablet 12/03/20 04/03/21 Unknown Rx


 


levETIRAcetam [Keppra] 750 mg PO BID #60 oral.liqd 12/03/20 04/03/21 Unknown Rx


 


oxyCODONE /ACETAMINOPHEN [Percocet 1 tab PO Q6H PRN  tablet 12/03/20 04/03/21 

Unknown Rx





5/325 mg]     


 


traMADoL [Ultram 50 MG tab] 50 mg PO Q6H PRN #30 tablet 12/03/20 04/03/21 

Unknown Rx


 


Amiodarone [Cordarone 200 MG TAB] 200 mg PO BID #60 tablet 04/06/21  Unknown Rx


 


Folic Acid [Folvite] 1 mg PO QDAY #30 tablet 04/06/21  Unknown Rx


 


Thiamine [Vitamin B-1] 100 mg PO QDAY #30 tablet 04/06/21  Unknown Rx











Active Medications: 














Generic Name Dose Route Start Last Admin





  Trade Name Freq  PRN Reason Stop Dose Admin


 


Acetaminophen  650 mg  08/09/21 00:17 





  Acetaminophen 325 Mg Tab  PO  





  Q4H PRN  





  Pain MILD(1-3)/Fever >100.5/HA  


 


Amiodarone HCl  200 mg  08/09/21 10:00  08/09/21 22:19





  Amiodarone 200 Mg Tab  PO   Not Given





  BID JORDANA  


 


Amlodipine Besylate  10 mg  08/09/21 10:00  08/09/21 14:55





  Amlodipine 10 Mg Tab  PO   10 mg





  QDAY JORDANA   Administration


 


Apixaban  5 mg  08/09/21 10:00  08/09/21 12:47





  Apixaban 5 Mg Tab  PO   5 mg





  Q48HR JORDANA   Administration


 


Duloxetine HCl  30 mg  08/09/21 10:00  08/09/21 12:47





  Duloxetine 30 Mg Cap  PO   30 mg





  DAILY JORDANA   Administration


 


Folic Acid  1 mg  08/09/21 10:00  08/09/21 12:49





  Folic Acid 1 Mg Tab  PO   1 mg





  QDAY JORDANA   Administration


 


Gabapentin  300 mg  08/09/21 10:00  08/09/21 12:48





  Gabapentin 300 Mg Cap  PO   300 mg





  DAILY JORDANA   Administration


 


Haloperidol Lactate  5 mg  08/09/21 00:17 





  Haloperidol Lactate 5 Mg/1 Ml Inj  IV  





  Q1H PRN  





  Unrespon. to mult. doses BZD's  


 


Sodium Chloride  1,000 mls @ 125 mls/hr  08/09/21 00:30 





  Nacl 0.9% 1000 Ml  IV  





  AS DIRECT JORDANA  


 


Amiodarone HCl 900 mg/  500 mls @ 33.333 mls/hr  08/09/21 17:00  08/09/21 23:00





  Dextrose  IV   0.5 mg/min





  AS DIRECT JORDANA   16.667 mls/hr





    Titration





  Protocol  





  1 MG/MIN  


 


Magnesium Sulfate  4 gm in 100 mls @ 25 mls/hr  08/10/21 08:00 





  Magnesium Sulfate 4gm/100ml  IV  08/10/21 11:59 





  ONCE ONE  


 


Potassium Chloride  10 meq in 100 mls @ 100 mls/hr  08/10/21 08:00 





  Kcl 10meq/100ml  IV  08/10/21 10:59 





  Q1H JORDANA  


 


Levetiracetam  750 mg  08/09/21 10:00  08/09/21 22:14





  Levetiracetam 500 Mg/5 Ml Oral Liqd  PO   750 mg





  BID JORDANA   Administration


 


Lorazepam  2 mg  08/09/21 00:17  08/09/21 16:06





  Lorazepam 2 Mg/Ml Vial  IV   2 mg





  Q1H PRN   Administration





  CIWA-Ar 8-15  


 


Metoclopramide HCl  5 mg  08/09/21 12:00  08/10/21 06:25





  Metoclopramide 10 Mg/2 Ml Inj  IV   5 mg





  Q6H JORDANA   Administration


 


Metoprolol Succinate  50 mg  08/09/21 08:00  08/09/21 18:20





  Metoprolol Succinate Xl 50 Mg Tab  PO   Not Given





  QDAY@0800 JORDANA  


 


Morphine Sulfate  2 mg  08/09/21 12:00 





  Morphine 2 Mg/1 Ml Inj  IV  





  Q4H PRN  





  Pain , Severe (7-10)  


 


Naloxone HCl  0.1 mg  08/09/21 00:17 





  Naloxone 0.4 Mg/1 Ml Inj  IV  





  Q2MIN PRN  





  Res Rate </= 8 or 02 SAT < 92%  


 


Ondansetron HCl  4 mg  08/09/21 00:17  08/09/21 02:49





  Ondansetron 4 Mg/2 Ml Inj  IV   4 mg





  Q8H PRN   Administration





  Nausea And Vomiting  


 


Pantoprazole Sodium  40 mg  08/09/21 10:00  08/09/21 22:14





  Pantoprazole 40 Mg Inj  IV   40 mg





  BID JORDANA   Administration


 


Scopolamine  1 each  08/09/21 13:00  08/09/21 14:54





  Scopolamine Transdermal Patch 72 Hr  TD   1 each





  Q3D JORDANA   Administration


 


Sodium Chloride  10 ml  08/09/21 10:00  08/09/21 22:18





  Sodium Chloride 0.9% 10 Ml Flush Syringe  IV   10 ml





  BID JORDANA   Administration


 


Sodium Chloride  10 ml  08/09/21 00:17 





  Sodium Chloride 0.9% 10 Ml Flush Syringe  IV  





  PRN PRN  





  LINE FLUSH  


 


Sucralfate  1 gm  08/09/21 18:00  08/10/21 06:25





  Sucralfate 1 Gm/10 Ml Oral Liqd  PO   1 gm





  Q6HR JORDANA   Administration

## 2021-08-10 NOTE — ELECTROCARDIOGRAPH REPORT
[facility]

                                       

Test Date:    2021               Test Time:    14:22:31

Pat Name:     REJI JJ           Department:   

Patient ID:   [id number]          Room:         [place] [address]

Gender:       M                        Technician:   FELICIANO

:          1960               Requested By: DARCY CABRALES

Order Number: [id number]              Reading MD:   Jonnie Yoo

                                 Measurements

Intervals                              Axis          

Rate:         167                      P:            

WV:                                    QRS:          -80

QRSD:         99                       T:            86

QT:           310                                    

QTc:          520                                    

                           Interpretive Statements

Atrial fibrillation with rapid V-rate

Left anterior fascicular block

nonspecific st-t 

Compared to ECG 2021 21:02:52

Electronically Signed On 8- 10:09:59 EDT by Jonnie Yoo

## 2021-08-11 LAB
ALBUMIN SERPL-MCNC: 3 G/DL (ref 3.9–5)
ALT SERPL-CCNC: 24 UNITS/L (ref 7–56)
BUN SERPL-MCNC: 29 MG/DL (ref 9–20)
BUN/CREAT SERPL: 11 %
CALCIUM SERPL-MCNC: 8.6 MG/DL (ref 8.4–10.2)
HCT VFR BLD CALC: 28.7 % (ref 35.5–45.6)
HEMOLYSIS INDEX: 8
HGB BLD-MCNC: 9.5 GM/DL (ref 11.8–15.2)
MCHC RBC AUTO-ENTMCNC: 33 % (ref 32–34)
MCV RBC AUTO: 87 FL (ref 84–94)
PLATELET # BLD: 112 K/MM3 (ref 140–440)
RBC # BLD AUTO: 3.28 M/MM3 (ref 3.65–5.03)

## 2021-08-11 RX ADMIN — METOCLOPRAMIDE SCH MG: 5 INJECTION, SOLUTION INTRAMUSCULAR; INTRAVENOUS at 00:23

## 2021-08-11 RX ADMIN — METOPROLOL TARTRATE SCH MG: 50 TABLET, FILM COATED ORAL at 09:58

## 2021-08-11 RX ADMIN — SUCRALFATE ORAL SCH GM: 1 SUSPENSION ORAL at 15:10

## 2021-08-11 RX ADMIN — Medication SCH ML: at 09:58

## 2021-08-11 RX ADMIN — METOCLOPRAMIDE SCH MG: 5 INJECTION, SOLUTION INTRAMUSCULAR; INTRAVENOUS at 05:39

## 2021-08-11 RX ADMIN — PANTOPRAZOLE SODIUM SCH MG: 40 INJECTION, POWDER, FOR SOLUTION INTRAVENOUS at 09:58

## 2021-08-11 RX ADMIN — AMIODARONE HYDROCHLORIDE SCH MG: 200 TABLET ORAL at 09:58

## 2021-08-11 RX ADMIN — METOCLOPRAMIDE SCH MG: 5 INJECTION, SOLUTION INTRAMUSCULAR; INTRAVENOUS at 18:55

## 2021-08-11 RX ADMIN — Medication SCH ML: at 22:30

## 2021-08-11 RX ADMIN — POTASSIUM & SODIUM PHOSPHATES POWDER PACK 280-160-250 MG SCH EACH: 280-160-250 PACK at 15:10

## 2021-08-11 RX ADMIN — ONDANSETRON PRN MG: 2 INJECTION INTRAMUSCULAR; INTRAVENOUS at 10:09

## 2021-08-11 RX ADMIN — METOCLOPRAMIDE SCH MG: 5 INJECTION, SOLUTION INTRAMUSCULAR; INTRAVENOUS at 15:10

## 2021-08-11 RX ADMIN — SUCRALFATE ORAL SCH GM: 1 SUSPENSION ORAL at 05:39

## 2021-08-11 RX ADMIN — FOLIC ACID SCH MG: 1 TABLET ORAL at 09:57

## 2021-08-11 RX ADMIN — SUCRALFATE ORAL SCH GM: 1 SUSPENSION ORAL at 00:23

## 2021-08-11 RX ADMIN — SUCRALFATE ORAL SCH GM: 1 SUSPENSION ORAL at 18:57

## 2021-08-11 RX ADMIN — APIXABAN SCH MG: 5 TABLET, FILM COATED ORAL at 09:57

## 2021-08-11 RX ADMIN — POTASSIUM & SODIUM PHOSPHATES POWDER PACK 280-160-250 MG SCH EACH: 280-160-250 PACK at 22:30

## 2021-08-11 RX ADMIN — METOPROLOL TARTRATE SCH MG: 50 TABLET, FILM COATED ORAL at 22:30

## 2021-08-11 RX ADMIN — POTASSIUM CHLORIDE SCH MEQ: 1500 TABLET, EXTENDED RELEASE ORAL at 10:09

## 2021-08-11 RX ADMIN — PANTOPRAZOLE SODIUM SCH MG: 40 INJECTION, POWDER, FOR SOLUTION INTRAVENOUS at 22:30

## 2021-08-11 NOTE — PROGRESS NOTE
Assessment and Plan


Assessment and plan: 





This is 60-year-old male with HTN, DM, chronic EtOH abuse, p Olivarez's 

esophagitis, hiatal hernia, seizure disorder, A. fib, HFr EF, cardiomegaly s/p 

dual chamber AICD admitted with acute kidney injury, elevated LFT, hypokalemia, 

hypomagnesemia, A. fib with RVR





Neuro: h/o seizure disorder, h/o EtOH abuse, alcoholic withdrawal


-CIWA protocol, prn ativan


-Reorientation as needed


-Aspiration/fall/seizure precautions


-Thiamine, folic acid


-As needed Haldol


-8/11: Ordered restraints due to agitation and patient wandering today on floor.

Hallucination, fluctuating orientation on exam.





Cardio: A. fib RVR, HTN, A. fib, nonischemic cardiomegaly s/p dual-chamber AICD,

HFrEF


-Cardiology consulted, appreciate recommendations


-3/2017 cardiac cath demonstrated normal coronary arteries with a ventricular e

jection fraction of 20 to 25%


-5/2021 echocardiogram shows EF of 35 to 40%


-Blood pressure monitoring per protocol


-Amiodarone 200 mg daily, amlodipine, metoprolol


-Continue home Eliquis for anticoagulation





Respiratory: NAD


-Supplemental oxygen as needed


-Pulmonary hygiene


-SPO2 monitoring





GI: Acute on chronic abdominal pain, elevated LFTs, h/o GERD, esophagitis, 

hiatal hernia, long segment Olivarez's esophagus


-GI consulted, appreciate recommendations


-5/2020 MRCP-see GI notes for details


-6/24/2021 EGD at OSH-see GI notes for details


-PPI


-Carafate 3 times daily


-As needed Reglan


-As needed zofran


-Consistent carbohydrate cardiac renal diet





: Acute kidney injury, hypokalemia, hypomagnesemia, metabolic alkalosis, 

hyponatremia


-Nephrology consulted, appreciate recommendations


-MIVF per nephrology


-Renal ultrasound within normal limits-see results


-Repeat electrolytes aggressively


-Avoid nephrotoxic medications


-Renally dose medications


-History continue nephro


-Daily weights





Endo: NAD


-Patient has a history of " diabetes mellitus" per charting


-Hemoglobin A1c 4.8


-Patient was on Accu-Cheks every 6 while n.p.o.


-Avoid hypoglycemia





ID: NAD


-Monitor for signs or symptoms of infection


-Patient has been afebrile





Heme: NAD


-Trend CBC


-Transfuse to hemoglobin less than 7


-Patient is on Eliquis for A. fib


-PPI


-SCDs to bilateral lower extremities while in bed





8/11/2021: Patient very nauseous this morning on a.m. encounter.  Told nurse to 

give additional Zofran to patient.  He can be continued on Protonix and 

sucralfate as suggested by GI.  Discussed patient case with cardiology.  They 

recommended continued amiodarone 200 mg daily and metoprolol for atrial 

fibrillation.  In afternoon, was paged about patient agitation and 

disorientation.  Patient was seen walking in the hallways and stated that he 

wanted to go home.  RN redirected patient back to bed,  restraints were 

requested and ordered.  Patient is on CIWA protocol with as needed Ativan on 

board.  





History


Interval history: 





Nauseaus this AM on encounter. Had one episode of emesis on encounter. Patient 

appeared drowsy but was answering questioning appropriately. 





paged in afternoon regarding patient disorientation. patient was insisting on 

going home.  Patient was agitated and more disoriented, appeared to be 

withdrawing.  RN requested restraints, order placed.





Hospitalist Physical





- Physical exam


Narrative exam: 





Physical Exam:


Constitutional: appears drowsy, cooperative.  Appeared ill.  Subsequent 

encounter patient was more agitated and disoriented


Head, Ears, Nose: Normocephalic, atraumatic. External ears, nose normal


Eyes: Conjunctivae/corneas clear. No icterus. No ptosis.


Neck: Supple, no meningeal signs


Oral: dentition fair, no thrush


Cardiovascular: S1, S2 normal. 


Respiratory: Good air entry, clear to auscultation bilaterally


GI: Soft,tender to palpation; bowel sounds normal. No peritoneal signs. 


Musculoskeletal: No pedal edema, no cyanosis.


Skin: No rash or abscess


Hem/Lymphatic: No palpable cervical or supraclavicular nodes. No lymphangitis


Psych: Mood ok. Affect normal


Neurological: Awake, alert, oriented x3 initially, oriented x1 on subsequent 

encounter. No gross abnormality





- Constitutional


Vitals: 


                                        











Temp Pulse Resp BP Pulse Ox


 


 98.6 F   87   94 H  127/78   97 


 


 08/11/21 04:57  08/11/21 09:57  08/11/21 04:57  08/11/21 09:57  08/11/21 10:00











General appearance: Present: no acute distress





Results





- Labs


CBC & Chem 7: 


                                 08/11/21 03:43





                                 08/11/21 03:43


Labs: 


                             Laboratory Last Values











WBC  3.6 K/mm3 (4.5-11.0)  L  08/11/21  03:43    


 


RBC  3.28 M/mm3 (3.65-5.03)  L  08/11/21  03:43    


 


Hgb  9.5 gm/dl (11.8-15.2)  L  08/11/21  03:43    


 


Hct  28.7 % (35.5-45.6)  L  08/11/21  03:43    


 


MCV  87 fl (84-94)   08/11/21  03:43    


 


MCH  29 pg (28-32)   08/11/21  03:43    


 


MCHC  33 % (32-34)   08/11/21  03:43    


 


RDW  20.2 % (13.2-15.2)  H  08/11/21  03:43    


 


Plt Count  112 K/mm3 (140-440)  L  08/11/21  03:43    


 


Lymph % (Auto)  16.1 % (13.4-35.0)   08/10/21  04:03    


 


Mono % (Auto)  15.4 % (0.0-7.3)  H  08/10/21  04:03    


 


Eos % (Auto)  0.1 % (0.0-4.3)   08/10/21  04:03    


 


Baso % (Auto)  0.2 % (0.0-1.8)   08/10/21  04:03    


 


Lymph # (Auto)  0.8 K/mm3 (1.2-5.4)  L  08/10/21  04:03    


 


Mono # (Auto)  0.7 K/mm3 (0.0-0.8)   08/10/21  04:03    


 


Eos # (Auto)  0.0 K/mm3 (0.0-0.4)   08/10/21  04:03    


 


Baso # (Auto)  0.0 K/mm3 (0.0-0.1)   08/10/21  04:03    


 


Seg Neutrophils %  68.2 % (40.0-70.0)   08/10/21  04:03    


 


Seg Neutrophils #  3.3 K/mm3 (1.8-7.7)   08/10/21  04:03    


 


PT  13.9 Sec. (12.2-14.9)   08/08/21  21:02    


 


INR  1.02  (0.87-1.13)   08/08/21  21:02    


 


APTT  28.6 Sec. (24.2-36.6)   08/08/21  21:02    


 


Sodium  127 mmol/L (137-145)  L  08/11/21  03:43    


 


Potassium  3.5 mmol/L (3.6-5.0)  L  08/11/21  03:43    


 


Chloride  86.9 mmol/L ()  L  08/11/21  03:43    


 


Carbon Dioxide  30 mmol/L (22-30)   08/11/21  03:43    


 


Anion Gap  14 mmol/L  08/11/21  03:43    


 


BUN  29 mg/dL (9-20)  H  08/11/21  03:43    


 


Creatinine  2.6 mg/dL (0.8-1.3)  H  08/11/21  03:43    


 


Estimated GFR  30 ml/min  08/11/21  03:43    


 


BUN/Creatinine Ratio  11 %  08/11/21  03:43    


 


Glucose  95 mg/dL ()   08/11/21  03:43    


 


Hemoglobin A1c  4.8 % (4-6)   08/10/21  04:03    


 


Calcium  8.6 mg/dL (8.4-10.2)   08/11/21  03:43    


 


Phosphorus  1.70 mg/dL (2.5-4.5)  L  08/11/21  03:43    


 


Magnesium  2.40 mg/dL (1.7-2.3)  H  08/11/21  03:43    


 


Total Bilirubin  0.70 mg/dL (0.1-1.2)   08/11/21  03:43    


 


Direct Bilirubin  0.6 mg/dL (0-0.2)  H  08/10/21  04:03    


 


Indirect Bilirubin  0.8 mg/dL  08/10/21  04:03    


 


AST  59 units/L (5-40)  H  08/11/21  03:43    


 


ALT  24 units/L (7-56)   08/11/21  03:43    


 


Alkaline Phosphatase  107 units/L ()   08/11/21  03:43    


 


Total Protein  5.7 g/dL (6.3-8.2)  L  08/11/21  03:43    


 


Albumin  3.0 g/dL (3.9-5)  L  08/11/21  03:43    


 


Albumin/Globulin Ratio  1.1 %  08/11/21  03:43    


 


Lipase  20 units/L (13-60)   08/08/21  19:52    


 


Plasma/Serum Alcohol  0.18 % (0-0.07)  H  08/08/21  21:02    














Active Medications





- Current Medications


Current Medications: 














Generic Name Dose Route Start Last Admin





  Trade Name Freq  PRN Reason Stop Dose Admin


 


Acetaminophen  650 mg  08/09/21 00:17 





  Acetaminophen 325 Mg Tab  PO  





  Q4H PRN  





  Pain MILD(1-3)/Fever >100.5/HA  


 


Amiodarone HCl  200 mg  08/11/21 10:00  08/11/21 09:58





  Amiodarone 200 Mg Tab  PO   200 mg





  QDAY JORDANA   Administration


 


Amlodipine Besylate  10 mg  08/09/21 10:00  08/11/21 09:57





  Amlodipine 10 Mg Tab  PO   10 mg





  QDAY JORDANA   Administration


 


Apixaban  5 mg  08/09/21 10:00  08/11/21 09:57





  Apixaban 5 Mg Tab  PO   5 mg





  Q48HR JORDANA   Administration


 


Dextrose  50 ml  08/10/21 12:24 





  Dextrose 50% In Water (25gm) 50 Ml Syringe  IV  





  Q30MIN PRN  





  Hypoglycemia  





  Protocol  


 


Folic Acid  1 mg  08/11/21 10:00  08/11/21 09:57





  Folic Acid 1 Mg Tab  PO   1 mg





  QDAY JORDANA   Administration


 


Haloperidol Lactate  5 mg  08/09/21 00:17 





  Haloperidol Lactate 5 Mg/1 Ml Inj  IV  





  Q1H PRN  





  Unrespon. to mult. doses BZD's  


 


Hydralazine HCl  10 mg  08/10/21 10:05 





  Hydralazine 20 Mg/1 Ml Inj  IV  





  Q4HR PRN  





  Hypertension  


 


Chlorpromazine HCl 25 mg/  51 mls @ 100 mls/hr  08/11/21 11:00  08/11/21 13:55





  Sodium Chloride  IV   100 mls/hr





  Q4H PRN   Administration





  Hiccups  


 


Levetiracetam  750 mg  08/10/21 22:00  08/11/21 09:58





  Levetiracetam 500 Mg Tab  PO   750 mg





  BID JORDANA   Administration


 


Lorazepam  2 mg  08/09/21 00:17  08/11/21 10:09





  Lorazepam 2 Mg/Ml Vial  IV   2 mg





  Q1H PRN   Administration





  NOA-Ar 8-15  


 


Metoclopramide HCl  5 mg  08/09/21 12:00  08/11/21 15:10





  Metoclopramide 10 Mg/2 Ml Inj  IV   5 mg





  Q6H JORDANA   Administration


 


Metoprolol Tartrate  50 mg  08/10/21 22:00  08/11/21 09:58





  Metoprolol Tartrate 50 Mg Tab  PO   50 mg





  BID JORDANA   Administration


 


Morphine Sulfate  2 mg  08/09/21 12:00 





  Morphine 2 Mg/1 Ml Inj  IV  





  Q4H PRN  





  Pain , Severe (7-10)  


 


Naloxone HCl  0.1 mg  08/09/21 00:17 





  Naloxone 0.4 Mg/1 Ml Inj  IV  





  Q2MIN PRN  





  Res Rate </= 8 or 02 SAT < 92%  


 


Ondansetron HCl  4 mg  08/09/21 00:17  08/11/21 10:09





  Ondansetron 4 Mg/2 Ml Inj  IV   4 mg





  Q8H PRN   Administration





  Nausea And Vomiting  


 


Pantoprazole Sodium  40 mg  08/09/21 10:00  08/11/21 09:58





  Pantoprazole 40 Mg Inj  IV   40 mg





  BID JORDANA   Administration


 


Potassium Chloride  20 meq  08/11/21 10:00  08/11/21 10:09





  Potassium Chloride Er 20 Meq Tab  PO   20 meq





  QDAY JORDANA   Administration


 


Potassium Phos/Sodium Phos  1 each  08/11/21 14:00  08/11/21 15:10





  Phos-Nak Powder Packet  PO   1 each





  Q8HR JORDANA   Administration


 


Scopolamine  1 each  08/09/21 13:00  08/09/21 14:54





  Scopolamine Transdermal Patch 72 Hr  TD   1 each





  Q3D JORDANA   Administration


 


Sodium Chloride  10 ml  08/09/21 10:00  08/11/21 09:58





  Sodium Chloride 0.9% 10 Ml Flush Syringe  IV   10 ml





  BID JORDANA   Administration


 


Sodium Chloride  10 ml  08/09/21 00:17 





  Sodium Chloride 0.9% 10 Ml Flush Syringe  IV  





  PRN PRN  





  LINE FLUSH  


 


Sucralfate  1 gm  08/09/21 18:00  08/11/21 15:10





  Sucralfate 1 Gm/10 Ml Oral Liqd  PO   1 gm





  Q6HR JORDANA   Administration

## 2021-08-11 NOTE — PROGRESS NOTE
Assessment and Plan





# Acute Kidney Injury: suspect pre-renal injury with dehydration, alcohol use.  

Similar presentation in past, with creatinine peaking to 4.9 before improving to

1.1 with IVF.  Creatinine 4.6->4.8->4.2->2.6 with supportive measures


- will hold IVF for now given improvement and eating/hydrating po, encourage PO 

hydration as tolerated and allowed


- reviewed prior workup for imaging, serologies; ultrasound WNL


- strict Is/Os 


- replete lytes aggressively


- renally dose meds


- avoid nephrotoxins- consider alternative to PPI if able given AIN risk


- no immediate indication for renal replacement therapy or biopsy


- holding MRA for now given HERMELINDA, hypotension





# Hypokalemia: due to alcohol use, replete; improving to 3.5; continue with po 

KCl for now





# Hypomagnesemia: due to above, replete prn





# Hypophosphatemia: risk for refeeding syndrome, start Neutraphos TID for now





# Metabolic Alkalosis: due to vomiting, contraction, improving





# Hyponatremia: due to alcohol use, dehydration, improving with IVF





# Abdominal pain: reviewed CT which shows hiatal hernia, appreciate surgery 

input





# Alcohol abuse





# A-Fib/Tachycardia: on BB





# CHF: note prior echo, hold MRA for now, monitor volume status with IVF 





# HTN: BP now at goal after running low initially, monitor closely given HERMELINDA; on

amlodipine, BB








Subjective


Date of service: 08/11/21


Interval history: 





Eating breakfast this AM, does not want cardiac diet.  Notes ongoing abdominal 

pain.  Breathing well.





Objective





- Exam


Narrative Exam: 





General appearance: well-developed, well-nourished, no distress


EENT: ATNC


Neck: Present: neck supple


Respiratory: Clear to Ascultation


Heart: regular, S1S2


Gastrointestinal: Present: normoactive bowel sounds, tenderness


Integumentary: no rash, warm and dry


Neurologic: no focal deficit, alert and oriented x3


Psychiatric: mood/affect appropriate





- Vital Signs


Vital signs: 


                               Vital Signs - 12hr











  08/10/21 08/11/21





  23:11 04:57


 


Temperature 98.0 F 98.6 F


 


Pulse Rate 82 72


 


Respiratory 18 94 H





Rate  


 


Blood Pressure 95/71 


 


Blood Pressure  121/64





[Left]  


 


O2 Sat by Pulse 94 





Oximetry  














- Lab





                                 08/11/21 03:43





                                 08/11/21 03:43


                             Most recent lab results











Calcium  8.6 mg/dL (8.4-10.2)   08/11/21  03:43    


 


Phosphorus  1.70 mg/dL (2.5-4.5)  L  08/11/21  03:43    


 


Magnesium  2.40 mg/dL (1.7-2.3)  H  08/11/21  03:43    














Medications & Allergies





- Medications


Allergies/Adverse Reactions: 


                                    Allergies





No Known Allergies Allergy (Verified 12/01/20 07:33)


   








Home Medications: 


                                Home Medications











 Medication  Instructions  Recorded  Confirmed  Last Taken  Type


 


Pantoprazole [Protonix TAB] 40 mg PO BID #60 tablet 11/07/19 04/03/21 Unknown Rx


 


DULoxetine [Cymbalta] 30 mg PO DAILY 09/12/20 04/03/21 Unknown History


 


Apixaban [Eliquis] 5 mg PO Q48HR 12/01/20 04/03/21 Unknown History


 


Gabapentin 300 mg PO DAILY 12/01/20 04/03/21 Unknown History


 


Ondansetron [Zofran ODT TAB] 4 mg PO Q8HR 12/01/20 04/03/21 Unknown History


 


Spironolactone [Aldactone] 25 mg PO QDAY 12/01/20 04/03/21 Unknown History


 


Acetaminophen [Acetaminophen TAB] 650 mg PO Q4H PRN  tablet 12/03/20 04/03/21 

Unknown Rx


 


Magnesium Oxide [Mag-Ox] 400 mg PO QDAY 7 Days #7 tablet 12/03/20 04/03/21 

Unknown Rx


 


Metoprolol Xl [Metoprolol 50 mg PO QDAY #30 tablet 12/03/20 04/03/21 Unknown Rx





SUCCINATE ER TAB]     


 


amLODIPine 10 mg PO QDAY #30 tablet 12/03/20 04/03/21 Unknown Rx


 


levETIRAcetam [Keppra] 750 mg PO BID #60 oral.liqd 12/03/20 04/03/21 Unknown Rx


 


oxyCODONE /ACETAMINOPHEN [Percocet 1 tab PO Q6H PRN  tablet 12/03/20 04/03/21 

Unknown Rx





5/325 mg]     


 


traMADoL [Ultram 50 MG tab] 50 mg PO Q6H PRN #30 tablet 12/03/20 04/03/21 U

nknown Rx


 


Amiodarone [Cordarone 200 MG TAB] 200 mg PO BID #60 tablet 04/06/21  Unknown Rx


 


Folic Acid [Folvite] 1 mg PO QDAY #30 tablet 04/06/21  Unknown Rx


 


Thiamine [Vitamin B-1] 100 mg PO QDAY #30 tablet 04/06/21  Unknown Rx











Active Medications: 














Generic Name Dose Route Start Last Admin





  Trade Name Freq  PRN Reason Stop Dose Admin


 


Acetaminophen  650 mg  08/09/21 00:17 





  Acetaminophen 325 Mg Tab  PO  





  Q4H PRN  





  Pain MILD(1-3)/Fever >100.5/HA  


 


Amiodarone HCl  200 mg  08/11/21 10:00 





  Amiodarone 200 Mg Tab  PO  





  QDAY JORDANA  


 


Amlodipine Besylate  10 mg  08/09/21 10:00  08/10/21 10:49





  Amlodipine 10 Mg Tab  PO   10 mg





  QDAY JORDANA   Administration


 


Apixaban  5 mg  08/09/21 10:00  08/09/21 12:47





  Apixaban 5 Mg Tab  PO   5 mg





  Q48HR JORDANA   Administration


 


Dextrose  50 ml  08/10/21 12:24 





  Dextrose 50% In Water (25gm) 50 Ml Syringe  IV  





  Q30MIN PRN  





  Hypoglycemia  





  Protocol  


 


Folic Acid  1 mg  08/11/21 10:00 





  Folic Acid 1 Mg Tab  PO  





  QDAY JORDANA  


 


Haloperidol Lactate  5 mg  08/09/21 00:17 





  Haloperidol Lactate 5 Mg/1 Ml Inj  IV  





  Q1H PRN  





  Unrespon. to mult. doses BZD's  


 


Hydralazine HCl  10 mg  08/10/21 10:05 





  Hydralazine 20 Mg/1 Ml Inj  IV  





  Q4HR PRN  





  Hypertension  


 


Levetiracetam  750 mg  08/10/21 22:00  08/10/21 22:12





  Levetiracetam 500 Mg Tab  PO   750 mg





  BID JORDANA   Administration


 


Lorazepam  2 mg  08/09/21 00:17  08/10/21 22:37





  Lorazepam 2 Mg/Ml Vial  IV   2 mg





  Q1H PRN   Administration





  CIWA-Ar 8-15  


 


Metoclopramide HCl  5 mg  08/09/21 12:00  08/11/21 05:39





  Metoclopramide 10 Mg/2 Ml Inj  IV   5 mg





  Q6H JORDANA   Administration


 


Metoprolol Tartrate  50 mg  08/10/21 22:00  08/10/21 22:12





  Metoprolol Tartrate 50 Mg Tab  PO   50 mg





  BID JORDANA   Administration


 


Morphine Sulfate  2 mg  08/09/21 12:00 





  Morphine 2 Mg/1 Ml Inj  IV  





  Q4H PRN  





  Pain , Severe (7-10)  


 


Naloxone HCl  0.1 mg  08/09/21 00:17 





  Naloxone 0.4 Mg/1 Ml Inj  IV  





  Q2MIN PRN  





  Res Rate </= 8 or 02 SAT < 92%  


 


Ondansetron HCl  4 mg  08/09/21 00:17  08/09/21 02:49





  Ondansetron 4 Mg/2 Ml Inj  IV   4 mg





  Q8H PRN   Administration





  Nausea And Vomiting  


 


Pantoprazole Sodium  40 mg  08/09/21 10:00  08/10/21 22:11





  Pantoprazole 40 Mg Inj  IV   40 mg





  BID JORDANA   Administration


 


Potassium Chloride  20 meq  08/11/21 10:00 





  Potassium Chloride Er 20 Meq Tab  PO  





  QDAY JORDANA  


 


Potassium Phos/Sodium Phos  1 each  08/11/21 14:00 





  Phos-Nak Powder Packet  PO  





  Q8HR JORDANA  


 


Scopolamine  1 each  08/09/21 13:00  08/09/21 14:54





  Scopolamine Transdermal Patch 72 Hr  TD   1 each





  Q3D JORDANA   Administration


 


Sodium Chloride  10 ml  08/09/21 10:00  08/10/21 22:12





  Sodium Chloride 0.9% 10 Ml Flush Syringe  IV   10 ml





  BID JORDANA   Administration


 


Sodium Chloride  10 ml  08/09/21 00:17 





  Sodium Chloride 0.9% 10 Ml Flush Syringe  IV  





  PRN PRN  





  LINE FLUSH  


 


Sucralfate  1 gm  08/09/21 18:00  08/11/21 05:39





  Sucralfate 1 Gm/10 Ml Oral Liqd  PO   1 gm





  Q6HR JORDANA   Administration

## 2021-08-11 NOTE — PROGRESS NOTE
Assessment and Plan





- Patient Problems


(1) Rapid atrial fibrillation


Current Visit: Yes   Status: Acute   


Plan to address problem: 


Continue medical therapy as outlined for paroxysmal atrial fibrillation.








(2) Nonischemic cardiomyopathy


Current Visit: Yes   Status: Acute   


Plan to address problem: 


Guideline directed medical therapy for nonischemic cardiomyopathy and chronic 

left ventricular systolic failure.








(3) Alcohol abuse


Current Visit: Yes   Status: Acute   


Plan to address problem: 


Patient has a longstanding and continued alcohol abuse, will defer to internal 

medicine for directed strategies at management of alcohol abuse.








Subjective


Date of service: 08/11/21


Interval history: 





Patient has no new cardiac complaints, has maintained a stable sinus rhythm.  He

is on CIWA protocol.





Objective


                                   Vital Signs











  Temp Pulse Resp BP BP Pulse Ox


 


 08/11/21 09:57   87   127/78  


 


 08/11/21 04:57  98.6 F  72  94 H   121/64 


 


 08/10/21 23:11  98.0 F  82  18  95/71   94


 


 08/10/21 20:00       98


 


 08/10/21 19:57  98.6 F  68  18   141/68  99


 


 08/10/21 19:48   88    


 


 08/10/21 19:23  98.3 F  88  20  91/63   98


 


 08/10/21 18:31   87  11 L  102/58   100


 


 08/10/21 18:01   89  13  124/85   100


 


 08/10/21 17:31   95 H  19  133/86   99


 


 08/10/21 17:01   84  13  123/89   98


 


 08/10/21 16:31   85  17  123/90   98


 


 08/10/21 16:01   85  15  123/92   98


 


 08/10/21 15:31   83  13  122/80   99


 


 08/10/21 15:01   82  16  121/76   98


 


 08/10/21 14:31   85  15  122/80   98


 


 08/10/21 14:01   85  15  114/83   97


 


 08/10/21 13:31   86  18  119/80   98


 


 08/10/21 13:09  98.7 F  88  16   107/74  99


 


 08/10/21 13:01   89  18  107/74   99


 


 08/10/21 12:31   93 H  20  131/82   98


 


 08/10/21 12:01   94 H  16  146/89   100


 


 08/10/21 11:34   109 H   147/92  


 


 08/10/21 11:31   108 H  12  147/92   99


 


 08/10/21 11:01   93 H  11 L  140/86   99


 


 08/10/21 10:49   96 H   128/86  


 


 08/10/21 10:35   95 H  13  128/86   99














- Physical Examination


General: No Apparent Distress


HEENT: Positive: PERRL


Neck: Positive: neck supple


Cardiac: Positive: Reg Rate and Rhythm


Lungs: Positive: Decreased Breath Sounds


Neuro: Positive: Grossly Intact


Abdomen: Positive: Soft


Skin: Positive: Clear


Extremities: Absent: edema





- Labs and Meds


                                 Cardiac Enzymes











  08/11/21 Range/Units





  03:43 


 


AST  59 H  (5-40)  units/L








                                       CBC











  08/11/21 Range/Units





  03:43 


 


WBC  3.6 L  (4.5-11.0)  K/mm3


 


RBC  3.28 L  (3.65-5.03)  M/mm3


 


Hgb  9.5 L  (11.8-15.2)  gm/dl


 


Hct  28.7 L  (35.5-45.6)  %


 


Plt Count  112 L  (140-440)  K/mm3








                          Comprehensive Metabolic Panel











  08/11/21 Range/Units





  03:43 


 


Sodium  127 L  (137-145)  mmol/L


 


Potassium  3.5 L  (3.6-5.0)  mmol/L


 


Chloride  86.9 L  ()  mmol/L


 


Carbon Dioxide  30  (22-30)  mmol/L


 


BUN  29 H  (9-20)  mg/dL


 


Creatinine  2.6 H  (0.8-1.3)  mg/dL


 


Glucose  95  ()  mg/dL


 


Calcium  8.6  (8.4-10.2)  mg/dL


 


AST  59 H  (5-40)  units/L


 


ALT  24  (7-56)  units/L


 


Alkaline Phosphatase  107  ()  units/L


 


Total Protein  5.7 L  (6.3-8.2)  g/dL


 


Albumin  3.0 L  (3.9-5)  g/dL

## 2021-08-12 LAB
ALBUMIN SERPL-MCNC: 2.7 G/DL (ref 3.9–5)
ALT SERPL-CCNC: 24 UNITS/L (ref 7–56)
ANISOCYTOSIS BLD QL SMEAR: (no result)
BAND NEUTROPHILS # (MANUAL): 0 K/MM3
BUN SERPL-MCNC: 25 MG/DL (ref 9–20)
BUN/CREAT SERPL: 15 %
CALCIUM SERPL-MCNC: 8.7 MG/DL (ref 8.4–10.2)
EOSINOPHIL NFR BLD AUTO: 1.7 % (ref 0–4.3)
HCT VFR BLD CALC: 28.2 % (ref 35.5–45.6)
HEMOLYSIS INDEX: 18
HGB BLD-MCNC: 9.4 GM/DL (ref 11.8–15.2)
MCHC RBC AUTO-ENTMCNC: 33 % (ref 32–34)
MCV RBC AUTO: 88 FL (ref 84–94)
MONOCYTES # (AUTO): 1 K/MM3 (ref 0–0.8)
MYELOCYTES # (MANUAL): 0 K/MM3
PLATELET # BLD: 114 K/MM3 (ref 140–440)
PROMYELOCYTES # (MANUAL): 0 K/MM3
RBC # BLD AUTO: 3.22 M/MM3 (ref 3.65–5.03)
TOTAL CELLS COUNTED BLD: 100

## 2021-08-12 RX ADMIN — METOCLOPRAMIDE SCH MG: 5 INJECTION, SOLUTION INTRAMUSCULAR; INTRAVENOUS at 19:14

## 2021-08-12 RX ADMIN — POTASSIUM & SODIUM PHOSPHATES POWDER PACK 280-160-250 MG SCH EACH: 280-160-250 PACK at 06:30

## 2021-08-12 RX ADMIN — POTASSIUM & SODIUM PHOSPHATES POWDER PACK 280-160-250 MG SCH EACH: 280-160-250 PACK at 19:13

## 2021-08-12 RX ADMIN — SUCRALFATE ORAL SCH: 1 SUSPENSION ORAL at 15:00

## 2021-08-12 RX ADMIN — Medication SCH ML: at 09:29

## 2021-08-12 RX ADMIN — POTASSIUM CHLORIDE SCH MEQ: 1500 TABLET, EXTENDED RELEASE ORAL at 09:27

## 2021-08-12 RX ADMIN — Medication SCH ML: at 22:38

## 2021-08-12 RX ADMIN — SUCRALFATE ORAL SCH GM: 1 SUSPENSION ORAL at 01:04

## 2021-08-12 RX ADMIN — PANTOPRAZOLE SODIUM SCH MG: 40 INJECTION, POWDER, FOR SOLUTION INTRAVENOUS at 09:30

## 2021-08-12 RX ADMIN — METOPROLOL TARTRATE SCH MG: 50 TABLET, FILM COATED ORAL at 22:38

## 2021-08-12 RX ADMIN — FOLIC ACID SCH MG: 1 TABLET ORAL at 09:28

## 2021-08-12 RX ADMIN — AMIODARONE HYDROCHLORIDE SCH MG: 200 TABLET ORAL at 09:37

## 2021-08-12 RX ADMIN — METOCLOPRAMIDE SCH: 5 INJECTION, SOLUTION INTRAMUSCULAR; INTRAVENOUS at 15:00

## 2021-08-12 RX ADMIN — METOCLOPRAMIDE SCH MG: 5 INJECTION, SOLUTION INTRAMUSCULAR; INTRAVENOUS at 01:04

## 2021-08-12 RX ADMIN — POTASSIUM & SODIUM PHOSPHATES POWDER PACK 280-160-250 MG SCH EACH: 280-160-250 PACK at 22:35

## 2021-08-12 RX ADMIN — SCOPALAMINE SCH EACH: 1 PATCH, EXTENDED RELEASE TRANSDERMAL at 09:46

## 2021-08-12 RX ADMIN — PANTOPRAZOLE SODIUM SCH MG: 40 INJECTION, POWDER, FOR SOLUTION INTRAVENOUS at 22:35

## 2021-08-12 RX ADMIN — SUCRALFATE ORAL SCH GM: 1 SUSPENSION ORAL at 19:14

## 2021-08-12 RX ADMIN — METOCLOPRAMIDE SCH MG: 5 INJECTION, SOLUTION INTRAMUSCULAR; INTRAVENOUS at 06:35

## 2021-08-12 RX ADMIN — SUCRALFATE ORAL SCH GM: 1 SUSPENSION ORAL at 06:30

## 2021-08-12 RX ADMIN — METOPROLOL TARTRATE SCH MG: 50 TABLET, FILM COATED ORAL at 09:28

## 2021-08-12 NOTE — PROGRESS NOTE
Assessment and Plan





# Acute Kidney Injury: suspect pre-renal injury with dehydration, alcohol use.  

Similar presentation in past, with creatinine peaking to 4.9 before improving to

1.1 with IVF.  Creatinine 4.6->4.8->4.2->2.6->1.7 with supportive measures


-holding IVF for now given improvement and eating/hydrating po, encourage PO h

ydration as tolerated and allowed


- reviewed prior workup for imaging, serologies; ultrasound WNL


- strict Is/Os 


- replete lytes aggressively


- renally dose meds


- avoid nephrotoxins- consider alternative to PPI if able given AIN risk


- no immediate indication for renal replacement therapy or biopsy


- holding MRA for now given HERMELINDA, hypotension





# Hypokalemia: due to alcohol use, replete; continue with po KCl BID for now 

with K 3.3





# Hypomagnesemia: due to above, replete prn, check level in AM 





# Hypophosphatemia: risk for refeeding syndrome, continue Neutraphos TID for 

now, check daily phos





# Metabolic Alkalosis: due to vomiting, contraction, improving





# Hyponatremia: due to alcohol use, dehydration, improving with IVF





# Abdominal pain: reviewed CT which shows hiatal hernia, appreciate surgery 

input





# Alcohol abuse





# A-Fib/Tachycardia: on BB





# CHF: note prior echo, hold MRA for now, monitor volume status with IVF 





# HTN: BP remains low, hold amlodipine for now, remains on BB








Subjective


Date of service: 08/12/21


Interval history: 





Notes ongoing nausea.  Restless this AM





Objective





- Exam


Narrative Exam: 





General appearance: well-developed, well-nourished, no distress


EENT: ATNC


Neck: Present: neck supple


Respiratory: Clear to Ascultation


Heart: regular, S1S2


Gastrointestinal: Present: normoactive bowel sounds, tenderness


Integumentary: no rash, warm and dry


Neurologic: no focal deficit, alert and oriented x3


Psychiatric: mood/affect appropriate, restless





- Vital Signs


Vital signs: 


                               Vital Signs - 12hr











  08/12/21 08/12/21 08/12/21





  03:00 04:33 08:11


 


Temperature  98.0 F 97.3 F L


 


Pulse Rate 83 69 76


 


Respiratory  18 18





Rate   


 


Blood Pressure  107/65 117/81


 


O2 Sat by Pulse  98 99





Oximetry   














  08/12/21 08/12/21 08/12/21





  09:28 09:29 12:42


 


Temperature   98.1 F


 


Pulse Rate 100 H 100 H 100 H


 


Respiratory   18





Rate   


 


Blood Pressure   85/60


 


O2 Sat by Pulse   95





Oximetry   














- Lab





                                 08/12/21 05:02





                                 08/12/21 05:02


                             Most recent lab results











Calcium  8.7 mg/dL (8.4-10.2)   08/12/21  05:02    


 


Phosphorus  1.70 mg/dL (2.5-4.5)  L  08/11/21  03:43    


 


Magnesium  2.40 mg/dL (1.7-2.3)  H  08/11/21  03:43    














Medications & Allergies





- Medications


Allergies/Adverse Reactions: 


                                    Allergies





No Known Allergies Allergy (Verified 12/01/20 07:33)


   








Home Medications: 


                                Home Medications











 Medication  Instructions  Recorded  Confirmed  Last Taken  Type


 


Pantoprazole [Protonix TAB] 40 mg PO BID #60 tablet 11/07/19 04/03/21 Unknown Rx


 


DULoxetine [Cymbalta] 30 mg PO DAILY 09/12/20 04/03/21 Unknown History


 


Apixaban [Eliquis] 5 mg PO Q48HR 12/01/20 04/03/21 Unknown History


 


Gabapentin 300 mg PO DAILY 12/01/20 04/03/21 Unknown History


 


Ondansetron [Zofran ODT TAB] 4 mg PO Q8HR 12/01/20 04/03/21 Unknown History


 


Spironolactone [Aldactone] 25 mg PO QDAY 12/01/20 04/03/21 Unknown History


 


Acetaminophen [Acetaminophen TAB] 650 mg PO Q4H PRN  tablet 12/03/20 04/03/21 

Unknown Rx


 


Magnesium Oxide [Mag-Ox] 400 mg PO QDAY 7 Days #7 tablet 12/03/20 04/03/21 U

nknown Rx


 


Metoprolol Xl [Metoprolol 50 mg PO QDAY #30 tablet 12/03/20 04/03/21 Unknown Rx





SUCCINATE ER TAB]     


 


amLODIPine 10 mg PO QDAY #30 tablet 12/03/20 04/03/21 Unknown Rx


 


levETIRAcetam [Keppra] 750 mg PO BID #60 oral.liqd 12/03/20 04/03/21 Unknown Rx


 


oxyCODONE /ACETAMINOPHEN [Percocet 1 tab PO Q6H PRN  tablet 12/03/20 04/03/21 

Unknown Rx





5/325 mg]     


 


traMADoL [Ultram 50 MG tab] 50 mg PO Q6H PRN #30 tablet 12/03/20 04/03/21 

Unknown Rx


 


Amiodarone [Cordarone 200 MG TAB] 200 mg PO BID #60 tablet 04/06/21  Unknown Rx


 


Folic Acid [Folvite] 1 mg PO QDAY #30 tablet 04/06/21  Unknown Rx


 


Thiamine [Vitamin B-1] 100 mg PO QDAY #30 tablet 04/06/21  Unknown Rx











Active Medications: 














Generic Name Dose Route Start Last Admin





  Trade Name Freq  PRN Reason Stop Dose Admin


 


Acetaminophen  650 mg  08/09/21 00:17 





  Acetaminophen 325 Mg Tab  PO  





  Q4H PRN  





  Pain MILD(1-3)/Fever >100.5/HA  


 


Amiodarone HCl  200 mg  08/11/21 10:00  08/12/21 09:37





  Amiodarone 200 Mg Tab  PO   200 mg





  QDAY JORDANA   Administration


 


Amlodipine Besylate  10 mg  08/09/21 10:00  08/12/21 09:29





  Amlodipine 10 Mg Tab  PO   10 mg





  QDAY JORDANA   Administration


 


Apixaban  5 mg  08/09/21 10:00  08/11/21 09:57





  Apixaban 5 Mg Tab  PO   5 mg





  Q48HR JORDANA   Administration


 


Dextrose  50 ml  08/10/21 12:24 





  Dextrose 50% In Water (25gm) 50 Ml Syringe  IV  





  Q30MIN PRN  





  Hypoglycemia  





  Protocol  


 


Folic Acid  1 mg  08/11/21 10:00  08/12/21 09:28





  Folic Acid 1 Mg Tab  PO   1 mg





  QDAY JORDANA   Administration


 


Haloperidol Lactate  5 mg  08/09/21 00:17 





  Haloperidol Lactate 5 Mg/1 Ml Inj  IV  





  Q1H PRN  





  Unrespon. to mult. doses BZD's  


 


Hydralazine HCl  10 mg  08/10/21 10:05 





  Hydralazine 20 Mg/1 Ml Inj  IV  





  Q4HR PRN  





  Hypertension  


 


Levetiracetam  750 mg  08/10/21 22:00  08/12/21 08:55





  Levetiracetam 500 Mg Tab  PO   750 mg





  BID JORDANA   Administration


 


Lorazepam  2 mg  08/09/21 00:17  08/12/21 02:37





  Lorazepam 2 Mg/Ml Vial  IV   2 mg





  Q1H PRN   Administration





  NOA-Ar 8-15  


 


Metoclopramide HCl  5 mg  08/09/21 12:00  08/12/21 06:35





  Metoclopramide 10 Mg/2 Ml Inj  IV   5 mg





  Q6H JORDANA   Administration


 


Metoprolol Tartrate  50 mg  08/10/21 22:00  08/12/21 09:28





  Metoprolol Tartrate 50 Mg Tab  PO   50 mg





  BID JORDANA   Administration


 


Morphine Sulfate  2 mg  08/09/21 12:00 





  Morphine 2 Mg/1 Ml Inj  IV  





  Q4H PRN  





  Pain , Severe (7-10)  


 


Naloxone HCl  0.1 mg  08/09/21 00:17 





  Naloxone 0.4 Mg/1 Ml Inj  IV  





  Q2MIN PRN  





  Res Rate </= 8 or 02 SAT < 92%  


 


Ondansetron HCl  4 mg  08/09/21 00:17  08/11/21 10:09





  Ondansetron 4 Mg/2 Ml Inj  IV   4 mg





  Q8H PRN   Administration





  Nausea And Vomiting  


 


Pantoprazole Sodium  40 mg  08/09/21 10:00  08/12/21 09:30





  Pantoprazole 40 Mg Inj  IV   40 mg





  BID JORDANA   Administration


 


Potassium Chloride  20 meq  08/11/21 10:00  08/12/21 09:27





  Potassium Chloride Er 20 Meq Tab  PO   20 meq





  QDAY JORDANA   Administration


 


Potassium Phos/Sodium Phos  1 each  08/11/21 14:00  08/12/21 06:30





  Phos-Nak Powder Packet  PO   1 each





  Q8HR JORDANA   Administration


 


Scopolamine  1 each  08/09/21 13:00  08/12/21 09:46





  Scopolamine Transdermal Patch 72 Hr  TD   1 each





  Q3D JORDANA   Administration


 


Sodium Chloride  10 ml  08/09/21 10:00  08/12/21 09:29





  Sodium Chloride 0.9% 10 Ml Flush Syringe  IV   10 ml





  BID JORDANA   Administration


 


Sodium Chloride  10 ml  08/09/21 00:17  08/11/21 18:56





  Sodium Chloride 0.9% 10 Ml Flush Syringe  IV   10 ml





  PRN PRN   Administration





  LINE FLUSH  


 


Sucralfate  1 gm  08/09/21 18:00  08/12/21 06:30





  Sucralfate 1 Gm/10 Ml Oral Liqd  PO   1 gm





  Q6HR JORDANA   Administration

## 2021-08-12 NOTE — PROGRESS NOTE
Assessment and Plan





Paroxysmal Afib, associated with hypokalemia


   currently in sinus rhythm; on amiodarone and metoprolol for suppression


   on Eliquis for AC


Hx of Nonischemic cardiomyopathy


   4/2021 Echo: LVEF 35-40%


   [facility] 2017: normal coronaries, EF 25-30%.


Presence of Biotronik AICD


Acute renal failure


Hx of seizure disorder


Hypertension


Hx of Anemia


Chronic alcoholism





Recommendations:


Continue medical management for paroxysmal atrial fibrillation and nonischemic 

cardiomyopathy.


Low sodium diet and fluid restriction.





Subjective


Date of service: 08/12/21


Interval history: 





Patient is resting in bed and has no complaints.


Currently, he is stable sinus rhythm on telemetry.





Objective


                                   Vital Signs











  Temp Pulse Resp BP BP Pulse Ox


 


 08/12/21 09:29   100 H    


 


 08/12/21 09:28   100 H    


 


 08/12/21 08:11  97.3 F L  76  18  117/81   99


 


 08/12/21 04:33  98.0 F  69  18  107/65   98


 


 08/12/21 03:00   83    


 


 08/11/21 23:50  98.6 F  68  18   121/68  94


 


 08/11/21 22:30   77   141/114  


 


 08/11/21 22:00       97


 


 08/11/21 19:18  98.0 F  77  18  141/114   99


 


 08/11/21 19:00   88    


 


 08/11/21 16:19    18  90/60  


 


 08/11/21 16:00  98.5 F     


 


 08/11/21 11:00   87    


 


 08/11/21 10:00       97


 


 08/11/21 09:57   87   127/78  














- Physical Examination


General: No Apparent Distress


HEENT: Positive: PERRL


Neck: Positive: neck supple


Cardiac: Positive: Reg Rate and Rhythm


Lungs: Positive: Decreased Breath Sounds


Neuro: Positive: Weakness


Extremities: Absent: edema





- Labs and Meds


                                 Cardiac Enzymes











  08/12/21 Range/Units





  05:02 


 


AST  52 H  (5-40)  units/L








                                       CBC











  08/12/21 Range/Units





  05:02 


 


WBC  4.2 L  (4.5-11.0)  K/mm3


 


RBC  3.22 L  (3.65-5.03)  M/mm3


 


Hgb  9.4 L  (11.8-15.2)  gm/dl


 


Hct  28.2 L  (35.5-45.6)  %


 


Plt Count  114 L  (140-440)  K/mm3


 


Mono # (Auto)  1.0 H  (0.0-0.8)  K/mm3








                          Comprehensive Metabolic Panel











  08/12/21 Range/Units





  05:02 


 


Sodium  132 L  (137-145)  mmol/L


 


Potassium  3.3 L  (3.6-5.0)  mmol/L


 


Chloride  91.8 L  ()  mmol/L


 


Carbon Dioxide  27  (22-30)  mmol/L


 


BUN  25 H  (9-20)  mg/dL


 


Creatinine  1.7 H  (0.8-1.3)  mg/dL


 


Glucose  88  ()  mg/dL


 


Calcium  8.7  (8.4-10.2)  mg/dL


 


AST  52 H  (5-40)  units/L


 


ALT  24  (7-56)  units/L


 


Alkaline Phosphatase  90  ()  units/L


 


Total Protein  5.3 L  (6.3-8.2)  g/dL


 


Albumin  2.7 L  (3.9-5)  g/dL

## 2021-08-12 NOTE — PROGRESS NOTE
Assessment and Plan


Assessment and plan: 





This is 60-year-old male with HTN, DM, chronic EtOH abuse, p Olivarez's 

esophagitis, hiatal hernia, seizure disorder, A. fib, HFr EF, cardiomegaly s/p 

dual chamber AICD admitted with acute kidney injury, elevated LFT, hypokalemia, 

hypomagnesemia, A. fib with RVR





Neuro: h/o seizure disorder, h/o EtOH abuse, alcoholic withdrawal


-CIWA protocol, prn ativan


-Reorientation as needed


-Aspiration/fall/seizure precautions


-Thiamine, folic acid


-As needed Haldol


-8/11: Ordered restraints due to agitation and patient wandering today on floor.

Hallucination, fluctuating orientation on exam.





Cardio: A. fib RVR, HTN, A. fib, nonischemic cardiomegaly s/p dual-chamber AICD,

HFrEF


-Cardiology consulted, appreciate recommendations


-3/2017 cardiac cath demonstrated normal coronary arteries with a ventricular e

jection fraction of 20 to 25%


-5/2021 echocardiogram shows EF of 35 to 40%


-Blood pressure monitoring per protocol


-Amiodarone 200 mg daily, amlodipine, metoprolol


-Continue home Eliquis for anticoagulation





Respiratory: NAD


-Supplemental oxygen as needed


-Pulmonary hygiene


-SPO2 monitoring





GI: Acute on chronic abdominal pain, elevated LFTs, h/o GERD, esophagitis, 

hiatal hernia, long segment Olivarez's esophagus


-GI consulted, appreciate recommendations


-5/2020 MRCP-see GI notes for details


-6/24/2021 EGD at OSH-see GI notes for details


-PPI


-Carafate 3 times daily


-As needed Reglan


-As needed zofran


-Consistent carbohydrate cardiac renal diet





: Acute kidney injury, hypokalemia, hypomagnesemia, metabolic alkalosis, 

hyponatremia


-Nephrology consulted, appreciate recommendations


-MIVF per nephrology


-Renal ultrasound within normal limits-see results


-Repeat electrolytes aggressively


-Avoid nephrotoxic medications


-Renally dose medications


-History continue nephro


-Daily weights





Endo: NAD


-Patient has a history of " diabetes mellitus" per charting


-Hemoglobin A1c 4.8


-Patient was on Accu-Cheks every 6 while n.p.o.


-Avoid hypoglycemia





ID: NAD


-Monitor for signs or symptoms of infection


-Patient has been afebrile





Heme: NAD


-Trend CBC


-Transfuse to hemoglobin less than 7


-Patient is on Eliquis for A. fib


-PPI


-SCDs to bilateral lower extremities while in bed





8/11/2021: Patient very nauseous this morning on a.m. encounter.  Told nurse to 

give additional Zofran to patient.  He can be continued on Protonix and 

sucralfate as suggested by GI.  Discussed patient case with cardiology.  They 

recommended continued amiodarone 200 mg daily and metoprolol for atrial 

fibrillation.  In afternoon, was paged about patient agitation and 

disorientation.  Patient was seen walking in the hallways and stated that he 

wanted to go home.  RN redirected patient back to bed,  restraints were 

requested and ordered.  Patient is on CIWA protocol with as needed Ativan on 

board.  





8/12/2021: patient remains disoriented and appears tremulous. Restraints 

reordered as patient attempts to leave bed multiple times. Continuing with CIWA 

protocol. 





History


Interval history: 





patient remains disoriented and appears tremulous.





Hospitalist Physical





- Physical exam


Narrative exam: 





Physical Exam:


Constitutional: appears drowsy, cooperative.  Appeared ill.  Subsequent 

encounter patient was more agitated and disoriented


Head, Ears, Nose: Normocephalic, atraumatic. External ears, nose normal


Eyes: Conjunctivae/corneas clear. No icterus. No ptosis.


Neck: Supple, no meningeal signs


Oral: dentition fair, no thrush


Cardiovascular: S1, S2 normal. 


Respiratory: Good air entry, clear to auscultation bilaterally


GI: Soft,tender to palpation; bowel sounds normal. No peritoneal signs. 


Musculoskeletal: No pedal edema, no cyanosis.


Skin: No rash or abscess


Hem/Lymphatic: No palpable cervical or supraclavicular nodes. No lymphangitis


Psych: Mood ok. Affect normal


Neurological: Awake, alert, oriented x3 initially, oriented x1 on subsequent 

encounter. No gross abnormality





- Constitutional


Vitals: 


                                        











Temp Pulse Resp BP Pulse Ox


 


 97.3 F L  100 H  18   117/81   99 


 


 08/12/21 08:11  08/12/21 09:29  08/12/21 08:11  08/12/21 08:11  08/12/21 08:11











General appearance: Present: no acute distress





Results





- Labs


CBC & Chem 7: 


                                 08/12/21 05:02





                                 08/13/21 00:10


Labs: 


                             Laboratory Last Values











WBC  4.2 K/mm3 (4.5-11.0)  L  08/12/21  05:02    


 


RBC  3.22 M/mm3 (3.65-5.03)  L  08/12/21  05:02    


 


Hgb  9.4 gm/dl (11.8-15.2)  L  08/12/21  05:02    


 


Hct  28.2 % (35.5-45.6)  L  08/12/21  05:02    


 


MCV  88 fl (84-94)   08/12/21  05:02    


 


MCH  29 pg (28-32)   08/12/21  05:02    


 


MCHC  33 % (32-34)   08/12/21  05:02    


 


RDW  20.7 % (13.2-15.2)  H  08/12/21  05:02    


 


Plt Count  114 K/mm3 (140-440)  L  08/12/21  05:02    


 


Lymph % (Auto)  16.1 % (13.4-35.0)   08/10/21  04:03    


 


Mono % (Auto)  Np   08/12/21  05:02    


 


Eos % (Auto)  1.7 % (0.0-4.3)   08/12/21  05:02    


 


Baso % (Auto)  0.2 % (0.0-1.8)   08/10/21  04:03    


 


Lymph # (Auto)  0.8 K/mm3 (1.2-5.4)  L  08/10/21  04:03    


 


Mono # (Auto)  1.0 K/mm3 (0.0-0.8)  H  08/12/21  05:02    


 


Eos # (Auto)  0.0 K/mm3 (0.0-0.4)   08/10/21  04:03    


 


Baso # (Auto)  0.0 K/mm3 (0.0-0.1)   08/10/21  04:03    


 


Seg Neutrophils %  54.0 % (40.0-70.0)   08/12/21  05:02    


 


Seg Neutrophils #  3.3 K/mm3 (1.8-7.7)   08/10/21  04:03    


 


PT  13.9 Sec. (12.2-14.9)   08/08/21  21:02    


 


INR  1.02  (0.87-1.13)   08/08/21  21:02    


 


APTT  28.6 Sec. (24.2-36.6)   08/08/21  21:02    


 


Sodium  132 mmol/L (137-145)  L  08/12/21  05:02    


 


Potassium  3.3 mmol/L (3.6-5.0)  L  08/12/21  05:02    


 


Chloride  91.8 mmol/L ()  L  08/12/21  05:02    


 


Carbon Dioxide  27 mmol/L (22-30)   08/12/21  05:02    


 


Anion Gap  17 mmol/L  08/12/21  05:02    


 


BUN  25 mg/dL (9-20)  H  08/12/21  05:02    


 


Creatinine  1.7 mg/dL (0.8-1.3)  H  08/12/21  05:02    


 


Estimated GFR  50 ml/min  08/12/21  05:02    


 


BUN/Creatinine Ratio  15 %  08/12/21  05:02    


 


Glucose  88 mg/dL ()   08/12/21  05:02    


 


Hemoglobin A1c  4.8 % (4-6)   08/10/21  04:03    


 


Calcium  8.7 mg/dL (8.4-10.2)   08/12/21  05:02    


 


Phosphorus  1.70 mg/dL (2.5-4.5)  L  08/11/21  03:43    


 


Magnesium  2.40 mg/dL (1.7-2.3)  H  08/11/21  03:43    


 


Total Bilirubin  0.60 mg/dL (0.1-1.2)   08/12/21  05:02    


 


Direct Bilirubin  0.6 mg/dL (0-0.2)  H  08/10/21  04:03    


 


Indirect Bilirubin  0.8 mg/dL  08/10/21  04:03    


 


AST  52 units/L (5-40)  H  08/12/21  05:02    


 


ALT  24 units/L (7-56)   08/12/21  05:02    


 


Alkaline Phosphatase  90 units/L ()   08/12/21  05:02    


 


Total Protein  5.3 g/dL (6.3-8.2)  L  08/12/21  05:02    


 


Albumin  2.7 g/dL (3.9-5)  L  08/12/21  05:02    


 


Albumin/Globulin Ratio  1.0 %  08/12/21  05:02    


 


Lipase  20 units/L (13-60)   08/08/21  19:52    


 


Plasma/Serum Alcohol  0.18 % (0-0.07)  H  08/08/21  21:02    











[place]/IV: 


                                        





Voiding Method                   Condom Catheter











Active Medications





- Current Medications


Current Medications: 














Generic Name Dose Route Start Last Admin





  Trade Name Freq  PRN Reason Stop Dose Admin


 


Acetaminophen  650 mg  08/09/21 00:17 





  Acetaminophen 325 Mg Tab  PO  





  Q4H PRN  





  Pain MILD(1-3)/Fever >100.5/HA  


 


Amiodarone HCl  200 mg  08/11/21 10:00  08/12/21 09:37





  Amiodarone 200 Mg Tab  PO   200 mg





  QDAY JORDANA   Administration


 


Amlodipine Besylate  10 mg  08/09/21 10:00  08/12/21 09:29





  Amlodipine 10 Mg Tab  PO   10 mg





  QDAY JORDANA   Administration


 


Apixaban  5 mg  08/09/21 10:00  08/11/21 09:57





  Apixaban 5 Mg Tab  PO   5 mg





  Q48HR JORDANA   Administration


 


Dextrose  50 ml  08/10/21 12:24 





  Dextrose 50% In Water (25gm) 50 Ml Syringe  IV  





  Q30MIN PRN  





  Hypoglycemia  





  Protocol  


 


Folic Acid  1 mg  08/11/21 10:00  08/12/21 09:28





  Folic Acid 1 Mg Tab  PO   1 mg





  QDAY JORDANA   Administration


 


Haloperidol Lactate  5 mg  08/09/21 00:17 





  Haloperidol Lactate 5 Mg/1 Ml Inj  IV  





  Q1H PRN  





  Unrespon. to mult. doses BZD's  


 


Hydralazine HCl  10 mg  08/10/21 10:05 





  Hydralazine 20 Mg/1 Ml Inj  IV  





  Q4HR PRN  





  Hypertension  


 


Levetiracetam  750 mg  08/10/21 22:00  08/12/21 08:55





  Levetiracetam 500 Mg Tab  PO   750 mg





  BID JORDANA   Administration


 


Lorazepam  2 mg  08/09/21 00:17  08/12/21 02:37





  Lorazepam 2 Mg/Ml Vial  IV   2 mg





  Q1H PRN   Administration





  NOA-Ar 8-15  


 


Metoclopramide HCl  5 mg  08/09/21 12:00  08/12/21 06:35





  Metoclopramide 10 Mg/2 Ml Inj  IV   5 mg





  Q6H JORDANA   Administration


 


Metoprolol Tartrate  50 mg  08/10/21 22:00  08/12/21 09:28





  Metoprolol Tartrate 50 Mg Tab  PO   50 mg





  BID JORDANA   Administration


 


Morphine Sulfate  2 mg  08/09/21 12:00 





  Morphine 2 Mg/1 Ml Inj  IV  





  Q4H PRN  





  Pain , Severe (7-10)  


 


Naloxone HCl  0.1 mg  08/09/21 00:17 





  Naloxone 0.4 Mg/1 Ml Inj  IV  





  Q2MIN PRN  





  Res Rate </= 8 or 02 SAT < 92%  


 


Ondansetron HCl  4 mg  08/09/21 00:17  08/11/21 10:09





  Ondansetron 4 Mg/2 Ml Inj  IV   4 mg





  Q8H PRN   Administration





  Nausea And Vomiting  


 


Pantoprazole Sodium  40 mg  08/09/21 10:00  08/12/21 09:30





  Pantoprazole 40 Mg Inj  IV   40 mg





  BID JORDANA   Administration


 


Potassium Chloride  20 meq  08/11/21 10:00  08/12/21 09:27





  Potassium Chloride Er 20 Meq Tab  PO   20 meq





  QDAY JORDANA   Administration


 


Potassium Phos/Sodium Phos  1 each  08/11/21 14:00  08/12/21 06:30





  Phos-Nak Powder Packet  PO   1 each





  Q8HR JORDANA   Administration


 


Scopolamine  1 each  08/09/21 13:00  08/12/21 09:46





  Scopolamine Transdermal Patch 72 Hr  TD   1 each





  Q3D JORDANA   Administration


 


Sodium Chloride  10 ml  08/09/21 10:00  08/12/21 09:29





  Sodium Chloride 0.9% 10 Ml Flush Syringe  IV   10 ml





  BID JORDANA   Administration


 


Sodium Chloride  10 ml  08/09/21 00:17  08/11/21 18:56





  Sodium Chloride 0.9% 10 Ml Flush Syringe  IV   10 ml





  PRN PRN   Administration





  LINE FLUSH  


 


Sucralfate  1 gm  08/09/21 18:00  08/12/21 06:30





  Sucralfate 1 Gm/10 Ml Oral Liqd  PO   1 gm





  Q6HR JORDANA   Administration

## 2021-08-13 LAB
BUN SERPL-MCNC: 24 MG/DL (ref 9–20)
BUN/CREAT SERPL: 13 %
CALCIUM SERPL-MCNC: 8.3 MG/DL (ref 8.4–10.2)
HEMOLYSIS INDEX: 26

## 2021-08-13 RX ADMIN — METOCLOPRAMIDE SCH: 5 INJECTION, SOLUTION INTRAMUSCULAR; INTRAVENOUS at 18:13

## 2021-08-13 RX ADMIN — PANTOPRAZOLE SODIUM SCH: 40 TABLET, DELAYED RELEASE ORAL at 16:31

## 2021-08-13 RX ADMIN — SUCRALFATE ORAL SCH GM: 1 SUSPENSION ORAL at 08:00

## 2021-08-13 RX ADMIN — SUCRALFATE ORAL SCH: 1 SUSPENSION ORAL at 22:41

## 2021-08-13 RX ADMIN — SUCRALFATE ORAL SCH GM: 1 SUSPENSION ORAL at 00:48

## 2021-08-13 RX ADMIN — METOPROLOL TARTRATE SCH MG: 50 TABLET, FILM COATED ORAL at 22:41

## 2021-08-13 RX ADMIN — METOPROLOL TARTRATE SCH MG: 50 TABLET, FILM COATED ORAL at 09:32

## 2021-08-13 RX ADMIN — POTASSIUM & SODIUM PHOSPHATES POWDER PACK 280-160-250 MG SCH EACH: 280-160-250 PACK at 15:02

## 2021-08-13 RX ADMIN — HALOPERIDOL LACTATE PRN MG: 5 INJECTION, SOLUTION INTRAMUSCULAR at 09:33

## 2021-08-13 RX ADMIN — Medication SCH ML: at 22:41

## 2021-08-13 RX ADMIN — FOLIC ACID SCH MG: 1 TABLET ORAL at 09:33

## 2021-08-13 RX ADMIN — SUCRALFATE ORAL SCH: 1 SUSPENSION ORAL at 12:00

## 2021-08-13 RX ADMIN — PANTOPRAZOLE SODIUM SCH MG: 40 INJECTION, POWDER, FOR SOLUTION INTRAVENOUS at 09:33

## 2021-08-13 RX ADMIN — Medication SCH ML: at 09:33

## 2021-08-13 RX ADMIN — METOCLOPRAMIDE SCH MG: 5 INJECTION, SOLUTION INTRAMUSCULAR; INTRAVENOUS at 00:51

## 2021-08-13 RX ADMIN — POTASSIUM & SODIUM PHOSPHATES POWDER PACK 280-160-250 MG SCH EACH: 280-160-250 PACK at 22:41

## 2021-08-13 RX ADMIN — AMIODARONE HYDROCHLORIDE SCH MG: 200 TABLET ORAL at 09:33

## 2021-08-13 RX ADMIN — POTASSIUM CHLORIDE SCH MEQ: 1500 TABLET, EXTENDED RELEASE ORAL at 09:33

## 2021-08-13 RX ADMIN — METOCLOPRAMIDE SCH MG: 5 INJECTION, SOLUTION INTRAMUSCULAR; INTRAVENOUS at 08:00

## 2021-08-13 RX ADMIN — APIXABAN SCH MG: 5 TABLET, FILM COATED ORAL at 09:33

## 2021-08-13 RX ADMIN — THIAMINE HYDROCHLORIDE SCH MLS/HR: 100 INJECTION, SOLUTION INTRAMUSCULAR; INTRAVENOUS at 15:01

## 2021-08-13 RX ADMIN — POTASSIUM & SODIUM PHOSPHATES POWDER PACK 280-160-250 MG SCH EACH: 280-160-250 PACK at 08:01

## 2021-08-13 RX ADMIN — METOCLOPRAMIDE SCH MG: 5 INJECTION, SOLUTION INTRAMUSCULAR; INTRAVENOUS at 15:02

## 2021-08-13 NOTE — PROGRESS NOTE
Assessment and Plan





- Patient Problems


(1) Rapid atrial fibrillation


Current Visit: Yes   Status: Acute   


Plan to address problem: 


Patient has paroxysmal atrial fibrillation on optimal management.








(2) Nonischemic cardiomyopathy


Current Visit: Yes   Status: Acute   


Plan to address problem: 


Continue guideline directed medical therapy for chronic systolic left vent

ricular failure.








(3) Alcohol abuse


Current Visit: Yes   Status: Acute   


Plan to address problem: 


Alcohol abuse appears to be the patient's main clinical issue at this point, 

defer to internal medicine and  for further management.








Subjective


Date of service: 08/13/21


Interval history: 





Patient is comfortable, no new cardiac complaints, no acute distress.





Objective


                                   Vital Signs











  Temp Pulse Resp BP Pulse Ox


 


 08/13/21 08:54   101 H    98


 


 08/13/21 08:06  97.9 F  101 H  18  125/94  99


 


 08/13/21 05:01  98.6 F  101 H  18  112/76  99


 


 08/13/21 03:00   103 H   


 


 08/12/21 23:07  97.8 F  115 H  18  98/68  98


 


 08/12/21 22:38   121 H   101/66 


 


 08/12/21 22:00      96


 


 08/12/21 19:32  97.9 F  121 H  18  101/66  100


 


 08/12/21 19:00   106 H   


 


 08/12/21 16:34  97.3 F L  104 H  18  83/60  98


 


 08/12/21 12:42  98.1 F  100 H  18  85/60  95


 


 08/12/21 11:00   88   














- Physical Examination


General: No Apparent Distress


HEENT: Positive: PERRL


Neck: Positive: neck supple


Cardiac: Positive: Reg Rate and Rhythm


Lungs: Positive: Decreased Breath Sounds


Neuro: Positive: Weakness


Abdomen: Positive: Soft


Skin: Positive: Clear


Extremities: Absent: edema





- Labs and Meds


                          Comprehensive Metabolic Panel











  08/13/21 Range/Units





  00:10 


 


Sodium  130 L  (137-145)  mmol/L


 


Potassium  3.9  (3.6-5.0)  mmol/L


 


Chloride  96.2 L  ()  mmol/L


 


Carbon Dioxide  24  (22-30)  mmol/L


 


BUN  24 H  (9-20)  mg/dL


 


Creatinine  1.9 H  (0.8-1.3)  mg/dL


 


Glucose  133 H  ()  mg/dL


 


Calcium  8.3 L  (8.4-10.2)  mg/dL

## 2021-08-13 NOTE — PROGRESS NOTE
Subjective


Interval history: 


Patient was seen today for follow-up of multiple renal related issues


No complaints of any chest pain pressure or shortness of breath


Interdisciplinary notes that also reviewed


Events of 24 hours vitals labs intake output medications were reviewed








Past medical history: Reviewed


Family history: Reviewed


Social history: Reviewed


Allergies: Reviewed








Physical examination:


Vitals: Reviewed


HEENT: No pallor or icterus oral mucosa moist 


Neck: Supple no JVD no thyromegaly


Chest: Bilateral clear to auscultation anteriorly


Heart: Regular rate and rhythm S1-S2 heard no S3-S4


Abdomen: Soft nontender no voluntary guarding rigidity rebound


Extremity: Dry skin less than 1+ peripheral edema


Psychiatric: No evidence of agitation and aggression noted


Dermatology: No petechial rashes





Labs and x-rays: Reviewed from today





Assessment and plan





#Acute kidney injury patient admitted with dehydration and alcohol abuse and 

possible prerenal injury


Remarkable improvement in renal function with hydration and supportive care,


Creatinine is currently 1.9 was 1.7 yesterday


Continue to monitor renal function


History of recurrent renal failure which poses risk for progression of renal 

failure over time





#Hypokalemia needs monitoring close follow-up, also has hypomagnesemia in the 

setting of alcohol abuse





#Hyperphosphatemia, metabolic alkalosis, needs to follow-up closely, needs 

ongoing monitoring and follow overall improving





#Alcohol abuse, A. fib, tachycardia, congestive heart failure, patient must take

responsibility for his own health extensively counseled and educated





#Overall renal prognosis remains guarded and will depend on patient's compliance

he has been given full education about all his renal related issues





Patient was adequately counseled and educated regarding all the renal related 

issues


Laboratory studies, have been explained to the patient


All questions were answered and simple English


We'll continue to follow and make recommendation for renal standpoint








Objective





- Vital Signs


Vital signs: 


                               Vital Signs - 12hr











  08/12/21 08/12/21 08/13/21





  22:38 23:07 03:00


 


Temperature  97.8 F 


 


Pulse Rate 121 H 115 H 103 H


 


Respiratory  18 





Rate   


 


Blood Pressure 101/66 98/68 


 


O2 Sat by Pulse  98 





Oximetry   














  08/13/21 08/13/21 08/13/21





  05:01 08:06 08:54


 


Temperature 98.6 F 97.9 F 


 


Pulse Rate 101 H 101 H 101 H


 


Respiratory 18 18 





Rate   


 


Blood Pressure 112/76 125/94 


 


O2 Sat by Pulse 99 99 98





Oximetry   














- Lab





                                 08/12/21 05:02





                                 08/13/21 00:10


                             Most recent lab results











Calcium  8.3 mg/dL (8.4-10.2)  L  08/13/21  00:10    


 


Phosphorus  2.90 mg/dL (2.5-4.5)   08/13/21  00:10    


 


Magnesium  1.50 mg/dL (1.7-2.3)  L  08/13/21  00:10    














Medications & Allergies





- Medications


Allergies/Adverse Reactions: 


                                    Allergies





No Known Allergies Allergy (Verified 12/01/20 07:33)


   








Home Medications: 


                                Home Medications











 Medication  Instructions  Recorded  Confirmed  Last Taken  Type


 


Pantoprazole [Protonix TAB] 40 mg PO BID #60 tablet 11/07/19 04/03/21 Unknown Rx


 


DULoxetine [Cymbalta] 30 mg PO DAILY 09/12/20 04/03/21 Unknown History


 


Apixaban [Eliquis] 5 mg PO Q48HR 12/01/20 04/03/21 Unknown History


 


Gabapentin 300 mg PO DAILY 12/01/20 04/03/21 Unknown History


 


Ondansetron [Zofran ODT TAB] 4 mg PO Q8HR 12/01/20 04/03/21 Unknown History


 


Spironolactone [Aldactone] 25 mg PO QDAY 12/01/20 04/03/21 Unknown History


 


Acetaminophen [Acetaminophen TAB] 650 mg PO Q4H PRN  tablet 12/03/20 04/03/21 

Unknown Rx


 


Magnesium Oxide [Mag-Ox] 400 mg PO QDAY 7 Days #7 tablet 12/03/20 04/03/21 

Unknown Rx


 


Metoprolol Xl [Metoprolol 50 mg PO QDAY #30 tablet 12/03/20 04/03/21 Unknown Rx





SUCCINATE ER TAB]     


 


amLODIPine 10 mg PO QDAY #30 tablet 12/03/20 04/03/21 Unknown Rx


 


levETIRAcetam [Keppra] 750 mg PO BID #60 oral.liqd 12/03/20 04/03/21 Unknown Rx


 


oxyCODONE /ACETAMINOPHEN [Percocet 1 tab PO Q6H PRN  tablet 12/03/20 04/03/21 

Unknown Rx





5/325 mg]     


 


traMADoL [Ultram 50 MG tab] 50 mg PO Q6H PRN #30 tablet 12/03/20 04/03/21 

Unknown Rx


 


Amiodarone [Cordarone 200 MG TAB] 200 mg PO BID #60 tablet 04/06/21  Unknown Rx


 


Folic Acid [Folvite] 1 mg PO QDAY #30 tablet 04/06/21  Unknown Rx


 


Thiamine [Vitamin B-1] 100 mg PO QDAY #30 tablet 04/06/21  Unknown Rx











Active Medications: 














Generic Name Dose Route Start Last Admin





  Trade Name Freq  PRN Reason Stop Dose Admin


 


Acetaminophen  650 mg  08/09/21 00:17 





  Acetaminophen 325 Mg Tab  PO  





  Q4H PRN  





  Pain MILD(1-3)/Fever >100.5/HA  


 


Amiodarone HCl  200 mg  08/11/21 10:00  08/13/21 09:33





  Amiodarone 200 Mg Tab  PO   200 mg





  QDAY JORDANA   Administration


 


Apixaban  5 mg  08/09/21 10:00  08/13/21 09:33





  Apixaban 5 Mg Tab  PO   5 mg





  Q48HR JORDANA   Administration


 


Dextrose  50 ml  08/10/21 12:24 





  Dextrose 50% In Water (25gm) 50 Ml Syringe  IV  





  Q30MIN PRN  





  Hypoglycemia  





  Protocol  


 


Folic Acid  1 mg  08/11/21 10:00  08/13/21 09:33





  Folic Acid 1 Mg Tab  PO   1 mg





  QDAY JORDANA   Administration


 


Haloperidol Lactate  5 mg  08/09/21 00:17  08/13/21 09:33





  Haloperidol Lactate 5 Mg/1 Ml Inj  IV   5 mg





  Q1H PRN   Administration





  Unrespon. to mult. doses BZD's  


 


Hydralazine HCl  10 mg  08/10/21 10:05 





  Hydralazine 20 Mg/1 Ml Inj  IV  





  Q4HR PRN  





  Hypertension  


 


Dextrose  1,000 mls @ 100 mls/hr  08/13/21 10:00 





  D5w  IV  08/13/21 19:59 





  AS DIRECT JORDANA  


 


Thiamine HCl 100 mg/ Sodium  51 mls @ 100 mls/hr  08/13/21 10:00 





  Chloride  IV  08/15/21 10:31 





  QDAY JORDANA  


 


Levetiracetam  750 mg  08/10/21 22:00  08/13/21 09:33





  Levetiracetam 500 Mg Tab  PO   750 mg





  BID JORDANA   Administration


 


Lorazepam  2 mg  08/09/21 00:17  08/13/21 04:46





  Lorazepam 2 Mg/Ml Vial  IV   2 mg





  Q1H PRN   Administration





  [place] 8-15  


 


Metoclopramide HCl  5 mg  08/09/21 12:00  08/13/21 08:00





  Metoclopramide 10 Mg/2 Ml Inj  IV   5 mg





  Q6H JORDANA   Administration


 


Metoprolol Tartrate  50 mg  08/10/21 22:00  08/13/21 09:32





  Metoprolol Tartrate 50 Mg Tab  PO   50 mg





  BID JORDANA   Administration


 


Morphine Sulfate  2 mg  08/09/21 12:00 





  Morphine 2 Mg/1 Ml Inj  IV  





  Q4H PRN  





  Pain , Severe (7-10)  


 


Naloxone HCl  0.1 mg  08/09/21 00:17 





  Naloxone 0.4 Mg/1 Ml Inj  IV  





  Q2MIN PRN  





  Res Rate </= 8 or 02 SAT < 92%  


 


Ondansetron HCl  4 mg  08/09/21 00:17  08/11/21 10:09





  Ondansetron 4 Mg/2 Ml Inj  IV   4 mg





  Q8H PRN   Administration





  Nausea And Vomiting  


 


Pantoprazole Sodium  40 mg  08/09/21 10:00  08/13/21 09:33





  Pantoprazole 40 Mg Inj  IV   40 mg





  BID JORDANA   Administration


 


Potassium Chloride  40 meq  08/13/21 10:00  08/13/21 09:33





  Potassium Chloride Er 20 Meq Tab  PO   40 meq





  QDAY JORDANA   Administration


 


Potassium Phos/Sodium Phos  1 each  08/11/21 14:00  08/13/21 08:01





  Phos-Nak Powder Packet  PO   1 each





  Q8HR JORDANA   Administration


 


Scopolamine  1 each  08/09/21 13:00  08/12/21 09:46





  Scopolamine Transdermal Patch 72 Hr  TD   1 each





  Q3D JORDANA   Administration


 


Sodium Chloride  10 ml  08/09/21 10:00  08/13/21 09:33





  Sodium Chloride 0.9% 10 Ml Flush Syringe  IV   10 ml





  BID JORDANA   Administration


 


Sodium Chloride  10 ml  08/09/21 00:17  08/11/21 18:56





  Sodium Chloride 0.9% 10 Ml Flush Syringe  IV   10 ml





  PRN PRN   Administration





  LINE FLUSH  


 


Sucralfate  1 gm  08/09/21 18:00  08/13/21 08:00





  Sucralfate 1 Gm/10 Ml Oral Liqd  PO   1 gm





  Q6HR JORDANA   Administration

## 2021-08-13 NOTE — PROGRESS NOTE
Assessment and Plan


Assessment and plan: 





This is 60-year-old male with HTN, DM, chronic EtOH abuse, p Olivarez's 

esophagitis, hiatal hernia, seizure disorder, A. fib, HFr EF, cardiomegaly s/p 

dual chamber AICD admitted with acute kidney injury, elevated LFT, hypokalemia, 

hypomagnesemia, A. fib with RVR





Neuro: h/o seizure disorder, h/o EtOH abuse, alcoholic withdrawal


-CIWA protocol, prn ativan


-Reorientation as needed


-Aspiration/fall/seizure precautions


-Thiamine, folic acid


-As needed Haldol


-8/11: Ordered restraints due to agitation and patient wandering today on floor.

Hallucination, fluctuating orientation on exam.





Cardio: A. fib RVR, HTN, A. fib, nonischemic cardiomegaly s/p dual-chamber AICD,

HFrEF


-Cardiology consulted, appreciate recommendations


-3/2017 cardiac cath demonstrated normal coronary arteries with a ventricular e

jection fraction of 20 to 25%


-5/2021 echocardiogram shows EF of 35 to 40%


-Blood pressure monitoring per protocol


-Amiodarone 200 mg daily, amlodipine, metoprolol


-Continue home Eliquis for anticoagulation





Respiratory: NAD


-Supplemental oxygen as needed


-Pulmonary hygiene


-SPO2 monitoring





GI: Acute on chronic abdominal pain, elevated LFTs, h/o GERD, esophagitis, 

hiatal hernia, long segment Olivarez's esophagus


-GI consulted, appreciate recommendations


-5/2020 MRCP-see GI notes for details


-6/24/2021 EGD at OSH-see GI notes for details


-PPI


-Carafate 3 times daily


-As needed Reglan


-As needed zofran


-Consistent carbohydrate cardiac renal diet





: Acute kidney injury, hypokalemia, hypomagnesemia, metabolic alkalosis, 

hyponatremia


-Nephrology consulted, appreciate recommendations


-MIVF per nephrology


-Renal ultrasound within normal limits-see results


-Repeat electrolytes aggressively


-Avoid nephrotoxic medications


-Renally dose medications


-History continue nephro


-Daily weights





Endo: NAD


-Patient has a history of " diabetes mellitus" per charting


-Hemoglobin A1c 4.8


-Patient was on Accu-Cheks every 6 while n.p.o.


-Avoid hypoglycemia





ID: NAD


-Monitor for signs or symptoms of infection


-Patient has been afebrile





Heme: NAD


-Trend CBC


-Transfuse to hemoglobin less than 7


-Patient is on Eliquis for A. fib


-PPI


-SCDs to bilateral lower extremities while in bed





8/11/2021: Patient very nauseous this morning on a.m. encounter.  Told nurse to 

give additional Zofran to patient.  He can be continued on Protonix and 

sucralfate as suggested by GI.  Discussed patient case with cardiology.  They 

recommended continued amiodarone 200 mg daily and metoprolol for atrial 

fibrillation.  In afternoon, was paged about patient agitation and 

disorientation.  Patient was seen walking in the hallways and stated that he 

wanted to go home.  RN redirected patient back to bed,  restraints were 

requested and ordered.  Patient is on CIWA protocol with as needed Ativan on 

board.  





8/12/2021: patient remains disoriented and appears tremulous. Restraints 

reordered as patient attempts to leave bed multiple times. Continuing with CIWA 

protocol. 





8/13/2021: Patient remains disoriented.  Required doses of Ativan due to 

agitation.  Will need restraints.  Will reassess through the weekend as patient 

continues to have withdrawal





History


Interval history: 





patient resting comfortably in bed. Required ativan due to aggitation.





Hospitalist Physical





- Physical exam


Narrative exam: 





Physical Exam:


Constitutional: sedated ,sleeping encounter.


Head, Ears, Nose: Normocephalic, atraumatic. External ears, nose normal


Eyes: Conjunctivae/corneas clear. No icterus. No ptosis.


Neck: Supple, no meningeal signs


Oral: dentition fair, no thrush


Cardiovascular: S1, S2 normal. 


Respiratory: Good air entry, clear to auscultation bilaterally


GI: Soft,tender to palpation; bowel sounds normal. No peritoneal signs. 


Musculoskeletal: No pedal edema, no cyanosis.


Skin: No rash or abscess


Hem/Lymphatic: No palpable cervical or supraclavicular nodes. No lymphangitis


Psych: unable to assess


Neurological: Unable to assess. 





- Constitutional


Vitals: 


                                        











Temp Pulse Resp BP Pulse Ox


 


 97.4 F L  98 H  18   116/76   98 


 


 08/13/21 12:01  08/13/21 16:00  08/13/21 12:01  08/13/21 12:01  08/13/21 12:01











General appearance: Present: no acute distress





Results





- Labs


CBC & Chem 7: 


                                 08/12/21 05:02





                                 08/13/21 00:10


Labs: 


                             Laboratory Last Values











WBC  4.2 K/mm3 (4.5-11.0)  L  08/12/21  05:02    


 


RBC  3.22 M/mm3 (3.65-5.03)  L  08/12/21  05:02    


 


Hgb  9.4 gm/dl (11.8-15.2)  L  08/12/21  05:02    


 


Hct  28.2 % (35.5-45.6)  L  08/12/21  05:02    


 


MCV  88 fl (84-94)   08/12/21  05:02    


 


MCH  29 pg (28-32)   08/12/21  05:02    


 


MCHC  33 % (32-34)   08/12/21  05:02    


 


RDW  20.7 % (13.2-15.2)  H  08/12/21  05:02    


 


Plt Count  114 K/mm3 (140-440)  L  08/12/21  05:02    


 


Lymph % (Auto)  16.1 % (13.4-35.0)   08/10/21  04:03    


 


Mono % (Auto)  Np   08/12/21  05:02    


 


Eos % (Auto)  1.7 % (0.0-4.3)   08/12/21  05:02    


 


Baso % (Auto)  0.2 % (0.0-1.8)   08/10/21  04:03    


 


Lymph # (Auto)  0.8 K/mm3 (1.2-5.4)  L  08/10/21  04:03    


 


Mono # (Auto)  1.0 K/mm3 (0.0-0.8)  H  08/12/21  05:02    


 


Eos # (Auto)  0.0 K/mm3 (0.0-0.4)   08/10/21  04:03    


 


Baso # (Auto)  0.0 K/mm3 (0.0-0.1)   08/10/21  04:03    


 


Add Manual Diff  Complete   08/12/21  05:02    


 


Total Counted  100   08/12/21  05:02    


 


Seg Neutrophils %  54.0 % (40.0-70.0)   08/12/21  05:02    


 


Seg Neuts % (Manual)  56.0 % (40.0-70.0)   08/12/21  05:02    


 


Lymphocytes % (Manual)  22.0 % (13.4-35.0)   08/12/21  05:02    


 


Monocytes % (Manual)  19.0 % (0.0-7.3)  H  08/12/21  05:02    


 


Eosinophils % (Manual)  2.0 % (0.0-4.3)   08/12/21  05:02    


 


Basophils % (Manual)  1.0 % (0.0-1.8)   08/12/21  05:02    


 


Nucleated RBC %  Not Reportable   08/12/21  05:02    


 


Seg Neutrophils #  3.3 K/mm3 (1.8-7.7)   08/10/21  04:03    


 


Seg Neutrophils # Man  2.4 K/mm3 (1.8-7.7)   08/12/21  05:02    


 


Band Neutrophils #  0.0 K/mm3  08/12/21  05:02    


 


Lymphocytes # (Manual)  0.9 K/mm3 (1.2-5.4)  L  08/12/21  05:02    


 


Abs React Lymphs (Man)  0.0 K/mm3  08/12/21  05:02    


 


Monocytes # (Manual)  0.8 K/mm3 (0.0-0.8)   08/12/21  05:02    


 


Eosinophils # (Manual)  0.1 K/mm3 (0.0-0.4)   08/12/21  05:02    


 


Basophils # (Manual)  0.0 K/mm3 (0.0-0.1)   08/12/21  05:02    


 


Metamyelocytes #  0.0 K/mm3  08/12/21  05:02    


 


Myelocytes #  0.0 K/mm3  08/12/21  05:02    


 


Promyelocytes #  0.0 K/mm3  08/12/21  05:02    


 


Blast Cells #  0.0 K/mm3  08/12/21  05:02    


 


WBC Morphology  Not Reportable   08/12/21  05:02    


 


Hypersegmented Neuts  Not Reportable   08/12/21  05:02    


 


Hyposegmented Neuts  Not Reportable   08/12/21  05:02    


 


Hypogranular Neuts  Not Reportable   08/12/21  05:02    


 


Smudge Cells  Not Reportable   08/12/21  05:02    


 


Toxic Granulation  Not Reportable   08/12/21  05:02    


 


Toxic Vacuolation  Not Reportable   08/12/21  05:02    


 


Dohle Bodies  Not Reportable   08/12/21  05:02    


 


Pelger-Huet Anomaly  Not Reportable   08/12/21  05:02    


 


Peyton Rods  Not Reportable   08/12/21  05:02    


 


Platelet Estimate  Not Reportable   08/12/21  05:02    


 


Clumped Platelets  Not Reportable   08/12/21  05:02    


 


Plt Clumps, EDTA  Not Reportable   08/12/21  05:02    


 


Large Platelets  Not Reportable   08/12/21  05:02    


 


Giant Platelets  Not Reportable   08/12/21  05:02    


 


Platelet Satelliting  Not Reportable   08/12/21  05:02    


 


Plt Morphology Comment  Not Reportable   08/12/21  05:02    


 


RBC Morphology  Not Reportable   08/12/21  05:02    


 


Dimorphic RBCs  Not Reportable   08/12/21  05:02    


 


Polychromasia  Not Reportable   08/12/21  05:02    


 


Hypochromasia  Not Reportable   08/12/21  05:02    


 


Poikilocytosis  Not Reportable   08/12/21  05:02    


 


Anisocytosis  1+   08/12/21  05:02    


 


Microcytosis  Few   08/12/21  05:02    


 


Macrocytosis  Not Reportable   08/12/21  05:02    


 


Spherocytes  Not Reportable   08/12/21  05:02    


 


Pappenheimer Bodies  Not Reportable   08/12/21  05:02    


 


Sickle Cells  Not Reportable   08/12/21  05:02    


 


Target Cells  Not Reportable   08/12/21  05:02    


 


Tear Drop Cells  Not Reportable   08/12/21  05:02    


 


Ovalocytes  Not Reportable   08/12/21  05:02    


 


Helmet Cells  Not Reportable   08/12/21  05:02    


 


Aguirre-Raven Bodies  Not Reportable   08/12/21  05:02    


 


Cabot Rings  Not Reportable   08/12/21  05:02    


 


[place]land Cells  Not Reportable   08/12/21  05:02    


 


Bite Cells  Not Reportable   08/12/21  05:02    


 


Crenated Cell  Not Reportable   08/12/21  05:02    


 


Elliptocytes  Not Reportable   08/12/21  05:02    


 


Acanthocytes (Spur)  Not Reportable   08/12/21  05:02    


 


Rouleaux  Not Reportable   08/12/21  05:02    


 


Hemoglobin C Crystals  Not Reportable   08/12/21  05:02    


 


Schistocytes  Rare   08/12/21  05:02    


 


Malaria parasites  Not Reportable   08/12/21  05:02    


 


George Bodies  Not Reportable   08/12/21  05:02    


 


Hem Pathologist Commnt  No   08/12/21  05:02    


 


PT  13.9 Sec. (12.2-14.9)   08/08/21  21:02    


 


INR  1.02  (0.87-1.13)   08/08/21  21:02    


 


APTT  28.6 Sec. (24.2-36.6)   08/08/21  21:02    


 


Sodium  130 mmol/L (137-145)  L  08/13/21  00:10    


 


Potassium  3.9 mmol/L (3.6-5.0)   08/13/21  00:10    


 


Chloride  96.2 mmol/L ()  L  08/13/21  00:10    


 


Carbon Dioxide  24 mmol/L (22-30)   08/13/21  00:10    


 


Anion Gap  14 mmol/L  08/13/21  00:10    


 


BUN  24 mg/dL (9-20)  H  08/13/21  00:10    


 


Creatinine  1.9 mg/dL (0.8-1.3)  H  08/13/21  00:10    


 


Estimated GFR  44 ml/min  08/13/21  00:10    


 


BUN/Creatinine Ratio  13 %  08/13/21  00:10    


 


Glucose  133 mg/dL ()  H  08/13/21  00:10    


 


Hemoglobin A1c  4.8 % (4-6)   08/10/21  04:03    


 


Calcium  8.3 mg/dL (8.4-10.2)  L  08/13/21  00:10    


 


Phosphorus  2.90 mg/dL (2.5-4.5)   08/13/21  00:10    


 


Magnesium  1.50 mg/dL (1.7-2.3)  L  08/13/21  00:10    


 


Total Bilirubin  0.60 mg/dL (0.1-1.2)   08/12/21  05:02    


 


Direct Bilirubin  0.6 mg/dL (0-0.2)  H  08/10/21  04:03    


 


Indirect Bilirubin  0.8 mg/dL  08/10/21  04:03    


 


AST  52 units/L (5-40)  H  08/12/21  05:02    


 


ALT  24 units/L (7-56)   08/12/21  05:02    


 


Alkaline Phosphatase  90 units/L ()   08/12/21  05:02    


 


Total Protein  5.3 g/dL (6.3-8.2)  L  08/12/21  05:02    


 


Albumin  2.7 g/dL (3.9-5)  L  08/12/21  05:02    


 


Albumin/Globulin Ratio  1.0 %  08/12/21  05:02    


 


Lipase  20 units/L (13-60)   08/08/21  19:52    


 


Nasal Screen MRSA (PCR)  Negative  (Negative)   08/11/21  15:46    


 


Plasma/Serum Alcohol  0.18 % (0-0.07)  H  08/08/21  21:02    











[place]/IV: 


                                        





Voiding Method                   Condom Catheter











Active Medications





- Current Medications


Current Medications: 














Generic Name Dose Route Start Last Admin





  Trade Name Freq  PRN Reason Stop Dose Admin


 


Acetaminophen  650 mg  08/09/21 00:17 





  Acetaminophen 325 Mg Tab  PO  





  Q4H PRN  





  Pain MILD(1-3)/Fever >100.5/HA  


 


Amiodarone HCl  200 mg  08/11/21 10:00  08/13/21 09:33





  Amiodarone 200 Mg Tab  PO   200 mg





  QDAY JORDANA   Administration


 


Apixaban  5 mg  08/09/21 10:00  08/13/21 09:33





  Apixaban 5 Mg Tab  PO   5 mg





  Q48HR JORDANA   Administration


 


Dextrose  50 ml  08/10/21 12:24 





  Dextrose 50% In Water (25gm) 50 Ml Syringe  IV  





  Q30MIN PRN  





  Hypoglycemia  





  Protocol  


 


Folic Acid  1 mg  08/11/21 10:00  08/13/21 09:33





  Folic Acid 1 Mg Tab  PO   1 mg





  QDAY JORDANA   Administration


 


Haloperidol Lactate  5 mg  08/09/21 00:17  08/13/21 09:33





  Haloperidol Lactate 5 Mg/1 Ml Inj  IV   5 mg





  Q1H PRN   Administration





  Unrespon. to mult. doses BZD's  


 


Hydralazine HCl  10 mg  08/10/21 10:05 





  Hydralazine 20 Mg/1 Ml Inj  IV  





  Q4HR PRN  





  Hypertension  


 


Dextrose  1,000 mls @ 100 mls/hr  08/13/21 10:00 





  D5w  IV  08/13/21 19:59 





  AS DIRECT JORDANA  


 


Thiamine HCl 100 mg/ Sodium  51 mls @ 100 mls/hr  08/13/21 10:00  08/13/21 15:01





  Chloride  IV  08/15/21 10:31  100 mls/hr





  QDAY JORDANA   Administration


 


Levetiracetam  750 mg  08/10/21 22:00  08/13/21 09:33





  Levetiracetam 500 Mg Tab  PO   750 mg





  BID JORDANA   Administration


 


Lorazepam  2 mg  08/09/21 00:17  08/13/21 04:46





  Lorazepam 2 Mg/Ml Vial  IV   2 mg





  Q1H PRN   Administration





  CIWA-Ar 8-15  


 


Metoclopramide HCl  5 mg  08/09/21 12:00  08/13/21 15:02





  Metoclopramide 10 Mg/2 Ml Inj  IV   5 mg





  Q6H JORDANA   Administration


 


Metoprolol Tartrate  50 mg  08/10/21 22:00  08/13/21 09:32





  Metoprolol Tartrate 50 Mg Tab  PO   50 mg





  BID JORDANA   Administration


 


Morphine Sulfate  2 mg  08/09/21 12:00 





  Morphine 2 Mg/1 Ml Inj  IV  





  Q4H PRN  





  Pain , Severe (7-10)  


 


Naloxone HCl  0.1 mg  08/09/21 00:17 





  Naloxone 0.4 Mg/1 Ml Inj  IV  





  Q2MIN PRN  





  Res Rate </= 8 or 02 SAT < 92%  


 


Ondansetron HCl  4 mg  08/09/21 00:17  08/11/21 10:09





  Ondansetron 4 Mg/2 Ml Inj  IV   4 mg





  Q8H PRN   Administration





  Nausea And Vomiting  


 


Pantoprazole Sodium  40 mg  08/13/21 16:30  08/13/21 16:31





  Pantoprazole 40 Mg Tab  PO   Not Given





  BIDAC JORDANA  


 


Potassium Chloride  40 meq  08/13/21 10:00  08/13/21 09:33





  Potassium Chloride Er 20 Meq Tab  PO   40 meq





  QDAY JORDANA   Administration


 


Potassium Phos/Sodium Phos  1 each  08/11/21 14:00  08/13/21 15:02





  Phos-Nak Powder Packet  PO   1 each





  Q8HR JORDANA   Administration


 


Scopolamine  1 each  08/09/21 13:00  08/12/21 09:46





  Scopolamine Transdermal Patch 72 Hr  TD   1 each





  Q3D JORDANA   Administration


 


Sodium Chloride  10 ml  08/09/21 10:00  08/13/21 09:33





  Sodium Chloride 0.9% 10 Ml Flush Syringe  IV   10 ml





  BID JORDANA   Administration


 


Sodium Chloride  10 ml  08/09/21 00:17  08/11/21 18:56





  Sodium Chloride 0.9% 10 Ml Flush Syringe  IV   10 ml





  PRN PRN   Administration





  LINE FLUSH  


 


Sucralfate  1 gm  08/09/21 18:00  08/13/21 12:00





  Sucralfate 1 Gm/10 Ml Oral Liqd  PO   Not Given





  Q6HR JORDANA

## 2021-08-14 LAB
ALBUMIN SERPL-MCNC: 2.7 G/DL (ref 3.9–5)
ALT SERPL-CCNC: 20 UNITS/L (ref 7–56)
BAND NEUTROPHILS # (MANUAL): 0 K/MM3
BUN SERPL-MCNC: 18 MG/DL (ref 9–20)
BUN/CREAT SERPL: 14 %
CALCIUM SERPL-MCNC: 8.9 MG/DL (ref 8.4–10.2)
HCT VFR BLD CALC: 29 % (ref 35.5–45.6)
HEMOLYSIS INDEX: 23
HGB BLD-MCNC: 9.6 GM/DL (ref 11.8–15.2)
MCHC RBC AUTO-ENTMCNC: 33 % (ref 32–34)
MCV RBC AUTO: 88 FL (ref 84–94)
MYELOCYTES # (MANUAL): 0 K/MM3
PLATELET # BLD: 180 K/MM3 (ref 140–440)
PROMYELOCYTES # (MANUAL): 0 K/MM3
RBC # BLD AUTO: 3.29 M/MM3 (ref 3.65–5.03)
TOTAL CELLS COUNTED BLD: 100

## 2021-08-14 RX ADMIN — THIAMINE HYDROCHLORIDE SCH MLS/HR: 100 INJECTION, SOLUTION INTRAMUSCULAR; INTRAVENOUS at 23:20

## 2021-08-14 RX ADMIN — FOLIC ACID SCH MG: 1 TABLET ORAL at 10:55

## 2021-08-14 RX ADMIN — METOCLOPRAMIDE SCH MG: 5 INJECTION, SOLUTION INTRAMUSCULAR; INTRAVENOUS at 18:40

## 2021-08-14 RX ADMIN — AMIODARONE HYDROCHLORIDE SCH MG: 200 TABLET ORAL at 10:55

## 2021-08-14 RX ADMIN — Medication SCH ML: at 23:13

## 2021-08-14 RX ADMIN — HALOPERIDOL LACTATE PRN MG: 5 INJECTION, SOLUTION INTRAMUSCULAR at 23:28

## 2021-08-14 RX ADMIN — SUCRALFATE ORAL SCH GM: 1 SUSPENSION ORAL at 18:39

## 2021-08-14 RX ADMIN — METOCLOPRAMIDE SCH MG: 5 INJECTION, SOLUTION INTRAMUSCULAR; INTRAVENOUS at 00:13

## 2021-08-14 RX ADMIN — PANTOPRAZOLE SODIUM SCH MG: 40 TABLET, DELAYED RELEASE ORAL at 17:16

## 2021-08-14 RX ADMIN — PANTOPRAZOLE SODIUM SCH MG: 40 TABLET, DELAYED RELEASE ORAL at 10:54

## 2021-08-14 RX ADMIN — POTASSIUM CHLORIDE SCH MEQ: 1500 TABLET, EXTENDED RELEASE ORAL at 10:54

## 2021-08-14 RX ADMIN — METOCLOPRAMIDE SCH MG: 5 INJECTION, SOLUTION INTRAMUSCULAR; INTRAVENOUS at 13:02

## 2021-08-14 RX ADMIN — METOCLOPRAMIDE SCH MG: 5 INJECTION, SOLUTION INTRAMUSCULAR; INTRAVENOUS at 06:17

## 2021-08-14 RX ADMIN — METOPROLOL TARTRATE SCH MG: 50 TABLET, FILM COATED ORAL at 10:53

## 2021-08-14 RX ADMIN — Medication SCH ML: at 10:55

## 2021-08-14 RX ADMIN — SUCRALFATE ORAL SCH GM: 1 SUSPENSION ORAL at 13:01

## 2021-08-14 RX ADMIN — SUCRALFATE ORAL SCH GM: 1 SUSPENSION ORAL at 06:15

## 2021-08-14 RX ADMIN — POTASSIUM & SODIUM PHOSPHATES POWDER PACK 280-160-250 MG SCH EACH: 280-160-250 PACK at 23:13

## 2021-08-14 RX ADMIN — THIAMINE HYDROCHLORIDE SCH: 100 INJECTION, SOLUTION INTRAMUSCULAR; INTRAVENOUS at 15:27

## 2021-08-14 RX ADMIN — POTASSIUM & SODIUM PHOSPHATES POWDER PACK 280-160-250 MG SCH EACH: 280-160-250 PACK at 06:15

## 2021-08-14 RX ADMIN — SUCRALFATE ORAL SCH GM: 1 SUSPENSION ORAL at 23:12

## 2021-08-14 RX ADMIN — POTASSIUM & SODIUM PHOSPHATES POWDER PACK 280-160-250 MG SCH EACH: 280-160-250 PACK at 15:03

## 2021-08-14 RX ADMIN — SUCRALFATE ORAL SCH GM: 1 SUSPENSION ORAL at 00:13

## 2021-08-14 RX ADMIN — METOPROLOL TARTRATE SCH MG: 50 TABLET, FILM COATED ORAL at 23:11

## 2021-08-14 NOTE — PROGRESS NOTE
Assessment and Plan





1.  Chronic combined systolic and diastolic heart failure


2.  Dilated nonischemic cardiomyopathy left ventricular ejection fraction 35 to 

40%


3.  Permanent atrial fibrillation


4.  Essential hypertension


5.  Type 2 diabetes mellitus


6.  Alcohol abuse/alcohol withdrawal syndrome


7.  Gastroesophageal reflux disease with Olivarez's esophagitis





Plan.


Cardiac wise stable will continue present conservative cardiac management with 

rate control of atrial fibrillation.  And afterload reduction of the left 

ventricle.





Subjective


Date of service: 08/14/21


Interval history: 





Altered mental status. 





Objective


                                   Vital Signs











  Temp Pulse Resp BP Pulse Ox


 


 08/14/21 08:33  98.2 F  102 H  18  116/84  100


 


 08/14/21 06:18  100.8 F H  98 H  18  135/91  97


 


 08/13/21 22:38   109 H    98


 


 08/13/21 20:00    18   99


 


 08/13/21 16:14  97.8 F  90  18  102/73  99


 


 08/13/21 16:00   98 H   


 


 08/13/21 12:01  97.4 F L  102 H  18  116/76  98














- Physical Examination


General: No Apparent Distress


HEENT: Positive: PERRL


Neck: Positive: neck supple


Cardiac: Positive: Regular Rate, S1/S2, S3, PMI, Laterally Displaced


Lungs: Positive: clear to auscultation, No Wheeze, Rales, Rhonchi


Neuro: Positive: Weakness


Abdomen: Positive: Unremarkable, Soft


Skin: Positive: Clear


Extremities: Absent: edema





- Labs and Meds


                                 Cardiac Enzymes











  08/14/21 Range/Units





  07:30 


 


AST  26  (5-40)  units/L








                                       CBC











  08/14/21 Range/Units





  07:30 


 


WBC  8.3  (4.5-11.0)  K/mm3


 


RBC  3.29 L  (3.65-5.03)  M/mm3


 


Hgb  9.6 L  (11.8-15.2)  gm/dl


 


Hct  29.0 L  (35.5-45.6)  %


 


Plt Count  180  (140-440)  K/mm3








                          Comprehensive Metabolic Panel











  08/14/21 Range/Units





  07:30 


 


Sodium  132 L  (137-145)  mmol/L


 


Potassium  3.9  (3.6-5.0)  mmol/L


 


Chloride  94.7 L  ()  mmol/L


 


Carbon Dioxide  26  (22-30)  mmol/L


 


BUN  18  (9-20)  mg/dL


 


Creatinine  1.3  (0.8-1.3)  mg/dL


 


Glucose  95  ()  mg/dL


 


Calcium  8.9  (8.4-10.2)  mg/dL


 


AST  26  (5-40)  units/L


 


ALT  20  (7-56)  units/L


 


Alkaline Phosphatase  84  ()  units/L


 


Total Protein  5.6 L  (6.3-8.2)  g/dL


 


Albumin  2.7 L  (3.9-5)  g/dL

## 2021-08-14 NOTE — PROGRESS NOTE
Assessment and Plan


Assessment and plan: 





This is 60-year-old male with HTN, DM, chronic EtOH abuse, p Olivarez's 

esophagitis, hiatal hernia, seizure disorder, A. fib, HFr EF, cardiomegaly s/p 

dual chamber AICD admitted with acute kidney injury, elevated LFT, hypokalemia, 

hypomagnesemia, A. fib with RVR





Neuro: h/o seizure disorder, h/o EtOH abuse, alcoholic withdrawal


-CIWA protocol, prn ativan


-Reorientation as needed


-Aspiration/fall/seizure precautions


-Thiamine, folic acid


-As needed Haldol


-8/11: Ordered restraints due to agitation and patient wandering today on floor.

Hallucination, fluctuating orientation on exam.


8/14: Mental status significantly improved, appears to have made it through 

worst of withdrawal.  Restraints discontinued.





Cardio: A. fib RVR, HTN, A. fib, nonischemic cardiomegaly s/p dual-chamber AICD,

HFrEF


-Cardiology consulted, appreciate recommendations


-3/2017 cardiac cath demonstrated normal coronary arteries with a ventricular 

ejection fraction of 20 to 25%


-5/2021 echocardiogram shows EF of 35 to 40%


-Blood pressure monitoring per protocol


-Amiodarone 200 mg daily, amlodipine, metoprolol


-Continue home Eliquis for anticoagulation





Respiratory: NAD


-Supplemental oxygen as needed


-Pulmonary hygiene


-SPO2 monitoring





GI: Acute on chronic abdominal pain, elevated LFTs, h/o GERD, esophagitis, 

hiatal hernia, long segment Olivarez's esophagus


-GI consulted, appreciate recommendations


-5/2020 MRCP-see GI notes for details


-6/24/2021 EGD at OSH-see GI notes for details


-PPI


-Carafate 3 times daily


-As needed Reglan


-As needed zofran


-Consistent carbohydrate cardiac renal diet





: Acute kidney injury, hypokalemia, hypomagnesemia, metabolic alkalosis, 

hyponatremia


-Nephrology consulted, appreciate recommendations


-MIVF per nephrology


-Renal ultrasound within normal limits-see results


-Repeat electrolytes aggressively


-Avoid nephrotoxic medications


-Renally dose medications


-History continue nephro


-Daily weights





Endo: NAD


-Patient has a history of " diabetes mellitus" per charting


-Hemoglobin A1c 4.8


-Patient was on Accu-Cheks every 6 while n.p.o.


-Avoid hypoglycemia





ID: NAD


-Monitor for signs or symptoms of infection


-Patient has been afebrile





Heme: NAD


-Trend CBC


-Transfuse to hemoglobin less than 7


-Patient is on Eliquis for A. fib


-PPI


-SCDs to bilateral lower extremities while in bed





8/11/2021: Patient very nauseous this morning on a.m. encounter.  Told nurse to 

give additional Zofran to patient.  He can be continued on Protonix and 

sucralfate as suggested by GI.  Discussed patient case with cardiology.  They 

recommended continued amiodarone 200 mg daily and metoprolol for atrial 

fibrillation.  In afternoon, was paged about patient agitation and 

disorientation.  Patient was seen walking in the hallways and stated that he 

wanted to go home.  RN redirected patient back to bed,  restraints were 

requested and ordered.  Patient is on CIWA protocol with as needed Ativan on 

board.  





8/12/2021: patient remains disoriented and appears tremulous. Restraints 

reordered as patient attempts to leave bed multiple times. Continuing with CIWA 

protocol. 





8/13/2021: Patient remains disoriented.  Required doses of Ativan due to 

agitation.  Will need restraints.  Will reassess through the weekend as patient 

continues to have withdrawal





8/14/2021: Patient today alert and oriented x3.  Was initially upset that no one

had seen him last few days.  However patient was educated that he was in fact 

seen and he was actively withdrawing from alcohol.  He did not recall the events

of the last 2 days.  We discussed the need for abstaining from using alcoholic 

substances or any recreational substances for that matter.  This morning he 

denied any nausea vomiting, tremulousness.  We will consult physical therapy 

today as patient will likely be able to participate now.  Restraints are now 

discontinued.





History


Interval history: 





Mental status today is improved.  Patient is alert and oriented x3.  He did not 

recall the last few days but patient was reoriented and told he was withdrawing 

from alcohol.  He denied any active complaints.  He states that he is no longer 

nauseous and has not vomited.  He does not feel tremulous and states that 

overall he is feeling well and would like to walk if he can.





Hospitalist Physical





- Physical exam


Narrative exam: 





Physical Exam:


Constitutional: Alert, no acute distress on encounter.


Head, Ears, Nose: Normocephalic, atraumatic. External ears, nose normal


Eyes: Conjunctivae/corneas clear. No icterus. No ptosis.


Neck: Supple, no meningeal signs


Oral: dentition fair, no thrush


Cardiovascular: S1, S2 normal. 


Respiratory: Good air entry, clear to auscultation bilaterally


GI: Soft,tender to palpation; bowel sounds normal. No peritoneal signs. 


Musculoskeletal: No pedal edema, no cyanosis.


Skin: No rash or abscess


Hem/Lymphatic: No palpable cervical or supraclavicular nodes. No lymphangitis


Psych: Normal thought process


Neurological: Alert and oriented x4.





- Constitutional


Vitals: 


                                        











Temp Pulse Resp BP Pulse Ox


 


 98.2 F   102 H  18   116/84   100 


 


 08/14/21 08:33  08/14/21 10:53  08/14/21 08:33  08/14/21 10:53  08/14/21 08:33











General appearance: Present: no acute distress





Results





- Labs


CBC & Chem 7: 


                                 08/14/21 07:30





                                 08/14/21 07:30


Labs: 


                             Laboratory Last Values











WBC  8.3 K/mm3 (4.5-11.0)   08/14/21  07:30    


 


RBC  3.29 M/mm3 (3.65-5.03)  L  08/14/21  07:30    


 


Hgb  9.6 gm/dl (11.8-15.2)  L  08/14/21  07:30    


 


Hct  29.0 % (35.5-45.6)  L  08/14/21  07:30    


 


MCV  88 fl (84-94)   08/14/21  07:30    


 


MCH  29 pg (28-32)   08/14/21  07:30    


 


MCHC  33 % (32-34)   08/14/21  07:30    


 


RDW  20.8 % (13.2-15.2)  H  08/14/21  07:30    


 


Plt Count  180 K/mm3 (140-440)   08/14/21  07:30    


 


Lymph % (Auto)  16.1 % (13.4-35.0)   08/10/21  04:03    


 


Mono % (Auto)  Np   08/14/21  07:30    


 


Eos % (Auto)  Np   08/14/21  07:30    


 


Baso % (Auto)  0.2 % (0.0-1.8)   08/10/21  04:03    


 


Lymph # (Auto)  0.8 K/mm3 (1.2-5.4)  L  08/10/21  04:03    


 


Mono # (Auto)  1.0 K/mm3 (0.0-0.8)  H  08/12/21  05:02    


 


Eos # (Auto)  0.0 K/mm3 (0.0-0.4)   08/10/21  04:03    


 


Baso # (Auto)  0.0 K/mm3 (0.0-0.1)   08/10/21  04:03    


 


Add Manual Diff  Complete   08/14/21  07:30    


 


Total Counted  100   08/14/21  07:30    


 


Seg Neutrophils %  Np   08/14/21  07:30    


 


Seg Neuts % (Manual)  46.0 % (40.0-70.0)   08/14/21  07:30    


 


Lymphocytes % (Manual)  9.0 % (13.4-35.0)  L  08/14/21  07:30    


 


Monocytes % (Manual)  43.0 % (0.0-7.3)  H  08/14/21  07:30    


 


Eosinophils % (Manual)  1.0 % (0.0-4.3)   08/14/21  07:30    


 


Basophils % (Manual)  1.0 % (0.0-1.8)   08/14/21  07:30    


 


Nucleated RBC %  Not Reportable   08/14/21  07:30    


 


Seg Neutrophils #  3.3 K/mm3 (1.8-7.7)   08/10/21  04:03    


 


Seg Neutrophils # Man  3.8 K/mm3 (1.8-7.7)   08/14/21  07:30    


 


Band Neutrophils #  0.0 K/mm3  08/14/21  07:30    


 


Lymphocytes # (Manual)  0.7 K/mm3 (1.2-5.4)  L  08/14/21  07:30    


 


Abs React Lymphs (Man)  0.0 K/mm3  08/14/21  07:30    


 


Monocytes # (Manual)  3.6 K/mm3 (0.0-0.8)  H  08/14/21  07:30    


 


Eosinophils # (Manual)  0.1 K/mm3 (0.0-0.4)   08/14/21  07:30    


 


Basophils # (Manual)  0.1 K/mm3 (0.0-0.1)   08/14/21  07:30    


 


Metamyelocytes #  0.0 K/mm3  08/14/21  07:30    


 


Myelocytes #  0.0 K/mm3  08/14/21  07:30    


 


Promyelocytes #  0.0 K/mm3  08/14/21  07:30    


 


Blast Cells #  0.0 K/mm3  08/14/21  07:30    


 


WBC Morphology  Not Reportable   08/14/21  07:30    


 


WBC Morphology  TNR   08/14/21  07:30    


 


Hypersegmented Neuts  Not Reportable   08/14/21  07:30    


 


Hyposegmented Neuts  Not Reportable   08/14/21  07:30    


 


Hypogranular Neuts  Not Reportable   08/14/21  07:30    


 


Smudge Cells  Not Reportable   08/14/21  07:30    


 


Toxic Granulation  Not Reportable   08/14/21  07:30    


 


Toxic Vacuolation  Not Reportable   08/14/21  07:30    


 


Dohle Bodies  Not Reportable   08/14/21  07:30    


 


Pelger-Huet Anomaly  Not Reportable   08/14/21  07:30    


 


Peyton Rods  Not Reportable   08/14/21  07:30    


 


Platelet Estimate  Consistent w auto   08/14/21  07:30    


 


Clumped Platelets  Not Reportable   08/14/21  07:30    


 


Plt Clumps, EDTA  Not Reportable   08/14/21  07:30    


 


Large Platelets  Not Reportable   08/14/21  07:30    


 


Giant Platelets  Not Reportable   08/14/21  07:30    


 


Platelet Satelliting  Not Reportable   08/14/21  07:30    


 


Plt Morphology Comment  Not Reportable   08/14/21  07:30    


 


RBC Morphology  Not Reportable   08/14/21  07:30    


 


Dimorphic RBCs  Not Reportable   08/14/21  07:30    


 


Polychromasia  Not Reportable   08/14/21  07:30    


 


Hypochromasia  Few   08/14/21  07:30    


 


Poikilocytosis  Not Reportable   08/14/21  07:30    


 


Anisocytosis  Not Reportable   08/14/21  07:30    


 


Microcytosis  Not Reportable   08/14/21  07:30    


 


Macrocytosis  Not Reportable   08/14/21  07:30    


 


Spherocytes  Not Reportable   08/14/21  07:30    


 


Pappenheimer Bodies  Not Reportable   08/14/21  07:30    


 


Sickle Cells  Not Reportable   08/14/21  07:30    


 


Target Cells  Few   08/14/21  07:30    


 


Tear Drop Cells  Not Reportable   08/14/21  07:30    


 


Ovalocytes  Not Reportable   08/14/21  07:30    


 


Helmet Cells  Not Reportable   08/14/21  07:30    


 


Aguirre-Arimo Bodies  Not Reportable   08/14/21  07:30    


 


Cabot Rings  Not Reportable   08/14/21  07:30    


 


Danette Cells  Not Reportable   08/14/21  07:30    


 


Bite Cells  Not Reportable   08/14/21  07:30    


 


Crenated Cell  Not Reportable   08/14/21  07:30    


 


Elliptocytes  Few   08/14/21  07:30    


 


Acanthocytes (Spur)  Not Reportable   08/14/21  07:30    


 


Rouleaux  Not Reportable   08/14/21  07:30    


 


Hemoglobin C Crystals  Not Reportable   08/14/21  07:30    


 


Schistocytes  Not Reportable   08/14/21  07:30    


 


Malaria parasites  Not Reportable   08/14/21  07:30    


 


George Bodies  Not Reportable   08/14/21  07:30    


 


Hem Pathologist Commnt  No   08/14/21  07:30    


 


PT  13.9 Sec. (12.2-14.9)   08/08/21  21:02    


 


INR  1.02  (0.87-1.13)   08/08/21  21:02    


 


APTT  28.6 Sec. (24.2-36.6)   08/08/21  21:02    


 


Sodium  132 mmol/L (137-145)  L  08/14/21  07:30    


 


Potassium  3.9 mmol/L (3.6-5.0)   08/14/21  07:30    


 


Chloride  94.7 mmol/L ()  L  08/14/21  07:30    


 


Carbon Dioxide  26 mmol/L (22-30)   08/14/21  07:30    


 


Anion Gap  15 mmol/L  08/14/21  07:30    


 


BUN  18 mg/dL (9-20)   08/14/21  07:30    


 


Creatinine  1.3 mg/dL (0.8-1.3)   08/14/21  07:30    


 


Estimated GFR  > 60 ml/min  08/14/21  07:30    


 


BUN/Creatinine Ratio  14 %  08/14/21  07:30    


 


Glucose  95 mg/dL ()   08/14/21  07:30    


 


Hemoglobin A1c  4.8 % (4-6)   08/10/21  04:03    


 


Calcium  8.9 mg/dL (8.4-10.2)   08/14/21  07:30    


 


Phosphorus  3.00 mg/dL (2.5-4.5)   08/14/21  07:30    


 


Magnesium  1.10 mg/dL (1.7-2.3)  L  08/14/21  07:30    


 


Total Bilirubin  0.40 mg/dL (0.1-1.2)   08/14/21  07:30    


 


Direct Bilirubin  0.6 mg/dL (0-0.2)  H  08/10/21  04:03    


 


Indirect Bilirubin  0.8 mg/dL  08/10/21  04:03    


 


AST  26 units/L (5-40)   08/14/21  07:30    


 


ALT  20 units/L (7-56)   08/14/21  07:30    


 


Alkaline Phosphatase  84 units/L ()   08/14/21  07:30    


 


Total Protein  5.6 g/dL (6.3-8.2)  L  08/14/21  07:30    


 


Albumin  2.7 g/dL (3.9-5)  L  08/14/21  07:30    


 


Albumin/Globulin Ratio  0.9 %  08/14/21  07:30    


 


Lipase  20 units/L (13-60)   08/08/21  19:52    


 


Nasal Screen MRSA (PCR)  Negative  (Negative)   08/11/21  15:46    


 


Plasma/Serum Alcohol  0.18 % (0-0.07)  H  08/08/21  21:02    











[place]/IV: 


                                        





Voiding Method                   Incontinent











Active Medications





- Current Medications


Current Medications: 














Generic Name Dose Route Start Last Admin





  Trade Name Freq  PRN Reason Stop Dose Admin


 


Acetaminophen  650 mg  08/09/21 00:17 





  Acetaminophen 325 Mg Tab  PO  





  Q4H PRN  





  Pain MILD(1-3)/Fever >100.5/HA  


 


Amiodarone HCl  200 mg  08/11/21 10:00  08/14/21 10:55





  Amiodarone 200 Mg Tab  PO   200 mg





  QDAY JORDANA   Administration


 


Apixaban  5 mg  08/09/21 10:00  08/13/21 09:33





  Apixaban 5 Mg Tab  PO   5 mg





  Q48HR JORDANA   Administration


 


Dextrose  50 ml  08/10/21 12:24 





  Dextrose 50% In Water (25gm) 50 Ml Syringe  IV  





  Q30MIN PRN  





  Hypoglycemia  





  Protocol  


 


Folic Acid  1 mg  08/11/21 10:00  08/14/21 10:55





  Folic Acid 1 Mg Tab  PO   1 mg





  QDAY JORDANA   Administration


 


Haloperidol Lactate  5 mg  08/09/21 00:17  08/13/21 09:33





  Haloperidol Lactate 5 Mg/1 Ml Inj  IV   5 mg





  Q1H PRN   Administration





  Unrespon. to mult. doses BZD's  


 


Hydralazine HCl  10 mg  08/10/21 10:05 





  Hydralazine 20 Mg/1 Ml Inj  IV  





  Q4HR PRN  





  Hypertension  


 


Thiamine HCl 100 mg/ Sodium  51 mls @ 100 mls/hr  08/13/21 10:00  08/13/21 15:01





  Chloride  IV  08/15/21 10:31  100 mls/hr





  QDAY JORDANA   Administration


 


Levetiracetam  750 mg  08/10/21 22:00  08/14/21 10:55





  Levetiracetam 500 Mg Tab  PO   750 mg





  BID JORDANA   Administration


 


Lorazepam  2 mg  08/09/21 00:17  08/13/21 04:46





  Lorazepam 2 Mg/Ml Vial  IV   2 mg





  Q1H PRN   Administration





  CIWA-Ar 8-15  


 


Metoclopramide HCl  5 mg  08/09/21 12:00  08/14/21 06:17





  Metoclopramide 10 Mg/2 Ml Inj  IV   5 mg





  Q6H JORDANA   Administration


 


Metoprolol Tartrate  50 mg  08/10/21 22:00  08/14/21 10:53





  Metoprolol Tartrate 50 Mg Tab  PO   50 mg





  BID JORDANA   Administration


 


Morphine Sulfate  2 mg  08/09/21 12:00 





  Morphine 2 Mg/1 Ml Inj  IV  





  Q4H PRN  





  Pain , Severe (7-10)  


 


Naloxone HCl  0.1 mg  08/09/21 00:17 





  Naloxone 0.4 Mg/1 Ml Inj  IV  





  Q2MIN PRN  





  Res Rate </= 8 or 02 SAT < 92%  


 


Ondansetron HCl  4 mg  08/09/21 00:17  08/11/21 10:09





  Ondansetron 4 Mg/2 Ml Inj  IV   4 mg





  Q8H PRN   Administration





  Nausea And Vomiting  


 


Pantoprazole Sodium  40 mg  08/13/21 16:30  08/14/21 10:54





  Pantoprazole 40 Mg Tab  PO   40 mg





  BIDAC JORADNA   Administration


 


Potassium Chloride  40 meq  08/13/21 10:00  08/14/21 10:54





  Potassium Chloride Er 20 Meq Tab  PO   40 meq





  QDAY JORDANA   Administration


 


Potassium Phos/Sodium Phos  1 each  08/11/21 14:00  08/14/21 06:15





  Phos-Nak Powder Packet  PO   1 each





  Q8HR JORDANA   Administration


 


Scopolamine  1 each  08/09/21 13:00  08/12/21 09:46





  Scopolamine Transdermal Patch 72 Hr  TD   1 each





  Q3D JORDANA   Administration


 


Sodium Chloride  10 ml  08/09/21 10:00  08/14/21 10:55





  Sodium Chloride 0.9% 10 Ml Flush Syringe  IV   10 ml





  BID JORDANA   Administration


 


Sodium Chloride  10 ml  08/09/21 00:17  08/11/21 18:56





  Sodium Chloride 0.9% 10 Ml Flush Syringe  IV   10 ml





  PRN PRN   Administration





  LINE FLUSH  


 


Sucralfate  1 gm  08/09/21 18:00  08/14/21 06:15





  Sucralfate 1 Gm/10 Ml Oral Liqd  PO   1 gm





  Q6HR JORDANA   Administration

## 2021-08-14 NOTE — PROGRESS NOTE
Subjective


Interval history: 


Patient was seen today for follow-up of multiple renal related issues


No complaints of any chest pain pressure or shortness of breath


Interdisciplinary notes that also reviewed


Events of 24 hours vitals labs intake output medications were reviewed








Past medical history: Reviewed


Family history: Reviewed


Social history: Reviewed


Allergies: Reviewed








Physical examination:


Vitals: Reviewed


HEENT: No pallor or icterus oral mucosa moist 


Neck: Supple no JVD no thyromegaly


Chest: Bilateral clear to auscultation anteriorly


Heart: Regular rate and rhythm S1-S2 heard no S3-S4


Abdomen: Soft nontender no voluntary guarding rigidity rebound


Extremity: Dry skin less than 1+ peripheral edema


Psychiatric: No evidence of agitation and aggression noted


Dermatology: No petechial rashes





Labs and x-rays: Reviewed from today





Assessment and plan





#Acute kidney injury patient admitted with dehydration and alcohol abuse and 

possible prerenal injury


#From renal standpoint patient is doing much better sodium is 132 potassium is 

3.9 bicarbonate normal creatinine has normalized 


At this time we will sign off the case please call if needed continue to replace

magnesium given the history of alcohol abuse,





Patient needs to take responsibility for his health and absence of which is 

overall prognosis in general is going to be extremely poor may benefit from 

psychiatric evaluation down the line








Objective





- Vital Signs


Vital signs: 


                               Vital Signs - 12hr











  08/13/21 08/14/21 08/14/21





  22:38 06:18 08:33


 


Temperature  100.8 F H 98.2 F


 


Pulse Rate 109 H 98 H 102 H


 


Respiratory  18 18





Rate   


 


Blood Pressure  135/91 116/84


 


O2 Sat by Pulse 98 97 100





Oximetry   














- Lab





                                 08/12/21 05:02





                                 08/14/21 07:30


                             Most recent lab results











Calcium  8.9 mg/dL (8.4-10.2)   08/14/21  07:30    


 


Phosphorus  3.00 mg/dL (2.5-4.5)   08/14/21  07:30    


 


Magnesium  1.10 mg/dL (1.7-2.3)  L  08/14/21  07:30    














Medications & Allergies





- Medications


Allergies/Adverse Reactions: 


                                    Allergies





No Known Allergies Allergy (Verified 12/01/20 07:33)


   








Home Medications: 


                                Home Medications











 Medication  Instructions  Recorded  Confirmed  Last Taken  Type


 


Pantoprazole [Protonix TAB] 40 mg PO BID #60 tablet 11/07/19 08/14/21 Unknown Rx


 


DULoxetine [Cymbalta] 30 mg PO DAILY 09/12/20 08/14/21 Unknown History


 


Apixaban [Eliquis] 5 mg PO Q48HR 12/01/20 08/14/21 Unknown History


 


Gabapentin 300 mg PO DAILY 12/01/20 08/14/21 Unknown History


 


Ondansetron [Zofran ODT TAB] 4 mg PO Q8HR 12/01/20 08/14/21 Unknown History


 


Spironolactone [Aldactone] 25 mg PO QDAY 12/01/20 08/14/21 Unknown History


 


Acetaminophen [Acetaminophen TAB] 650 mg PO Q4H PRN  tablet 12/03/20 08/14/21 

Unknown Rx


 


Magnesium Oxide [Mag-Ox] 400 mg PO QDAY 7 Days #7 tablet 12/03/20 08/14/21 

Unknown Rx


 


Metoprolol Xl [Metoprolol 50 mg PO QDAY #30 tablet 12/03/20 08/14/21 Unknown Rx





SUCCINATE ER TAB]     


 


amLODIPine 10 mg PO QDAY #30 tablet 12/03/20 08/14/21 Unknown Rx


 


levETIRAcetam [Keppra] 750 mg PO BID #60 oral.liqd 12/03/20 08/14/21 Unknown Rx


 


oxyCODONE /ACETAMINOPHEN [Percocet 1 tab PO Q6H PRN  tablet 12/03/20 08/14/21 

Unknown Rx





5/325 mg]     


 


traMADoL [Ultram 50 MG tab] 50 mg PO Q6H PRN #30 tablet 12/03/20 08/14/21 

Unknown Rx


 


Amiodarone [Cordarone 200 MG TAB] 200 mg PO BID #60 tablet 04/06/21 08/14/21 

Unknown Rx


 


Folic Acid [Folvite] 1 mg PO QDAY #30 tablet 04/06/21 08/14/21 Unknown Rx


 


Thiamine [Vitamin B-1] 100 mg PO QDAY #30 tablet 04/06/21 08/14/21 Unknown Rx











Active Medications: 














Generic Name Dose Route Start Last Admin





  Trade Name Freq  PRN Reason Stop Dose Admin


 


Acetaminophen  650 mg  08/09/21 00:17 





  Acetaminophen 325 Mg Tab  PO  





  Q4H PRN  





  Pain MILD(1-3)/Fever >100.5/HA  


 


Amiodarone HCl  200 mg  08/11/21 10:00  08/13/21 09:33





  Amiodarone 200 Mg Tab  PO   200 mg





  QDAY JORDANA   Administration


 


Apixaban  5 mg  08/09/21 10:00  08/13/21 09:33





  Apixaban 5 Mg Tab  PO   5 mg





  Q48HR JORDANA   Administration


 


Dextrose  50 ml  08/10/21 12:24 





  Dextrose 50% In Water (25gm) 50 Ml Syringe  IV  





  Q30MIN PRN  





  Hypoglycemia  





  Protocol  


 


Folic Acid  1 mg  08/11/21 10:00  08/13/21 09:33





  Folic Acid 1 Mg Tab  PO   1 mg





  QDAY JORDANA   Administration


 


Haloperidol Lactate  5 mg  08/09/21 00:17  08/13/21 09:33





  Haloperidol Lactate 5 Mg/1 Ml Inj  IV   5 mg





  Q1H PRN   Administration





  Unrespon. to mult. doses BZD's  


 


Hydralazine HCl  10 mg  08/10/21 10:05 





  Hydralazine 20 Mg/1 Ml Inj  IV  





  Q4HR PRN  





  Hypertension  


 


Thiamine HCl 100 mg/ Sodium  51 mls @ 100 mls/hr  08/13/21 10:00  08/13/21 15:01





  Chloride  IV  08/15/21 10:31  100 mls/hr





  QDAY JORDANA   Administration


 


Levetiracetam  750 mg  08/10/21 22:00  08/13/21 22:41





  Levetiracetam 500 Mg Tab  PO   750 mg





  BID JORDANA   Administration


 


Lorazepam  2 mg  08/09/21 00:17  08/13/21 04:46





  Lorazepam 2 Mg/Ml Vial  IV   2 mg





  Q1H PRN   Administration





  NOA-Ar 8-15  


 


Metoclopramide HCl  5 mg  08/09/21 12:00  08/14/21 06:17





  Metoclopramide 10 Mg/2 Ml Inj  IV   5 mg





  Q6H JORDANA   Administration


 


Metoprolol Tartrate  50 mg  08/10/21 22:00  08/13/21 22:41





  Metoprolol Tartrate 50 Mg Tab  PO   50 mg





  BID JORDANA   Administration


 


Morphine Sulfate  2 mg  08/09/21 12:00 





  Morphine 2 Mg/1 Ml Inj  IV  





  Q4H PRN  





  Pain , Severe (7-10)  


 


Naloxone HCl  0.1 mg  08/09/21 00:17 





  Naloxone 0.4 Mg/1 Ml Inj  IV  





  Q2MIN PRN  





  Res Rate </= 8 or 02 SAT < 92%  


 


Ondansetron HCl  4 mg  08/09/21 00:17  08/11/21 10:09





  Ondansetron 4 Mg/2 Ml Inj  IV   4 mg





  Q8H PRN   Administration





  Nausea And Vomiting  


 


Pantoprazole Sodium  40 mg  08/13/21 16:30  08/13/21 16:31





  Pantoprazole 40 Mg Tab  PO   Not Given





  BIDAC JORDANA  


 


Potassium Chloride  40 meq  08/13/21 10:00  08/13/21 09:33





  Potassium Chloride Er 20 Meq Tab  PO   40 meq





  QDAY JORDANA   Administration


 


Potassium Phos/Sodium Phos  1 each  08/11/21 14:00  08/14/21 06:15





  Phos-Nak Powder Packet  PO   1 each





  Q8HR JORDANA   Administration


 


Scopolamine  1 each  08/09/21 13:00  08/12/21 09:46





  Scopolamine Transdermal Patch 72 Hr  TD   1 each





  Q3D JORDANA   Administration


 


Sodium Chloride  10 ml  08/09/21 10:00  08/13/21 22:41





  Sodium Chloride 0.9% 10 Ml Flush Syringe  IV   10 ml





  BID JORDANA   Administration


 


Sodium Chloride  10 ml  08/09/21 00:17  08/11/21 18:56





  Sodium Chloride 0.9% 10 Ml Flush Syringe  IV   10 ml





  PRN PRN   Administration





  LINE FLUSH  


 


Sucralfate  1 gm  08/09/21 18:00  08/14/21 06:15





  Sucralfate 1 Gm/10 Ml Oral Liqd  PO   1 gm





  Q6HR JORDANA   Administration

## 2021-08-15 RX ADMIN — POTASSIUM & SODIUM PHOSPHATES POWDER PACK 280-160-250 MG SCH EACH: 280-160-250 PACK at 16:57

## 2021-08-15 RX ADMIN — ACETAMINOPHEN PRN MG: 325 TABLET ORAL at 16:58

## 2021-08-15 RX ADMIN — SUCRALFATE ORAL SCH GM: 1 SUSPENSION ORAL at 06:51

## 2021-08-15 RX ADMIN — Medication SCH ML: at 10:34

## 2021-08-15 RX ADMIN — Medication SCH ML: at 21:33

## 2021-08-15 RX ADMIN — PANTOPRAZOLE SODIUM SCH MG: 40 TABLET, DELAYED RELEASE ORAL at 06:51

## 2021-08-15 RX ADMIN — MORPHINE SULFATE PRN MG: 2 INJECTION, SOLUTION INTRAMUSCULAR; INTRAVENOUS at 21:23

## 2021-08-15 RX ADMIN — METOCLOPRAMIDE SCH MG: 5 INJECTION, SOLUTION INTRAMUSCULAR; INTRAVENOUS at 00:30

## 2021-08-15 RX ADMIN — AMIODARONE HYDROCHLORIDE SCH MG: 200 TABLET ORAL at 10:32

## 2021-08-15 RX ADMIN — MORPHINE SULFATE PRN MG: 2 INJECTION, SOLUTION INTRAMUSCULAR; INTRAVENOUS at 16:12

## 2021-08-15 RX ADMIN — POTASSIUM & SODIUM PHOSPHATES POWDER PACK 280-160-250 MG SCH EACH: 280-160-250 PACK at 06:51

## 2021-08-15 RX ADMIN — SUCRALFATE ORAL SCH: 1 SUSPENSION ORAL at 12:00

## 2021-08-15 RX ADMIN — POTASSIUM CHLORIDE SCH MEQ: 1500 TABLET, EXTENDED RELEASE ORAL at 10:32

## 2021-08-15 RX ADMIN — FOLIC ACID SCH MG: 1 TABLET ORAL at 10:32

## 2021-08-15 RX ADMIN — THIAMINE HYDROCHLORIDE SCH MLS/HR: 100 INJECTION, SOLUTION INTRAMUSCULAR; INTRAVENOUS at 10:35

## 2021-08-15 RX ADMIN — POTASSIUM & SODIUM PHOSPHATES POWDER PACK 280-160-250 MG SCH EACH: 280-160-250 PACK at 21:22

## 2021-08-15 RX ADMIN — APIXABAN SCH MG: 5 TABLET, FILM COATED ORAL at 10:32

## 2021-08-15 RX ADMIN — MORPHINE SULFATE PRN MG: 2 INJECTION, SOLUTION INTRAMUSCULAR; INTRAVENOUS at 06:54

## 2021-08-15 RX ADMIN — METOCLOPRAMIDE SCH MG: 5 INJECTION, SOLUTION INTRAMUSCULAR; INTRAVENOUS at 06:54

## 2021-08-15 RX ADMIN — METOPROLOL TARTRATE SCH MG: 50 TABLET, FILM COATED ORAL at 21:22

## 2021-08-15 RX ADMIN — METOPROLOL TARTRATE SCH MG: 50 TABLET, FILM COATED ORAL at 10:32

## 2021-08-15 RX ADMIN — METOCLOPRAMIDE SCH: 5 INJECTION, SOLUTION INTRAMUSCULAR; INTRAVENOUS at 12:00

## 2021-08-15 RX ADMIN — PANTOPRAZOLE SODIUM SCH MG: 40 TABLET, DELAYED RELEASE ORAL at 16:57

## 2021-08-15 RX ADMIN — METOCLOPRAMIDE SCH: 5 INJECTION, SOLUTION INTRAMUSCULAR; INTRAVENOUS at 18:16

## 2021-08-15 RX ADMIN — SCOPALAMINE SCH EACH: 1 PATCH, EXTENDED RELEASE TRANSDERMAL at 10:33

## 2021-08-15 RX ADMIN — SUCRALFATE ORAL SCH GM: 1 SUSPENSION ORAL at 18:16

## 2021-08-15 NOTE — PROGRESS NOTE
Assessment and Plan


Assessment and plan: 





This is 60-year-old male with HTN, DM, chronic EtOH abuse, p Olivarez's 

esophagitis, hiatal hernia, seizure disorder, A. fib, HFr EF, cardiomegaly s/p 

dual chamber AICD admitted with acute kidney injury, elevated LFT, hypokalemia, 

hypomagnesemia, A. fib with RVR





Neuro: h/o seizure disorder, h/o EtOH abuse, alcoholic withdrawal


-CIWA protocol, prn ativan


-Reorientation as needed


-Aspiration/fall/seizure precautions


-Thiamine, folic acid


-As needed Haldol


-8/11: Ordered restraints due to agitation and patient wandering today on floor.

Hallucination, fluctuating orientation on exam.


8/14: Mental status significantly improved, appears to have made it through 

worst of withdrawal.  Restraints discontinued.





Cardio: A. fib RVR, HTN, A. fib, nonischemic cardiomegaly s/p dual-chamber AICD,

HFrEF


-Cardiology consulted, appreciate recommendations


-3/2017 cardiac cath demonstrated normal coronary arteries with a ventricular 

ejection fraction of 20 to 25%


-5/2021 echocardiogram shows EF of 35 to 40%


-Blood pressure monitoring per protocol


-Amiodarone 200 mg daily, amlodipine, metoprolol


-Continue home Eliquis for anticoagulation





Respiratory: NAD


-Supplemental oxygen as needed


-Pulmonary hygiene


-SPO2 monitoring





GI: Acute on chronic abdominal pain, elevated LFTs, h/o GERD, esophagitis, 

hiatal hernia, long segment Olivarez's esophagus


-GI consulted, appreciate recommendations


-5/2020 MRCP-see GI notes for details


-6/24/2021 EGD at OSH-see GI notes for details


-PPI


-Carafate 3 times daily


-As needed Reglan


-As needed zofran


-Consistent carbohydrate cardiac renal diet





: Acute kidney injury, hypokalemia, hypomagnesemia, metabolic alkalosis, 

hyponatremia


-Nephrology consulted, appreciate recommendations


-MIVF per nephrology


-Renal ultrasound within normal limits-see results


-Repeat electrolytes aggressively


-Avoid nephrotoxic medications


-Renally dose medications


-History continue nephro


-Daily weights





Endo: NAD


-Patient has a history of " diabetes mellitus" per charting


-Hemoglobin A1c 4.8


-Patient was on Accu-Cheks every 6 while n.p.o.


-Avoid hypoglycemia





ID: NAD


-Monitor for signs or symptoms of infection


-Patient has been afebrile





Heme: NAD


-Trend CBC


-Transfuse to hemoglobin less than 7


-Patient is on Eliquis for A. fib


-PPI


-SCDs to bilateral lower extremities while in bed





8/11/2021: Patient very nauseous this morning on a.m. encounter.  Told nurse to 

give additional Zofran to patient.  He can be continued on Protonix and 

sucralfate as suggested by GI.  Discussed patient case with cardiology.  They 

recommended continued amiodarone 200 mg daily and metoprolol for atrial 

fibrillation.  In afternoon, was paged about patient agitation and 

disorientation.  Patient was seen walking in the hallways and stated that he 

wanted to go home.  RN redirected patient back to bed,  restraints were 

requested and ordered.  Patient is on CIWA protocol with as needed Ativan on 

board.  





8/12/2021: patient remains disoriented and appears tremulous. Restraints 

reordered as patient attempts to leave bed multiple times. Continuing with CIWA 

protocol. 





8/13/2021: Patient remains disoriented.  Required doses of Ativan due to 

agitation.  Will need restraints.  Will reassess through the weekend as patient 

continues to have withdrawal





8/14/2021: Patient today alert and oriented x3.  Was initially upset that no one

had seen him last few days.  However patient was reassured that he was in fact 

seen and he was actively withdrawing from alcohol.  He did not recall the events

of the last 2 days.  We discussed the need for abstaining from using alcoholic 

substances or any recreational substances for that matter.  This morning he 

denied any nausea vomiting, tremulousness.  We will consult physical therapy 

today as patient will likely be able to participate now.  Restraints are now 

discontinued.





8/15/2021: Still nausaeous at times. However, AOx3. Mentation significantly 

improved. Will follow up on PT evaluation. Patient also had temp of 102F. 

Ordered repeat Bcx, UCx, cxr. 





History


Interval history: 


Nauseaous while working with physical therapy. Had fever recorded this AM and 

this afternoon. Discussed with RN regarding reculture, CXR. 





Hospitalist Physical





- Physical exam


Narrative exam: 





Physical Exam:


Constitutional: Alert, no acute distress on encounter.


Head, Ears, Nose: Normocephalic, atraumatic. External ears, nose normal


Eyes: Conjunctivae/corneas clear. No icterus. No ptosis.


Neck: Supple, no meningeal signs


Oral: dentition fair, no thrush


Cardiovascular: S1, S2 normal. 


Respiratory: Good air entry, clear to auscultation bilaterally


GI: Soft,tender to palpation; bowel sounds normal. No peritoneal signs. 


Musculoskeletal: No pedal edema, no cyanosis.


Skin: No rash or abscess


Hem/Lymphatic: No palpable cervical or supraclavicular nodes. No lymphangitis


Psych: Normal thought process


Neurological: Alert and oriented x4.





- Constitutional


Vitals: 


                                        











Temp Pulse Resp BP Pulse Ox


 


 99.0 F   114 H  18   123/75   100 


 


 08/15/21 04:46  08/15/21 08:03  08/15/21 04:46  08/15/21 04:46  08/15/21 08:03











General appearance: Present: no acute distress





Results





- Labs


CBC & Chem 7: 


                                 08/14/21 07:30





                                 08/14/21 07:30


Labs: 


                             Laboratory Last Values











WBC  8.3 K/mm3 (4.5-11.0)   08/14/21  07:30    


 


RBC  3.29 M/mm3 (3.65-5.03)  L  08/14/21  07:30    


 


Hgb  9.6 gm/dl (11.8-15.2)  L  08/14/21  07:30    


 


Hct  29.0 % (35.5-45.6)  L  08/14/21  07:30    


 


MCV  88 fl (84-94)   08/14/21  07:30    


 


MCH  29 pg (28-32)   08/14/21  07:30    


 


MCHC  33 % (32-34)   08/14/21  07:30    


 


RDW  20.8 % (13.2-15.2)  H  08/14/21  07:30    


 


Plt Count  180 K/mm3 (140-440)   08/14/21  07:30    


 


Lymph % (Auto)  16.1 % (13.4-35.0)   08/10/21  04:03    


 


Mono % (Auto)  Np   08/14/21  07:30    


 


Eos % (Auto)  Np   08/14/21  07:30    


 


Baso % (Auto)  0.2 % (0.0-1.8)   08/10/21  04:03    


 


Lymph # (Auto)  0.8 K/mm3 (1.2-5.4)  L  08/10/21  04:03    


 


Mono # (Auto)  1.0 K/mm3 (0.0-0.8)  H  08/12/21  05:02    


 


Eos # (Auto)  0.0 K/mm3 (0.0-0.4)   08/10/21  04:03    


 


Baso # (Auto)  0.0 K/mm3 (0.0-0.1)   08/10/21  04:03    


 


Add Manual Diff  Complete   08/14/21  07:30    


 


Total Counted  100   08/14/21  07:30    


 


Seg Neutrophils %  Np   08/14/21  07:30    


 


Seg Neuts % (Manual)  46.0 % (40.0-70.0)   08/14/21  07:30    


 


Lymphocytes % (Manual)  9.0 % (13.4-35.0)  L  08/14/21  07:30    


 


Monocytes % (Manual)  43.0 % (0.0-7.3)  H  08/14/21  07:30    


 


Eosinophils % (Manual)  1.0 % (0.0-4.3)   08/14/21  07:30    


 


Basophils % (Manual)  1.0 % (0.0-1.8)   08/14/21  07:30    


 


Nucleated RBC %  Not Reportable   08/14/21  07:30    


 


Seg Neutrophils #  3.3 K/mm3 (1.8-7.7)   08/10/21  04:03    


 


Seg Neutrophils # Man  3.8 K/mm3 (1.8-7.7)   08/14/21  07:30    


 


Band Neutrophils #  0.0 K/mm3  08/14/21  07:30    


 


Lymphocytes # (Manual)  0.7 K/mm3 (1.2-5.4)  L  08/14/21  07:30    


 


Abs React Lymphs (Man)  0.0 K/mm3  08/14/21  07:30    


 


Monocytes # (Manual)  3.6 K/mm3 (0.0-0.8)  H  08/14/21  07:30    


 


Eosinophils # (Manual)  0.1 K/mm3 (0.0-0.4)   08/14/21  07:30    


 


Basophils # (Manual)  0.1 K/mm3 (0.0-0.1)   08/14/21  07:30    


 


Metamyelocytes #  0.0 K/mm3  08/14/21  07:30    


 


Myelocytes #  0.0 K/mm3  08/14/21  07:30    


 


Promyelocytes #  0.0 K/mm3  08/14/21  07:30    


 


Blast Cells #  0.0 K/mm3  08/14/21  07:30    


 


WBC Morphology  Not Reportable   08/14/21  07:30    


 


WBC Morphology  TNR   08/14/21  07:30    


 


Hypersegmented Neuts  Not Reportable   08/14/21  07:30    


 


Hyposegmented Neuts  Not Reportable   08/14/21  07:30    


 


Hypogranular Neuts  Not Reportable   08/14/21  07:30    


 


Smudge Cells  Not Reportable   08/14/21  07:30    


 


Toxic Granulation  Not Reportable   08/14/21  07:30    


 


Toxic Vacuolation  Not Reportable   08/14/21  07:30    


 


Dohle Bodies  Not Reportable   08/14/21  07:30    


 


Pelger-Huet Anomaly  Not Reportable   08/14/21  07:30    


 


Peyton Rods  Not Reportable   08/14/21  07:30    


 


Platelet Estimate  Consistent w auto   08/14/21  07:30    


 


Clumped Platelets  Not Reportable   08/14/21  07:30    


 


Plt Clumps, EDTA  Not Reportable   08/14/21  07:30    


 


Large Platelets  Not Reportable   08/14/21  07:30    


 


Giant Platelets  Not Reportable   08/14/21  07:30    


 


Platelet Satelliting  Not Reportable   08/14/21  07:30    


 


Plt Morphology Comment  Not Reportable   08/14/21  07:30    


 


RBC Morphology  Not Reportable   08/14/21  07:30    


 


Dimorphic RBCs  Not Reportable   08/14/21  07:30    


 


Polychromasia  Not Reportable   08/14/21  07:30    


 


Hypochromasia  Few   08/14/21  07:30    


 


Poikilocytosis  Not Reportable   08/14/21  07:30    


 


Anisocytosis  Not Reportable   08/14/21  07:30    


 


Microcytosis  Not Reportable   08/14/21  07:30    


 


Macrocytosis  Not Reportable   08/14/21  07:30    


 


Spherocytes  Not Reportable   08/14/21  07:30    


 


Pappenheimer Bodies  Not Reportable   08/14/21  07:30    


 


Sickle Cells  Not Reportable   08/14/21  07:30    


 


Target Cells  Few   08/14/21  07:30    


 


Tear Drop Cells  Not Reportable   08/14/21  07:30    


 


Ovalocytes  Not Reportable   08/14/21  07:30    


 


Helmet Cells  Not Reportable   08/14/21  07:30    


 


Aguirre-Edroy Bodies  Not Reportable   08/14/21  07:30    


 


Cabot Rings  Not Reportable   08/14/21  07:30    


 


Danette Cells  Not Reportable   08/14/21  07:30    


 


Bite Cells  Not Reportable   08/14/21  07:30    


 


Crenated Cell  Not Reportable   08/14/21  07:30    


 


Elliptocytes  Few   08/14/21  07:30    


 


Acanthocytes (Spur)  Not Reportable   08/14/21  07:30    


 


Rouleaux  Not Reportable   08/14/21  07:30    


 


Hemoglobin C Crystals  Not Reportable   08/14/21  07:30    


 


Schistocytes  Not Reportable   08/14/21  07:30    


 


Malaria parasites  Not Reportable   08/14/21  07:30    


 


George Bodies  Not Reportable   08/14/21  07:30    


 


Hem Pathologist Commnt  No   08/14/21  07:30    


 


PT  13.9 Sec. (12.2-14.9)   08/08/21  21:02    


 


INR  1.02  (0.87-1.13)   08/08/21  21:02    


 


APTT  28.6 Sec. (24.2-36.6)   08/08/21  21:02    


 


Sodium  132 mmol/L (137-145)  L  08/14/21  07:30    


 


Potassium  3.9 mmol/L (3.6-5.0)   08/14/21  07:30    


 


Chloride  94.7 mmol/L ()  L  08/14/21  07:30    


 


Carbon Dioxide  26 mmol/L (22-30)   08/14/21  07:30    


 


Anion Gap  15 mmol/L  08/14/21  07:30    


 


BUN  18 mg/dL (9-20)   08/14/21  07:30    


 


Creatinine  1.3 mg/dL (0.8-1.3)   08/14/21  07:30    


 


Estimated GFR  > 60 ml/min  08/14/21  07:30    


 


BUN/Creatinine Ratio  14 %  08/14/21  07:30    


 


Glucose  95 mg/dL ()   08/14/21  07:30    


 


Hemoglobin A1c  4.8 % (4-6)   08/10/21  04:03    


 


Calcium  8.9 mg/dL (8.4-10.2)   08/14/21  07:30    


 


Phosphorus  3.00 mg/dL (2.5-4.5)   08/14/21  07:30    


 


Magnesium  1.20 mg/dL (1.7-2.3)  L  08/15/21  04:35    


 


Total Bilirubin  0.40 mg/dL (0.1-1.2)   08/14/21  07:30    


 


Direct Bilirubin  0.6 mg/dL (0-0.2)  H  08/10/21  04:03    


 


Indirect Bilirubin  0.8 mg/dL  08/10/21  04:03    


 


AST  26 units/L (5-40)   08/14/21  07:30    


 


ALT  20 units/L (7-56)   08/14/21  07:30    


 


Alkaline Phosphatase  84 units/L ()   08/14/21  07:30    


 


Total Protein  5.6 g/dL (6.3-8.2)  L  08/14/21  07:30    


 


Albumin  2.7 g/dL (3.9-5)  L  08/14/21  07:30    


 


Albumin/Globulin Ratio  0.9 %  08/14/21  07:30    


 


Lipase  20 units/L (13-60)   08/08/21  19:52    


 


Nasal Screen MRSA (PCR)  Negative  (Negative)   08/11/21  15:46    


 


Plasma/Serum Alcohol  0.18 % (0-0.07)  H  08/08/21  21:02    











[place]/IV: 


                                        





Voiding Method                   Condom Catheter











Active Medications





- Current Medications


Current Medications: 














Generic Name Dose Route Start Last Admin





  Trade Name Freq  PRN Reason Stop Dose Admin


 


Acetaminophen  650 mg  08/09/21 00:17 





  Acetaminophen 325 Mg Tab  PO  





  Q4H PRN  





  Pain MILD(1-3)/Fever >100.5/HA  


 


Amiodarone HCl  200 mg  08/11/21 10:00  08/15/21 10:32





  Amiodarone 200 Mg Tab  PO   200 mg





  QDAY JORDANA   Administration


 


Apixaban  5 mg  08/09/21 10:00  08/15/21 10:32





  Apixaban 5 Mg Tab  PO   5 mg





  Q48HR JORDANA   Administration


 


Dextrose  50 ml  08/10/21 12:24 





  Dextrose 50% In Water (25gm) 50 Ml Syringe  IV  





  Q30MIN PRN  





  Hypoglycemia  





  Protocol  


 


Folic Acid  1 mg  08/11/21 10:00  08/15/21 10:32





  Folic Acid 1 Mg Tab  PO   1 mg





  QDAY JORDANA   Administration


 


Haloperidol Lactate  5 mg  08/09/21 00:17  08/14/21 23:28





  Haloperidol Lactate 5 Mg/1 Ml Inj  IV   5 mg





  Q1H PRN   Administration





  Unrespon. to mult. doses BZD's  


 


Hydralazine HCl  10 mg  08/10/21 10:05 





  Hydralazine 20 Mg/1 Ml Inj  IV  





  Q4HR PRN  





  Hypertension  


 


Levetiracetam  750 mg  08/10/21 22:00  08/15/21 10:32





  Levetiracetam 500 Mg Tab  PO   750 mg





  BID JORDANA   Administration


 


Lorazepam  2 mg  08/09/21 00:17  08/13/21 04:46





  Lorazepam 2 Mg/Ml Vial  IV   2 mg





  Q1H PRN   Administration





  CIWA-Ar 8-15  


 


Metoclopramide HCl  5 mg  08/09/21 12:00  08/15/21 06:54





  Metoclopramide 10 Mg/2 Ml Inj  IV   5 mg





  Q6H JORDANA   Administration


 


Metoprolol Tartrate  50 mg  08/10/21 22:00  08/15/21 10:32





  Metoprolol Tartrate 50 Mg Tab  PO   50 mg





  BID JORDANA   Administration


 


Morphine Sulfate  2 mg  08/09/21 12:00  08/15/21 06:54





  Morphine 2 Mg/1 Ml Inj  IV   2 mg





  Q4H PRN   Administration





  Pain , Severe (7-10)  


 


Naloxone HCl  0.1 mg  08/09/21 00:17 





  Naloxone 0.4 Mg/1 Ml Inj  IV  





  Q2MIN PRN  





  Res Rate </= 8 or 02 SAT < 92%  


 


Ondansetron HCl  4 mg  08/09/21 00:17  08/11/21 10:09





  Ondansetron 4 Mg/2 Ml Inj  IV   4 mg





  Q8H PRN   Administration





  Nausea And Vomiting  


 


Pantoprazole Sodium  40 mg  08/13/21 16:30  08/15/21 06:51





  Pantoprazole 40 Mg Tab  PO   40 mg





  BIDAC JORDANA   Administration


 


Potassium Chloride  40 meq  08/13/21 10:00  08/15/21 10:32





  Potassium Chloride Er 20 Meq Tab  PO   40 meq





  QDAY JORDANA   Administration


 


Potassium Phos/Sodium Phos  1 each  08/11/21 14:00  08/15/21 06:51





  Phos-Nak Powder Packet  PO   1 each





  Q8HR JORDANA   Administration


 


Scopolamine  1 each  08/09/21 13:00  08/15/21 10:33





  Scopolamine Transdermal Patch 72 Hr  TD   1 each





  Q3D JORDANA   Administration


 


Sodium Chloride  10 ml  08/09/21 10:00  08/15/21 10:34





  Sodium Chloride 0.9% 10 Ml Flush Syringe  IV   10 ml





  BID JORDANA   Administration


 


Sodium Chloride  10 ml  08/09/21 00:17  08/11/21 18:56





  Sodium Chloride 0.9% 10 Ml Flush Syringe  IV   10 ml





  PRN PRN   Administration





  LINE FLUSH  


 


Sucralfate  1 gm  08/09/21 18:00  08/15/21 06:51





  Sucralfate 1 Gm/10 Ml Oral Liqd  PO   1 gm





  Q6HR JORDANA   Administration

## 2021-08-15 NOTE — XRAY REPORT
CHEST 1 VIEW 8/15/2021 4:54 PM



INDICATION / CLINICAL INFORMATION: increased temp.



COMPARISON: 4/3/2021



FINDINGS:



SUPPORT DEVICES: Stable AICD

HEART / MEDIASTINUM: No significant abnormality. 

LUNGS / PLEURA: No significant pulmonary or pleural abnormality. No pneumothorax. 



ADDITIONAL FINDINGS: No significant additional findings.



IMPRESSION:

1. No acute findings.



Signer Name: Gen Reyes MD 

Signed: 8/15/2021 5:49 PM

Workstation Name: [id number]

## 2021-08-15 NOTE — PROGRESS NOTE
Assessment and Plan





1.  Chronic combined systolic and diastolic heart failure


2.  Dilated nonischemic cardiomyopathy left ventricular ejection fraction 35 to 

40%


3.  Permanent atrial fibrillation


4.  Essential hypertension


5.  Type 2 diabetes mellitus


6.  Alcohol abuse/alcohol withdrawal syndrome


7.  Gastroesophageal reflux disease with Oliavrez's esophagitis





Plan.


Cardiac wise stable will continue present conservative cardiac management with 

rate control of atrial fibrillation.  And afterload reduction of the left 

ventricle.





Subjective


Date of service: 08/15/21


Interval history: 





Altered mental status. 





Objective


                                   Vital Signs











  Temp Pulse Resp BP Pulse Ox


 


 08/15/21 08:03   114 H    100


 


 08/15/21 04:46  99.0 F  114 H  18  123/75  98


 


 08/15/21 00:54  98.0 F  124 H  18  120/80  99


 


 08/15/21 00:00   98 H   


 


 08/14/21 23:11   97 H   113/68 


 


 08/14/21 21:11  100.0 F H  97 H  18  113/68  100


 


 08/14/21 19:53      99


 


 08/14/21 12:54  98.0 F  90  18  109/71  99


 


 08/14/21 10:53   102 H   116/84 














- Physical Examination


General: No Apparent Distress


HEENT: Positive: PERRL


Neck: Positive: neck supple


Cardiac: Positive: irregularly irregular, S1/S2, PMI, Laterally Displaced.  

Negative: S3, S4


Lungs: Positive: clear to auscultation, No Wheeze, Rales, Rhonchi


Neuro: Positive: Weakness


Abdomen: Positive: Unremarkable, Soft


Skin: Positive: Clear


Extremities: Absent: edema

## 2021-08-16 LAB
ALBUMIN SERPL-MCNC: 2.8 G/DL (ref 3.9–5)
ALT SERPL-CCNC: 23 UNITS/L (ref 7–56)
ANISOCYTOSIS BLD QL SMEAR: (no result)
BAND NEUTROPHILS # (MANUAL): 0.1 K/MM3
BUN SERPL-MCNC: 16 MG/DL (ref 9–20)
BUN/CREAT SERPL: 15 %
CALCIUM SERPL-MCNC: 9 MG/DL (ref 8.4–10.2)
HCT VFR BLD CALC: 25.2 % (ref 35.5–45.6)
HEMOLYSIS INDEX: 4
HGB BLD-MCNC: 8.5 GM/DL (ref 11.8–15.2)
MCHC RBC AUTO-ENTMCNC: 34 % (ref 32–34)
MCV RBC AUTO: 86 FL (ref 84–94)
MYELOCYTES # (MANUAL): 0 K/MM3
PLATELET # BLD: 225 K/MM3 (ref 140–440)
PROMYELOCYTES # (MANUAL): 0 K/MM3
RBC # BLD AUTO: 2.92 M/MM3 (ref 3.65–5.03)
TOTAL CELLS COUNTED BLD: 100

## 2021-08-16 RX ADMIN — METOCLOPRAMIDE SCH MG: 5 INJECTION, SOLUTION INTRAMUSCULAR; INTRAVENOUS at 00:12

## 2021-08-16 RX ADMIN — METOCLOPRAMIDE SCH MG: 5 INJECTION, SOLUTION INTRAMUSCULAR; INTRAVENOUS at 05:36

## 2021-08-16 RX ADMIN — METOPROLOL TARTRATE SCH: 50 TABLET, FILM COATED ORAL at 21:28

## 2021-08-16 RX ADMIN — PANTOPRAZOLE SODIUM SCH MG: 40 TABLET, DELAYED RELEASE ORAL at 09:21

## 2021-08-16 RX ADMIN — METOCLOPRAMIDE SCH MG: 5 INJECTION, SOLUTION INTRAMUSCULAR; INTRAVENOUS at 12:22

## 2021-08-16 RX ADMIN — SUCRALFATE ORAL SCH GM: 1 SUSPENSION ORAL at 12:22

## 2021-08-16 RX ADMIN — SUCRALFATE ORAL SCH GM: 1 SUSPENSION ORAL at 05:36

## 2021-08-16 RX ADMIN — POTASSIUM & SODIUM PHOSPHATES POWDER PACK 280-160-250 MG SCH EACH: 280-160-250 PACK at 05:36

## 2021-08-16 RX ADMIN — FOLIC ACID SCH MG: 1 TABLET ORAL at 09:21

## 2021-08-16 RX ADMIN — PANTOPRAZOLE SODIUM SCH MG: 40 TABLET, DELAYED RELEASE ORAL at 16:51

## 2021-08-16 RX ADMIN — AMIODARONE HYDROCHLORIDE SCH MG: 200 TABLET ORAL at 09:20

## 2021-08-16 RX ADMIN — SUCRALFATE ORAL SCH GM: 1 SUSPENSION ORAL at 00:11

## 2021-08-16 RX ADMIN — Medication SCH ML: at 09:22

## 2021-08-16 RX ADMIN — POTASSIUM & SODIUM PHOSPHATES POWDER PACK 280-160-250 MG SCH EACH: 280-160-250 PACK at 14:09

## 2021-08-16 RX ADMIN — POTASSIUM & SODIUM PHOSPHATES POWDER PACK 280-160-250 MG SCH: 280-160-250 PACK at 21:28

## 2021-08-16 RX ADMIN — METOCLOPRAMIDE SCH MG: 5 INJECTION, SOLUTION INTRAMUSCULAR; INTRAVENOUS at 17:09

## 2021-08-16 RX ADMIN — ACETAMINOPHEN PRN MG: 325 TABLET ORAL at 09:20

## 2021-08-16 RX ADMIN — METOPROLOL TARTRATE SCH MG: 50 TABLET, FILM COATED ORAL at 09:19

## 2021-08-16 RX ADMIN — SUCRALFATE ORAL SCH GM: 1 SUSPENSION ORAL at 17:09

## 2021-08-16 RX ADMIN — Medication SCH: at 21:29

## 2021-08-16 RX ADMIN — POTASSIUM CHLORIDE SCH MEQ: 1500 TABLET, EXTENDED RELEASE ORAL at 09:21

## 2021-08-16 NOTE — PROGRESS NOTE
Assessment and Plan





- Patient Problems


(1) Rapid atrial fibrillation


Current Visit: Yes   Status: Acute   


Plan to address problem: 


Continue medical therapy for paroxysmal atrial fibrillation.  Continue chronic 

oral anticoagulation.








(2) Nonischemic cardiomyopathy


Current Visit: Yes   Status: Acute   


Plan to address problem: 


Guideline directed medical therapy for chronic left ventricular systolic 

failure.








(3) Alcohol abuse


Current Visit: Yes   Status: Acute   





Subjective


Date of service: 08/16/21


Interval history: 





Patient is comfortable, no cardiac complaints, looks and feels better.





Objective


                                   Vital Signs











  Temp Pulse Pulse Pulse Resp Resp Resp


 


 08/16/21 12:19  99.4 F  91 H    18  


 


 08/16/21 09:20      17  


 


 08/16/21 09:19   101 H     


 


 08/16/21 08:20  99.7 F H  102 H    18  


 


 08/16/21 08:00   97 H  97 H  97 H  17  


 


 08/16/21 04:38  99.0 F  103 H    18  


 


 08/16/21 00:00   99 H     


 


 08/15/21 23:48  98.0 F  91 H    18  


 


 08/15/21 21:23      20  


 


 08/15/21 21:22   107 H     


 


 08/15/21 20:00       20  20


 


 08/15/21 19:30  98.0 F  107 H    18  


 


 08/15/21 16:50  102.1 F H  120 H    18  


 


 08/15/21 16:41   102 H     














  BP Pulse Ox


 


 08/16/21 12:19  125/80  99


 


 08/16/21 09:20  


 


 08/16/21 09:19  108/71 


 


 08/16/21 08:20  105/64  96


 


 08/16/21 08:00   100


 


 08/16/21 04:38  113/72  97


 


 08/16/21 00:00  


 


 08/15/21 23:48  128/83  98


 


 08/15/21 21:23  


 


 08/15/21 21:22  128/62 


 


 08/15/21 20:00   98


 


 08/15/21 19:30  128/62  98


 


 08/15/21 16:50  119/66  98


 


 08/15/21 16:41  














- Physical Examination


General: No Apparent Distress


HEENT: Positive: PERRL


Neck: Positive: neck supple


Cardiac: Positive: irregularly irregular


Lungs: Positive: Decreased Breath Sounds


Neuro: Positive: Weakness


Abdomen: Positive: Unremarkable, Soft


Skin: Positive: Clear


Extremities: Absent: edema





- Labs and Meds


                                 Cardiac Enzymes











  08/16/21 Range/Units





  11:36 


 


AST  36  (5-40)  units/L








                                       CBC











  08/16/21 Range/Units





  11:36 


 


WBC  6.3  (4.5-11.0)  K/mm3


 


RBC  2.92 L  (3.65-5.03)  M/mm3


 


Hgb  8.5 L  (11.8-15.2)  gm/dl


 


Hct  25.2 L  (35.5-45.6)  %


 


Plt Count  225  (140-440)  K/mm3








                          Comprehensive Metabolic Panel











  08/16/21 Range/Units





  11:36 


 


Sodium  129 L  (137-145)  mmol/L


 


Potassium  4.5  (3.6-5.0)  mmol/L


 


Chloride  94.6 L  ()  mmol/L


 


Carbon Dioxide  22  (22-30)  mmol/L


 


BUN  16  (9-20)  mg/dL


 


Creatinine  1.1  (0.8-1.3)  mg/dL


 


Glucose  103 H  ()  mg/dL


 


Calcium  9.0  (8.4-10.2)  mg/dL


 


AST  36  (5-40)  units/L


 


ALT  23  (7-56)  units/L


 


Alkaline Phosphatase  94  ()  units/L


 


Total Protein  6.2 L  (6.3-8.2)  g/dL


 


Albumin  2.8 L  (3.9-5)  g/dL

## 2021-08-16 NOTE — DISCHARGE SUMMARY
Providers





- Providers


Date of Admission: 


08/11/21 13:12





Date of discharge: 08/16/21


Attending physician: 


DEBRA PAK MD





                                        





08/08/21 23:28


Consult to Physician [CONS] Stat 


   Comment: ANTONI Arana spoke with Dr. Quintero @ [id number]


   Consulting Provider: SERGEY QUINTERO


   Physician Instructions: 


   Reason For Exam: new onset renal failure





08/09/21 00:53


Consult to Physician [CONS] Routine 


   Comment: 


   Consulting Provider: JONATHAN RAMOS


   Physician Instructions: 


   Reason For Exam: hiatal hernia





08/09/21 09:31


Consult to Physician [CONS] Routine 


   Comment: 


   Consulting Provider: FELIZ WONG


   Physician Instructions: 


   Reason For Exam: abd pain





08/09/21 16:38


Consult to Cardiology [CONS] Routine 


   Consulting Provider: MARÍA BAKER


   Reason For Exam: A-fib with RVR





08/14/21 13:01


Physical Therapy Evaluation and Treat [CONS] Routine 


   Comment: 


   Reason For Exam: eval and treat, weakness





08/16/21 12:44


Consult to Case Management [CONS] Routine 


   Services Needed at Discharge: Home Health Services


   Additional Physician Instructions: resume home health care pt.











Primary care physician: 


PRIMARY CARE MD








Hospitalization


Reason for admission: abdominal pain


Condition: Fair


Hospital course: 





This is 60-year-old male with HTN, DM, chronic EtOH abuse, p Olivarez's 

esophagitis, hiatal hernia, seizure disorder, A. fib, HFr EF, cardiomegaly s/p 

dual chamber AICD admitted with acute kidney injury, elevated LFT, hypokalemia, 

hypomagnesemia, A. fib with RVR





Neuro: h/o seizure disorder, h/o EtOH abuse, alcoholic withdrawal


-CIWA protocol, prn ativan


-Reorientation as needed


-Aspiration/fall/seizure precautions


-Thiamine, folic acid


-As needed Haldol


-8/11: Ordered restraints due to agitation and patient wandering today on floor.

 Hallucination, fluctuating orientation on exam.


8/14: Mental status significantly improved, appears to have made it through 

worst of withdrawal.  Restraints discontinued.





Cardio: A. fib RVR, HTN, A. fib, nonischemic cardiomegaly s/p dual-chamber AICD,

 HFrEF


-Cardiology consulted, appreciate recommendations


-3/2017 cardiac cath demonstrated normal coronary arteries with a ventricular 

ejection fraction of 20 to 25%


-5/2021 echocardiogram shows EF of 35 to 40%


-Blood pressure monitoring per protocol


-Amiodarone 200 mg daily, amlodipine, metoprolol


-Continue home Eliquis for anticoagulation





Respiratory: NAD


-Supplemental oxygen as needed


-Pulmonary hygiene


-SPO2 monitoring





GI: Acute on chronic abdominal pain, elevated LFTs, h/o GERD, esophagitis, 

hiatal hernia, long segment Olivarez's esophagus


-GI consulted, appreciate recommendations


-5/2020 MRCP-see GI notes for details


-6/24/2021 EGD at OSH-see GI notes for details


-PPI


-Carafate 3 times daily


-As needed Reglan


-As needed zofran


-Consistent carbohydrate cardiac renal diet





: Acute kidney injury, hypokalemia, hypomagnesemia, metabolic alkalosis, 

hyponatremia


-Nephrology consulted, appreciate recommendations


-MIVF per nephrology


-Renal ultrasound within normal limits-see results


-Repeat electrolytes aggressively


-Avoid nephrotoxic medications


-Renally dose medications


-History continue nephro


-Daily weights





Endo: NAD


-Patient has a history of " diabetes mellitus" per charting


-Hemoglobin A1c 4.8


-Patient was on Accu-Cheks every 6 while n.p.o.


-Avoid hypoglycemia





ID: NAD


-Monitor for signs or symptoms of infection


-Patient has been afebrile





Heme: NAD


-Trend CBC


-Transfuse to hemoglobin less than 7


-Patient is on Eliquis for A. fib


-PPI


-SCDs to bilateral lower extremities while in bed





8/11/2021: Patient very nauseous this morning on a.m. encounter.  Told nurse to 

give additional Zofran to patient.  He can be continued on Protonix and 

sucralfate as suggested by GI.  Discussed patient case with cardiology.  They 

recommended continued amiodarone 200 mg daily and metoprolol for atrial 

fibrillation.  In afternoon, was paged about patient agitation and 

disorientation.  Patient was seen walking in the hallways and stated that he 

wanted to go home.  RN redirected patient back to bed,  restraints were 

requested and ordered.  Patient is on CIWA protocol with as needed Ativan on 

board.  





8/12/2021: patient remains disoriented and appears tremulous. Restraints 

reordered as patient attempts to leave bed multiple times. Continuing with CIWA 

protocol. 





8/13/2021: Patient remains disoriented.  Required doses of Ativan due to 

agitation.  Will need restraints.  Will reassess through the weekend as patient 

continues to have withdrawal





8/14/2021: Patient today alert and oriented x3.  Was initially upset that no one

 had seen him last few days.  However patient was reassured that he was in fact 

seen and he was actively withdrawing from alcohol.  He did not recall the events

 of the last 2 days.  We discussed the need for abstaining from using alcoholic 

substances or any recreational substances for that matter.  This morning he 

denied any nausea vomiting, tremulousness.  We will consult physical therapy 

today as patient will likely be able to participate now.  Restraints are now 

discontinued.





8/15/2021: Still nausaeous at times. However, AOx3. Mentation significantly 

improved. Will follow up on PT evaluation. Patient also had temp of 102F. 

Ordered repeat Bcx, UCx, cxr. 





8/16/2021: NO temperature today. Doubt infection. No wbc count, no infiltrate on

 cxr. Patient feels well on encounter.  per physical therapy, patient can resume

 his home health care already in place with [facility] with no modifications to his

 care. CM will set up [facility].  He will be discharged home with instructions to 

follow up with his primary care physician in 3-5 days.  He will need to follow-

up with cardiology in 1 week.


Disposition: 06 HOME HEALTH CARE SERVICE


Final Discharge Diagnosis (Prints w/discharge instructions): Alcoholic 

Withdrawal


Time spent for discharge: 35





- Discharge Diagnoses


(1) Hx of seizure disorder


Status: Acute   





(2) Hypomagnesemia


Status: Acute   





(3) HERMELINDA (acute kidney injury)


Status: Acute   





(4) Alcohol withdrawal


Status: Acute   





(5) Atrial fibrillation with rapid ventricular response


Status: Acute   





(6) Seizure


Status: Acute   





(7) Dilated cardiomyopathy


Status: Chronic   





(8) GERD (gastroesophageal reflux disease)


Status: Chronic   





(9) Hypertension


Status: Chronic   





(10) Neuropathy


Status: Chronic   





Core Measure Documentation





- Palliative Care


Palliative Care/ Comfort Measures: Not Applicable





- Core Measures


Any of the following diagnoses?: none





Exam





- Physical Exam


Narrative exam: 





Physical Exam:


Constitutional: Alert, no acute distress on encounter.


Head, Ears, Nose: Normocephalic, atraumatic. External ears, nose normal


Eyes: Conjunctivae/corneas clear. No icterus. No ptosis.


Neck: Supple, no meningeal signs


Oral: dentition fair, no thrush


Cardiovascular: S1, S2 normal. 


Respiratory: Good air entry, clear to auscultation bilaterally


GI: Soft,tender to palpation; bowel sounds normal. No peritoneal signs. 


Musculoskeletal: No pedal edema, no cyanosis.


Skin: No rash or abscess


Hem/Lymphatic: No palpable cervical or supraclavicular nodes. No lymphangitis


Psych: Normal thought process


Neurological: Alert and oriented x4.





- Constitutional


Vitals: 


                                        











Temp Pulse Resp BP Pulse Ox


 


 99.4 F   91 H  18   125/80   99 


 


 08/16/21 12:19  08/16/21 12:19  08/16/21 12:19  08/16/21 12:19  08/16/21 12:19














Plan


Activity: fall precautions


Weight Bearing Status: Weight Bear as Tolerated


Diet: low cholesterol, low salt


Follow up with: 


PRIMARY CARE,MD [Primary Care Provider] - 3-5 Days


Prescriptions: 


Sucralfate [Carafate] 1 gm PO Q6HR 30 Days #1 bottle


Amiodarone [Cordarone 200 MG TAB] 200 mg PO QDAY 30 Days #30 tablet


Folic Acid [Folvite] 1 mg PO QDAY 30 Days #30 tablet


Metoprolol [Lopressor TAB] 50 mg PO BID 30 Days #60 tablet


Pantoprazole [Protonix TAB] 40 mg PO BIDAC 30 Days #60 tablet


Apixaban [Eliquis] 5 mg PO Q48HR 30 Days #15

## 2021-08-17 VITALS — DIASTOLIC BLOOD PRESSURE: 84 MMHG | SYSTOLIC BLOOD PRESSURE: 138 MMHG

## 2021-08-17 RX ADMIN — SUCRALFATE ORAL SCH: 1 SUSPENSION ORAL at 01:19

## 2021-08-17 RX ADMIN — POTASSIUM & SODIUM PHOSPHATES POWDER PACK 280-160-250 MG SCH: 280-160-250 PACK at 07:05

## 2021-08-17 RX ADMIN — AMIODARONE HYDROCHLORIDE SCH MG: 200 TABLET ORAL at 10:49

## 2021-08-17 RX ADMIN — METOPROLOL TARTRATE SCH MG: 50 TABLET, FILM COATED ORAL at 10:49

## 2021-08-17 RX ADMIN — METOCLOPRAMIDE SCH: 5 INJECTION, SOLUTION INTRAMUSCULAR; INTRAVENOUS at 01:19

## 2021-08-17 RX ADMIN — SUCRALFATE ORAL SCH: 1 SUSPENSION ORAL at 07:05

## 2021-08-17 RX ADMIN — FOLIC ACID SCH MG: 1 TABLET ORAL at 10:49

## 2021-08-17 RX ADMIN — METOCLOPRAMIDE SCH: 5 INJECTION, SOLUTION INTRAMUSCULAR; INTRAVENOUS at 07:05

## 2021-08-17 RX ADMIN — Medication SCH ML: at 10:49

## 2021-08-17 RX ADMIN — POTASSIUM CHLORIDE SCH MEQ: 1500 TABLET, EXTENDED RELEASE ORAL at 10:49

## 2021-08-17 RX ADMIN — APIXABAN SCH MG: 5 TABLET, FILM COATED ORAL at 10:49

## 2021-08-17 NOTE — PROGRESS NOTE
Hospitalist Physical





- Constitutional


Vitals: 


                                        











Temp Pulse Resp BP Pulse Ox


 


 98.0 F   18 L  18   138/84   98 


 


 08/17/21 08:03  08/17/21 04:32  08/17/21 08:03  08/17/21 08:03  08/16/21 20:00











General appearance: Present: no acute distress





Results





- Labs


CBC & Chem 7: 


                                 08/16/21 11:36





                                 08/16/21 11:36


Labs: 


                             Laboratory Last Values











WBC  6.3 K/mm3 (4.5-11.0)   08/16/21  11:36    


 


RBC  2.92 M/mm3 (3.65-5.03)  L  08/16/21  11:36    


 


Hgb  8.5 gm/dl (11.8-15.2)  L  08/16/21  11:36    


 


Hct  25.2 % (35.5-45.6)  L  08/16/21  11:36    


 


MCV  86 fl (84-94)   08/16/21  11:36    


 


MCH  29 pg (28-32)   08/16/21  11:36    


 


MCHC  34 % (32-34)   08/16/21  11:36    


 


RDW  20.7 % (13.2-15.2)  H  08/16/21  11:36    


 


Plt Count  225 K/mm3 (140-440)   08/16/21  11:36    


 


Lymph % (Auto)  16.1 % (13.4-35.0)   08/10/21  04:03    


 


Mono % (Auto)  Np   08/16/21  11:36    


 


Eos % (Auto)  Np   08/14/21  07:30    


 


Baso % (Auto)  0.2 % (0.0-1.8)   08/10/21  04:03    


 


Lymph # (Auto)  0.8 K/mm3 (1.2-5.4)  L  08/10/21  04:03    


 


Mono # (Auto)  1.0 K/mm3 (0.0-0.8)  H  08/12/21  05:02    


 


Eos # (Auto)  0.0 K/mm3 (0.0-0.4)   08/10/21  04:03    


 


Baso # (Auto)  0.0 K/mm3 (0.0-0.1)   08/10/21  04:03    


 


Add Manual Diff  Complete   08/16/21  11:36    


 


Total Counted  100   08/16/21  11:36    


 


Seg Neutrophils %  Np   08/14/21  07:30    


 


Seg Neuts % (Manual)  61.0 % (40.0-70.0)   08/16/21  11:36    


 


Band Neutrophils %  1.0 %  08/16/21  11:36    


 


Lymphocytes % (Manual)  25.0 % (13.4-35.0)   08/16/21  11:36    


 


Monocytes % (Manual)  13.0 % (0.0-7.3)  H  08/16/21  11:36    


 


Eosinophils % (Manual)  1.0 % (0.0-4.3)   08/14/21  07:30    


 


Basophils % (Manual)  1.0 % (0.0-1.8)   08/14/21  07:30    


 


Nucleated RBC %  Not Reportable   08/16/21  11:36    


 


Seg Neutrophils #  3.3 K/mm3 (1.8-7.7)   08/10/21  04:03    


 


Seg Neutrophils # Man  3.8 K/mm3 (1.8-7.7)   08/16/21  11:36    


 


Band Neutrophils #  0.1 K/mm3  08/16/21  11:36    


 


Lymphocytes # (Manual)  1.6 K/mm3 (1.2-5.4)   08/16/21  11:36    


 


Abs React Lymphs (Man)  0.0 K/mm3  08/16/21  11:36    


 


Monocytes # (Manual)  0.8 K/mm3 (0.0-0.8)   08/16/21  11:36    


 


Eosinophils # (Manual)  0.0 K/mm3 (0.0-0.4)   08/16/21  11:36    


 


Basophils # (Manual)  0.0 K/mm3 (0.0-0.1)   08/16/21  11:36    


 


Metamyelocytes #  0.0 K/mm3  08/16/21  11:36    


 


Myelocytes #  0.0 K/mm3  08/16/21  11:36    


 


Promyelocytes #  0.0 K/mm3  08/16/21  11:36    


 


Blast Cells #  0.0 K/mm3  08/16/21  11:36    


 


WBC Morphology  Not Reportable   08/16/21  11:36    


 


Hypersegmented Neuts  Not Reportable   08/16/21  11:36    


 


Hyposegmented Neuts  Not Reportable   08/16/21  11:36    


 


Hypogranular Neuts  Not Reportable   08/16/21  11:36    


 


Smudge Cells  Not Reportable   08/16/21  11:36    


 


Toxic Granulation  Not Reportable   08/16/21  11:36    


 


Toxic Vacuolation  Not Reportable   08/16/21  11:36    


 


Dohle Bodies  Not Reportable   08/16/21  11:36    


 


Pelger-Huet Anomaly  Not Reportable   08/16/21  11:36    


 


Peyton Rods  Not Reportable   08/16/21  11:36    


 


Platelet Estimate  Consistent w auto   08/16/21  11:36    


 


Clumped Platelets  Not Reportable   08/16/21  11:36    


 


Plt Clumps, EDTA  Not Reportable   08/16/21  11:36    


 


Large Platelets  Not Reportable   08/16/21  11:36    


 


Giant Platelets  Not Reportable   08/16/21  11:36    


 


Platelet Satelliting  Not Reportable   08/16/21  11:36    


 


Plt Morphology Comment  Not Reportable   08/16/21  11:36    


 


RBC Morphology  Not Reportable   08/16/21  11:36    


 


Dimorphic RBCs  Not Reportable   08/16/21  11:36    


 


Polychromasia  Not Reportable   08/16/21  11:36    


 


Hypochromasia  Not Reportable   08/16/21  11:36    


 


Poikilocytosis  Not Reportable   08/16/21  11:36    


 


Anisocytosis  1+   08/16/21  11:36    


 


Microcytosis  Not Reportable   08/16/21  11:36    


 


Macrocytosis  Not Reportable   08/16/21  11:36    


 


Spherocytes  Not Reportable   08/16/21  11:36    


 


Pappenheimer Bodies  Not Reportable   08/16/21  11:36    


 


Sickle Cells  Not Reportable   08/16/21  11:36    


 


Target Cells  Not Reportable   08/16/21  11:36    


 


Tear Drop Cells  Not Reportable   08/16/21  11:36    


 


Ovalocytes  Not Reportable   08/16/21  11:36    


 


Helmet Cells  Not Reportable   08/16/21  11:36    


 


Aguirre-Rosiclare Bodies  Not Reportable   08/16/21  11:36    


 


Cabot Rings  Not Reportable   08/16/21  11:36    


 


Danette Cells  Not Reportable   08/16/21  11:36    


 


Bite Cells  Not Reportable   08/16/21  11:36    


 


Crenated Cell  Not Reportable   08/16/21  11:36    


 


Elliptocytes  Not Reportable   08/16/21  11:36    


 


Acanthocytes (Spur)  Not Reportable   08/16/21  11:36    


 


Rouleaux  Not Reportable   08/16/21  11:36    


 


Hemoglobin C Crystals  Not Reportable   08/16/21  11:36    


 


Schistocytes  Not Reportable   08/16/21  11:36    


 


Malaria parasites  Not Reportable   08/16/21  11:36    


 


George Bodies  Not Reportable   08/16/21  11:36    


 


Hem Pathologist Commnt  No   08/16/21  11:36    


 


PT  13.9 Sec. (12.2-14.9)   08/08/21  21:02    


 


INR  1.02  (0.87-1.13)   08/08/21  21:02    


 


APTT  28.6 Sec. (24.2-36.6)   08/08/21  21:02    


 


Sodium  129 mmol/L (137-145)  L  08/16/21  11:36    


 


Potassium  4.5 mmol/L (3.6-5.0)   08/16/21  11:36    


 


Chloride  94.6 mmol/L ()  L  08/16/21  11:36    


 


Carbon Dioxide  22 mmol/L (22-30)   08/16/21  11:36    


 


Anion Gap  17 mmol/L  08/16/21  11:36    


 


BUN  16 mg/dL (9-20)   08/16/21  11:36    


 


Creatinine  1.1 mg/dL (0.8-1.3)   08/16/21  11:36    


 


Estimated GFR  > 60 ml/min  08/16/21  11:36    


 


BUN/Creatinine Ratio  15 %  08/16/21  11:36    


 


Glucose  103 mg/dL ()  H  08/16/21  11:36    


 


Hemoglobin A1c  4.8 % (4-6)   08/10/21  04:03    


 


Calcium  9.0 mg/dL (8.4-10.2)   08/16/21  11:36    


 


Phosphorus  3.00 mg/dL (2.5-4.5)   08/14/21  07:30    


 


Magnesium  1.20 mg/dL (1.7-2.3)  L  08/15/21  04:35    


 


Total Bilirubin  0.60 mg/dL (0.1-1.2)   08/16/21  11:36    


 


Direct Bilirubin  0.6 mg/dL (0-0.2)  H  08/10/21  04:03    


 


Indirect Bilirubin  0.8 mg/dL  08/10/21  04:03    


 


AST  36 units/L (5-40)   08/16/21  11:36    


 


ALT  23 units/L (7-56)   08/16/21  11:36    


 


Alkaline Phosphatase  94 units/L ()   08/16/21  11:36    


 


Total Protein  6.2 g/dL (6.3-8.2)  L  08/16/21  11:36    


 


Albumin  2.8 g/dL (3.9-5)  L  08/16/21  11:36    


 


Albumin/Globulin Ratio  0.8 %  08/16/21  11:36    


 


Lipase  20 units/L (13-60)   08/08/21  19:52    


 


Nasal Screen MRSA (PCR)  Negative  (Negative)   08/11/21  15:46    


 


Plasma/Serum Alcohol  0.18 % (0-0.07)  H  08/08/21  21:02    











Microbiology: 


Microbiology





08/16/21 00:01   Peripheral/Venous   Blood Culture - Preliminary


                            NO GROWTH AFTER 24 HOURS


08/16/21 00:01   Peripheral/Venous   Blood Culture - Preliminary


                            NO GROWTH AFTER 24 HOURS








[place]/IV: 


                                        





Voiding Method                   Incontinent

## 2021-08-17 NOTE — DISCHARGE SUMMARY
Providers





- Providers


Date of Admission: 


08/11/21 13:12





Date of discharge: 08/17/21


Attending physician: 


AMY J KOCHERLA





                                        





08/08/21 23:28


Consult to Physician [CONS] Stat 


   Comment: ANTONI Arana spoke with Dr. Quintero @ [phone]


   Consulting Provider: SERGEY QUINTERO


   Physician Instructions: 


   Reason For Exam: new onset renal failure





08/09/21 00:53


Consult to Physician [CONS] Routine 


   Comment: 


   Consulting Provider: JONATHAN RAMOS


   Physician Instructions: 


   Reason For Exam: hiatal hernia





08/09/21 09:31


Consult to Physician [CONS] Routine 


   Comment: 


   Consulting Provider: FELIZ WONG


   Physician Instructions: 


   Reason For Exam: abd pain





08/09/21 16:38


Consult to Cardiology [CONS] Routine 


   Consulting Provider: MARÍA BAKER


   Reason For Exam: A-fib with RVR





08/14/21 13:01


Physical Therapy Evaluation and Treat [CONS] Routine 


   Comment: 


   Reason For Exam: eval and treat, weakness





08/16/21 12:44


Consult to Case Management [CONS] Routine 


   Services Needed at Discharge: Home Health Services


   Notified:: case management


   Additional Physician Instructions: resume home health care pt.











Primary care physician: 


PRIMARY CARE MD








Hospitalization


Condition: Fair


Hospital course: 


This is 60-year-old male with HTN, DM, chronic EtOH abuse, p Olivarez's 

esophagitis, hiatal hernia, seizure disorder, A. fib, HFr EF, cardiomegaly s/p 

dual chamber AICD admitted with acute kidney injury, elevated LFT, hypokalemia, 

hypomagnesemia, A. fib with RVR





Neuro: h/o seizure disorder, h/o EtOH abuse, alcoholic withdrawal


-CIWA protocol, prn ativan


-Reorientation as needed


-Aspiration/fall/seizure precautions


-Thiamine, folic acid


-As needed Haldol


-8/11: Ordered restraints due to agitation and patient wandering today on floor.

 Hallucination, fluctuating orientation on exam.


8/14: Mental status significantly improved, appears to have made it through 

worst of withdrawal.  Restraints discontinued.





Cardio: A. fib RVR, HTN, A. fib, nonischemic cardiomegaly s/p dual-chamber AICD,

 HFrEF


-Cardiology consulted, appreciate recommendations


-3/2017 cardiac cath demonstrated normal coronary arteries with a ventricular 

ejection fraction of 20 to 25%


-5/2021 echocardiogram shows EF of 35 to 40%


-Blood pressure monitoring per protocol


-Amiodarone 200 mg daily, amlodipine, metoprolol


-Continue home Eliquis for anticoagulation





Respiratory: NAD


-Supplemental oxygen as needed


-Pulmonary hygiene


-SPO2 monitoring





GI: Acute on chronic abdominal pain, elevated LFTs, h/o GERD, esophagitis, 

hiatal hernia, long segment Olivarez's esophagus


-GI consulted, appreciate recommendations


-5/2020 MRCP-see GI notes for details


-6/24/2021 EGD at OSH-see GI notes for details


-PPI


-Carafate 3 times daily


-As needed Reglan


-As needed zofran


-Consistent carbohydrate cardiac renal diet





: Acute kidney injury, hypokalemia, hypomagnesemia, metabolic alkalosis, 

hyponatremia


-Nephrology consulted, appreciate recommendations


-MIVF per nephrology


-Renal ultrasound within normal limits-see results


-Repeat electrolytes aggressively


-Avoid nephrotoxic medications


-Renally dose medications


-History continue nephro


-Daily weights





Endo: NAD


-Patient has a history of " diabetes mellitus" per charting


-Hemoglobin A1c 4.8


-Patient was on Accu-Cheks every 6 while n.p.o.


-Avoid hypoglycemia





ID: NAD


-Monitor for signs or symptoms of infection


-Patient has been afebrile





Heme: NAD


-Trend CBC


-Transfuse to hemoglobin less than 7


-Patient is on Eliquis for A. fib


-PPI


-SCDs to bilateral lower extremities while in bed





8/11/2021: Patient very nauseous this morning on a.m. encounter.  Told nurse to 

give additional Zofran to patient.  He can be continued on Protonix and 

sucralfate as suggested by GI.  Discussed patient case with cardiology.  They 

recommended continued amiodarone 200 mg daily and metoprolol for atrial 

fibrillation.  In afternoon, was paged about patient agitation and 

disorientation.  Patient was seen walking in the hallways and stated that he 

wanted to go home.  RN redirected patient back to bed,  restraints were 

requested and ordered.  Patient is on CIWA protocol with as needed Ativan on 

board.  





8/12/2021: patient remains disoriented and appears tremulous. Restraints 

reordered as patient attempts to leave bed multiple times. Continuing with CIWA 

protocol. 





8/13/2021: Patient remains disoriented.  Required doses of Ativan due to 

agitation.  Will need restraints.  Will reassess through the weekend as patient 

continues to have withdrawal





8/14/2021: Patient today alert and oriented x3.  Was initially upset that no one

 had seen him last few days.  However patient was reassured that he was in fact 

seen and he was actively withdrawing from alcohol.  He did not recall the events

 of the last 2 days.  We discussed the need for abstaining from using alcoholic 

substances or any recreational substances for that matter.  This morning he 

denied any nausea vomiting, tremulousness.  We will consult physical therapy 

today as patient will likely be able to participate now.  Restraints are now 

discontinued.





8/15/2021: Still nausaeous at times. However, AOx3. Mentation significantly 

improved. Will follow up on PT evaluation. Patient also had temp of 102F. 

Ordered repeat Bcx, UCx, cxr. 





Disposition: [facility] SERV[facility]





Core Measure Documentation





- Palliative Care


Palliative Care/ Comfort Measures: Not Applicable





Exam





- Constitutional


Vitals: 


                                        











Temp Pulse Resp BP Pulse Ox


 


 98.0 F   18 L  18   138/84   98 


 


 08/17/21 08:03  08/17/21 04:32  08/17/21 08:03  08/17/21 08:03  08/16/21 20:00














Plan


Follow up with: 


PRIMARY CARE,MD [Primary Care Provider] - 3-5 Days


Prescriptions: 


Sucralfate [Carafate] 1 gm PO Q6HR 30 Days #1 bottle


Amiodarone [Cordarone 200 MG TAB] 200 mg PO QDAY 30 Days #30 tablet


Apixaban [Eliquis] 5 mg PO Q48HR 30 Days #15


Folic Acid [Folvite] 1 mg PO QDAY 30 Days #30 tablet


Metoprolol [Lopressor TAB] 50 mg PO BID 30 Days #60 tablet


Pantoprazole [Protonix TAB] 40 mg PO BIDAC 30 Days #60 tablet

## 2021-11-23 ENCOUNTER — HOSPITAL ENCOUNTER (EMERGENCY)
Dept: HOSPITAL 5 - ED | Age: 61
End: 2021-11-23
Payer: MEDICARE

## 2021-11-23 DIAGNOSIS — I46.9: Primary | ICD-10-CM

## 2021-11-23 DIAGNOSIS — M19.90: ICD-10-CM

## 2021-11-23 DIAGNOSIS — I50.9: ICD-10-CM

## 2021-11-23 DIAGNOSIS — J45.909: ICD-10-CM

## 2021-11-23 DIAGNOSIS — I11.0: ICD-10-CM

## 2021-11-23 PROCEDURE — 31500 INSERT EMERGENCY AIRWAY: CPT

## 2021-11-23 PROCEDURE — 92950 HEART/LUNG RESUSCITATION CPR: CPT

## 2021-11-23 PROCEDURE — 99285 EMERGENCY DEPT VISIT HI MDM: CPT

## 2021-11-23 NOTE — EMERGENCY DEPARTMENT REPORT
ED CPR HPI





- General


Chief Complaint: Cardiac Arrest/CPR


Stated Complaint: CARDIA ARREST


Time Seen by Provider: 21 16:57


Source: EMS


Mode of arrival: Stretcher


Limitations: Other





- History of Present Illness


Initial Comments: 





Patient is 61 years old male with history of cardiomyopathy, diabetes, 

hypertension and a recent cardiac arrest.  Patient brought to the emergency room

via EMS from home in a full cardiac arrest, CPR in progress.  EMS is reported 

that patient last time was seen by his family around 2 PM when he went to the 

bathroom.  EMS stated that patient found in the bathroom on the floor, 

unresponsive.  Patient found to be pulseless and in asystole.  ACLS immediately 

started by EMS and patient has a Doc airway in place.  CPR started.  Patient 

received 3 rounds of epinephrine.





Upon arrival to the ER, ACLS protocol continued however patient remained in 

asystole.  Patient pronounced dead at 4:35 PM.


For further information please refer to code sheets.


MD Complaint: found unresponsive


-: unknown


Place: home


Bystander CPR Performed: No


Shock Advised: No


Initial Findings in the Field: no pulse, other rhythm (Asystole)


ROSC in the Field: No


Associated Injuries: No


Treatments Prior to Arrival: other airway device (Doc airway)





- Related Data


                                Home Medications











 Medication  Instructions  Recorded  Confirmed  Last Taken


 


DULoxetine [Cymbalta] 30 mg PO DAILY 20 Unknown


 


Ascorbic Acid [Vitamin C] 1,000 mg PO QDAY 21 Unknown


 


Cholecalciferol (Vitamin D3) 50,000 unit PO QWEEK 21 Unknown





[Vitamin D3 50,000UNIT CAP]    


 


Cyanocobalamin (Vitamin B-12) 500 mcg PO QDAY 21 Unknown





[Vitamin B-12]    


 


Melatonin [Melatonin 10MG TAB] 10 mg PO QDAY 21 Unknown


 


traZODone [Desyrel] 50 mg PO QHS 21 Unknown








                                  Previous Rx's











 Medication  Instructions  Recorded  Last Taken  Type


 


amLODIPine 10 mg PO QDAY #30 tablet 20 Unknown Rx


 


Amiodarone [Cordarone 200 MG TAB] 200 mg PO BID #60 tablet 21 Unknown Rx


 


Apixaban [Eliquis] 2.5 mg PO Q12HR #60 tablet 21 Unknown Rx


 


Digoxin [Lanoxin] 0.125 mg PO DAILY@1700 #30 tablet 21 Unknown Rx


 


Folic Acid [Folvite] 1 mg PO QDAY #30 tablet 21 Unknown Rx


 


Metoprolol [Lopressor TAB] 50 mg PO Q8H #90 tablet 21 Unknown Rx


 


Ondansetron [Zofran Odt] 4 mg PO Q8HR #20 tab.rapdis 21 Unknown Rx


 


Pantoprazole [Protonix] 40 mg PO QDAY #30 tablet 21 Unknown Rx


 


Thiamine [Vitamin B-1] 100 mg PO QDAY #30 tablet 21 Unknown Rx


 


guaiFENesin DM [Guaifenesin Dm 10 ml PO Q4H PRN 14 Days #1 bottle 21 

Unknown Rx





Syrup]    


 


levETIRAcetam [Keppra TAB] 750 mg PO BID #60 tablet 21 Unknown Rx











                                    Allergies











Allergy/AdvReac Type Severity Reaction Status Date / Time


 


No Known Allergies Allergy   Verified 10/25/21 23:47














ED Review of Systems


ROS: 


Stated complaint: CARDIA ARREST


Other details as noted in HPI





Comment: Unobtainable due to pts medical conditions





ED Past Medical Hx





- Past Medical History


Previous Medical History?: Yes


Hx Hypertension: Yes (nonischemic cardiomyopathy, EF 15-20%)


Hx Congestive Heart Failure: Yes


Hx Diabetes: No


Hx Deep Vein Thrombosis:  (unknown)


Hx Renal Disease: No


Hx Arthritis: Yes


Hx Seizures: Yes (last months ago per patient, not on meds)


Hx Asthma: Yes


Hx COPD: No


Additional medical history: VTACH, Defibrillator, Head Trauma, Concussions





- Surgical History


Past Surgical History?: Yes


Hx Pacemaker: No


Hx Internal Defibrillator: Yes


Additional Surgical History: JAW, KNEE, HAND





- Social History


Smoking Status: Never Smoker





- Medications


Home Medications: 


                                Home Medications











 Medication  Instructions  Recorded  Confirmed  Last Taken  Type


 


DULoxetine [Cymbalta] 30 mg PO DAILY 20 Unknown History


 


amLODIPine 10 mg PO QDAY #30 tablet 20 Unknown Rx


 


Ascorbic Acid [Vitamin C] 1,000 mg PO QDAY 21 Unknown History


 


Cholecalciferol (Vitamin D3) 50,000 unit PO QWEEK 21 Unknown 

History





[Vitamin D3 50,000UNIT CAP]     


 


Cyanocobalamin (Vitamin B-12) 500 mcg PO QDAY 21 Unknown History





[Vitamin B-12]     


 


Melatonin [Melatonin 10MG TAB] 10 mg PO QDAY 21 Unknown History


 


traZODone [Desyrel] 50 mg PO QHS 21 Unknown History


 


Amiodarone [Cordarone 200 MG TAB] 200 mg PO BID #60 tablet 21  Unknown Rx


 


Apixaban [Eliquis] 2.5 mg PO Q12HR #60 tablet 21  Unknown Rx


 


Digoxin [Lanoxin] 0.125 mg PO DAILY@1700 #30 tablet 21  Unknown Rx


 


Folic Acid [Folvite] 1 mg PO QDAY #30 tablet 21  Unknown Rx


 


Metoprolol [Lopressor TAB] 50 mg PO Q8H #90 tablet 21  Unknown Rx


 


Ondansetron [Zofran Odt] 4 mg PO Q8HR #20 tab.rapdis 21  Unknown Rx


 


Pantoprazole [Protonix] 40 mg PO QDAY #30 tablet 21  Unknown Rx


 


Thiamine [Vitamin B-1] 100 mg PO QDAY #30 tablet 21  Unknown Rx


 


guaiFENesin DM [Guaifenesin Dm 10 ml PO Q4H PRN 14 Days #1 bottle 21  

Unknown Rx





Syrup]     


 


levETIRAcetam [Keppra TAB] 750 mg PO BID #60 tablet 21  Unknown Rx














ED Physical Exam





- General


Limitations: Other


General appearance: other (CPR in progress.)





- Head


Head exam: Present: atraumatic, normal inspection





- Eye


Pupils: Present: other (4 mm, fixed and dilated.)





- ENT


ENT exam: Present: mucous membranes dry





- Neck


Neck exam: Present: normal inspection





- Respiratory


Respiratory exam: Present: other (No spontaneous breathing.)





- Cardiovascular


Cardiovascular Exam: Present: other (No spontaneous heart tone.)





- GI/Abdominal


GI/Abdominal exam: Present: soft.  Absent: distended





- Back Exam


Back exam: Present: normal inspection





- Neurological Exam


Neurological exam: Present: other (CPR in progress.)





- Skin


Skin exam: Present: warm





ED Medical Decision Making





- Medical Decision Making





Patient is 61 years old male with history of cardiomyopathy, diabetes, 

hypertension and a recent cardiac arrest.  Patient brought to the emergency room

via EMS from home in a full cardiac arrest, CPR in progress.  EMS is reported 

that patient last time was seen by his family around 2 PM when he went to the 

bathroom.  EMS stated that patient found in the bathroom on the floor, 

unresponsive.  Patient found to be pulseless and in asystole.  ACLS immediately 

started by EMS and patient has a Doc airway in place.  CPR started.  Patient 

received 3 rounds of epinephrine.





Upon arrival to the ER, ACLS protocol continued however patient remained in 

asystole.  Patient pronounced dead at 4:35 PM.


For further information please refer to code sheets.








Patient wife arrived to the ER.  She stated that she left the house around 1130 

for an appointment.  He stated that he called her twice asking her to bring 

something for him.  She stated that the last time he talked to her around 1:40 

PM.  She stated that since he left the hospital he was been drinking vodka.  She

stated that he is refusing to eat or drink any other fluids.  Patient wife 

informed about his death.








Critical Care Time: Yes


Critical care time in (mins) excluding proc time.: 35


Critical care attestation.: 


If time is entered above; I have spent that time in minutes in the direct care 

of this critically ill patient, excluding procedure time.








ED Disposition


Clinical Impression: 


 Cardiopulmonary arrest





Disposition: 20 


Is pt being admited?: No


Condition: Stable


Referrals: 


PRIMARY CARE,MD [Primary Care Provider] - 3-5 Days

## 2024-12-03 NOTE — PROGRESS NOTE
Assessment and Plan





Paroxysmal Afib, associated with hypokalemia


   currently in sinus rhythm; on amiodarone and metoprolol for suppression


   on Eliquis for AC


Hx of Nonischemic cardiomyopathy


   4/2021 Echo: LVEF 35-40%


   [facility] 2017: normal coronaries, EF 25-30%.


Presence of [employer]otron[employer] AICD


Acute renal failure


Hx of seizure disorder


Hypertension


Hx of Anemia


Chronic alcoholism





Recommend:


Low sodium diet and fluid restriction.


Continue medical management for paroxysmal atrial fibrillation and nonischemic 

cardiomyopathy.


Otherwise, stable for discharge home.





Subjective


Date of service: 08/17/21


Interval history: 





Patient is resting in bed and has no complaints. He is planned for discharge 

home today.








Objective


                                   Vital Signs











  Temp Pulse Resp BP BP Pulse Ox


 


 08/17/21 08:03  98.0 F   18  138/84  


 


 08/17/21 04:32  98.6 F  18 L  18   126/89 


 


 08/16/21 20:00       98


 


 08/16/21 17:36  97.9 F  87  17   121/74  97


 


 08/16/21 16:00   87    














- Physical Examination


General: No Apparent Distress


HEENT: Positive: PERRL


Neck: Positive: neck supple


Cardiac: Positive: Reg Rate and Rhythm


Lungs: Positive: Decreased Breath Sounds


Neuro: Positive: Weakness


Abdomen: Positive: Soft


Extremities: Absent: edema [Joint Stiffness] : joint stiffness [Negative] : Heme/Lymph